# Patient Record
Sex: FEMALE | Race: WHITE | NOT HISPANIC OR LATINO | Employment: PART TIME | URBAN - METROPOLITAN AREA
[De-identification: names, ages, dates, MRNs, and addresses within clinical notes are randomized per-mention and may not be internally consistent; named-entity substitution may affect disease eponyms.]

---

## 2017-02-09 ENCOUNTER — HOSPITAL ENCOUNTER (OUTPATIENT)
Dept: LAB | Facility: MEDICAL CENTER | Age: 62
End: 2017-02-09
Attending: INTERNAL MEDICINE
Payer: COMMERCIAL

## 2017-02-09 LAB
ALBUMIN SERPL BCP-MCNC: 4.1 G/DL (ref 3.2–4.9)
ALBUMIN/GLOB SERPL: 1.4 G/DL
ALP SERPL-CCNC: 58 U/L (ref 30–99)
ALT SERPL-CCNC: 12 U/L (ref 2–50)
ANION GAP SERPL CALC-SCNC: 6 MMOL/L (ref 0–11.9)
APPEARANCE UR: CLEAR
AST SERPL-CCNC: 14 U/L (ref 12–45)
BASOPHILS # BLD AUTO: 0.03 K/UL (ref 0–0.12)
BASOPHILS NFR BLD AUTO: 0.7 % (ref 0–1.8)
BILIRUB SERPL-MCNC: 0.5 MG/DL (ref 0.1–1.5)
BILIRUB UR QL STRIP.AUTO: NEGATIVE
BUN SERPL-MCNC: 26 MG/DL (ref 8–22)
CALCIUM SERPL-MCNC: 8.9 MG/DL (ref 8.5–10.5)
CHLORIDE SERPL-SCNC: 103 MMOL/L (ref 96–112)
CHOLEST SERPL-MCNC: 188 MG/DL (ref 100–199)
CO2 SERPL-SCNC: 27 MMOL/L (ref 20–33)
COLOR UR AUTO: COLORLESS
CREAT SERPL-MCNC: 0.94 MG/DL (ref 0.5–1.4)
CRP SERPL HS-MCNC: 0.54 MG/DL (ref 0–0.75)
CULTURE IF INDICATED INDCX: NO UA CULTURE
EOSINOPHIL # BLD: 0.06 K/UL (ref 0–0.51)
EOSINOPHIL NFR BLD AUTO: 1.3 % (ref 0–6.9)
ERYTHROCYTE [DISTWIDTH] IN BLOOD BY AUTOMATED COUNT: 47.5 FL (ref 35.9–50)
GLOBULIN SER CALC-MCNC: 2.9 G/DL (ref 1.9–3.5)
GLUCOSE SERPL-MCNC: 58 MG/DL (ref 65–99)
GLUCOSE UR STRIP.AUTO-MCNC: NEGATIVE MG/DL
HCT VFR BLD AUTO: 42.7 % (ref 37–47)
HDLC SERPL-MCNC: 88 MG/DL
HGB BLD-MCNC: 13.6 G/DL (ref 12–16)
IMM GRANULOCYTES # BLD AUTO: 0.01 K/UL (ref 0–0.11)
IMM GRANULOCYTES NFR BLD AUTO: 0.2 % (ref 0–0.9)
KETONES UR STRIP.AUTO-MCNC: NEGATIVE MG/DL
LDLC SERPL CALC-MCNC: 91 MG/DL
LEUKOCYTE ESTERASE UR QL STRIP.AUTO: NEGATIVE
LYMPHOCYTES # BLD: 1.07 K/UL (ref 1–4.8)
LYMPHOCYTES NFR BLD AUTO: 23.5 % (ref 22–41)
MCH RBC QN AUTO: 31.4 PG (ref 27–33)
MCHC RBC AUTO-ENTMCNC: 31.9 G/DL (ref 33.6–35)
MCV RBC AUTO: 98.6 FL (ref 81.4–97.8)
MICRO URNS: NORMAL
MONOCYTES # BLD: 0.37 K/UL (ref 0–0.85)
MONOCYTES NFR BLD AUTO: 8.1 % (ref 0–13.4)
NEUTROPHILS # BLD: 3.02 K/UL (ref 2–7.15)
NEUTROPHILS NFR BLD AUTO: 66.2 % (ref 44–72)
NITRITE UR QL STRIP.AUTO: NEGATIVE
NRBC # BLD AUTO: 0 K/UL
NRBC BLD-RTO: 0 /100 WBC
PH UR: 6 [PH]
PLATELET # BLD AUTO: 213 K/UL (ref 164–446)
PMV BLD AUTO: 10.3 FL (ref 9–12.9)
POTASSIUM SERPL-SCNC: 4.1 MMOL/L (ref 3.6–5.5)
PROT SERPL-MCNC: 7 G/DL (ref 6–8.2)
PROT UR QL STRIP: NEGATIVE MG/DL
RBC # BLD AUTO: 4.33 M/UL (ref 4.2–5.4)
RBC UR QL AUTO: NEGATIVE
SODIUM SERPL-SCNC: 136 MMOL/L (ref 135–145)
SP GR UR STRIP.AUTO: 1.01
TRIGL SERPL-MCNC: 44 MG/DL (ref 0–149)
WBC # BLD AUTO: 4.6 K/UL (ref 4.8–10.8)

## 2017-02-09 PROCEDURE — 36415 COLL VENOUS BLD VENIPUNCTURE: CPT

## 2017-02-09 PROCEDURE — 85025 COMPLETE CBC W/AUTO DIFF WBC: CPT

## 2017-02-09 PROCEDURE — 80061 LIPID PANEL: CPT

## 2017-02-09 PROCEDURE — 80053 COMPREHEN METABOLIC PANEL: CPT

## 2017-02-09 PROCEDURE — 86140 C-REACTIVE PROTEIN: CPT

## 2017-02-09 PROCEDURE — 81003 URINALYSIS AUTO W/O SCOPE: CPT

## 2017-06-22 ENCOUNTER — APPOINTMENT (OUTPATIENT)
Dept: ADMISSIONS | Facility: MEDICAL CENTER | Age: 62
End: 2017-06-22
Payer: COMMERCIAL

## 2017-07-03 DIAGNOSIS — Z01.812 PRE-OPERATIVE LABORATORY EXAMINATION: ICD-10-CM

## 2017-07-03 DIAGNOSIS — Z01.810 PRE-OPERATIVE CARDIOVASCULAR EXAMINATION: ICD-10-CM

## 2017-07-03 LAB
ANION GAP SERPL CALC-SCNC: 6 MMOL/L (ref 0–11.9)
BUN SERPL-MCNC: 25 MG/DL (ref 8–22)
CALCIUM SERPL-MCNC: 9.3 MG/DL (ref 8.5–10.5)
CHLORIDE SERPL-SCNC: 105 MMOL/L (ref 96–112)
CO2 SERPL-SCNC: 28 MMOL/L (ref 20–33)
CREAT SERPL-MCNC: 0.91 MG/DL (ref 0.5–1.4)
EKG IMPRESSION: NORMAL
GFR SERPL CREATININE-BSD FRML MDRD: >60 ML/MIN/1.73 M 2
GLUCOSE SERPL-MCNC: 77 MG/DL (ref 65–99)
POTASSIUM SERPL-SCNC: 4.2 MMOL/L (ref 3.6–5.5)
SODIUM SERPL-SCNC: 139 MMOL/L (ref 135–145)

## 2017-07-03 PROCEDURE — 93010 ELECTROCARDIOGRAM REPORT: CPT | Performed by: INTERNAL MEDICINE

## 2017-07-03 PROCEDURE — 36415 COLL VENOUS BLD VENIPUNCTURE: CPT

## 2017-07-03 PROCEDURE — 80048 BASIC METABOLIC PNL TOTAL CA: CPT

## 2017-07-03 PROCEDURE — 93005 ELECTROCARDIOGRAM TRACING: CPT

## 2017-07-14 ENCOUNTER — HOSPITAL ENCOUNTER (OUTPATIENT)
Facility: MEDICAL CENTER | Age: 62
End: 2017-07-14
Attending: SURGERY | Admitting: SURGERY
Payer: COMMERCIAL

## 2017-07-14 VITALS
BODY MASS INDEX: 31 KG/M2 | HEART RATE: 75 BPM | DIASTOLIC BLOOD PRESSURE: 70 MMHG | TEMPERATURE: 97.7 F | SYSTOLIC BLOOD PRESSURE: 113 MMHG | WEIGHT: 168.43 LBS | HEIGHT: 62 IN | RESPIRATION RATE: 14 BRPM | OXYGEN SATURATION: 100 %

## 2017-07-14 PROBLEM — K64.8 INTERNAL HEMORRHOIDS WITHOUT MENTION OF COMPLICATION: Status: ACTIVE | Noted: 2017-07-14

## 2017-07-14 PROCEDURE — A9270 NON-COVERED ITEM OR SERVICE: HCPCS

## 2017-07-14 PROCEDURE — 501838 HCHG SUTURE GENERAL: Performed by: SURGERY

## 2017-07-14 PROCEDURE — 160035 HCHG PACU - 1ST 60 MINS PHASE I: Performed by: SURGERY

## 2017-07-14 PROCEDURE — 160036 HCHG PACU - EA ADDL 30 MINS PHASE I: Performed by: SURGERY

## 2017-07-14 PROCEDURE — 700101 HCHG RX REV CODE 250

## 2017-07-14 PROCEDURE — 160046 HCHG PACU - 1ST 60 MINS PHASE II: Performed by: SURGERY

## 2017-07-14 PROCEDURE — 160002 HCHG RECOVERY MINUTES (STAT): Performed by: SURGERY

## 2017-07-14 PROCEDURE — 160025 RECOVERY II MINUTES (STATS): Performed by: SURGERY

## 2017-07-14 PROCEDURE — 700111 HCHG RX REV CODE 636 W/ 250 OVERRIDE (IP)

## 2017-07-14 PROCEDURE — 160047 HCHG PACU  - EA ADDL 30 MINS PHASE II: Performed by: SURGERY

## 2017-07-14 PROCEDURE — 160048 HCHG OR STATISTICAL LEVEL 1-5: Performed by: SURGERY

## 2017-07-14 PROCEDURE — 160009 HCHG ANES TIME/MIN: Performed by: SURGERY

## 2017-07-14 PROCEDURE — 500445 HCHG HEMOSTAT, SURGICEL 4X8: Performed by: SURGERY

## 2017-07-14 PROCEDURE — 160039 HCHG SURGERY MINUTES - EA ADDL 1 MIN LEVEL 3: Performed by: SURGERY

## 2017-07-14 PROCEDURE — 502240 HCHG MISC OR SUPPLY RC 0272: Performed by: SURGERY

## 2017-07-14 PROCEDURE — 700102 HCHG RX REV CODE 250 W/ 637 OVERRIDE(OP)

## 2017-07-14 PROCEDURE — 160028 HCHG SURGERY MINUTES - 1ST 30 MINS LEVEL 3: Performed by: SURGERY

## 2017-07-14 RX ORDER — POLYETHYLENE GLYCOL 3350 17 G/17G
17 POWDER, FOR SOLUTION ORAL PRN
Qty: 1 BOTTLE | Status: SHIPPED | OUTPATIENT
Start: 2017-07-14 | End: 2018-07-26

## 2017-07-14 RX ORDER — CELECOXIB 200 MG/1
CAPSULE ORAL
Status: COMPLETED
Start: 2017-07-14 | End: 2017-07-14

## 2017-07-14 RX ORDER — SODIUM CHLORIDE, SODIUM LACTATE, POTASSIUM CHLORIDE, CALCIUM CHLORIDE 600; 310; 30; 20 MG/100ML; MG/100ML; MG/100ML; MG/100ML
INJECTION, SOLUTION INTRAVENOUS CONTINUOUS
Status: DISCONTINUED | OUTPATIENT
Start: 2017-07-14 | End: 2017-07-14 | Stop reason: HOSPADM

## 2017-07-14 RX ORDER — ONDANSETRON 4 MG/1
4 TABLET, FILM COATED ORAL EVERY 4 HOURS PRN
Qty: 20 TAB | Refills: 1 | Status: SHIPPED | OUTPATIENT
Start: 2017-07-14 | End: 2018-07-26

## 2017-07-14 RX ORDER — ACETAMINOPHEN 500 MG
TABLET ORAL
Status: COMPLETED
Start: 2017-07-14 | End: 2017-07-14

## 2017-07-14 RX ORDER — OXYCODONE HYDROCHLORIDE 5 MG/1
TABLET ORAL
Status: COMPLETED
Start: 2017-07-14 | End: 2017-07-14

## 2017-07-14 RX ORDER — OXYCODONE HYDROCHLORIDE 5 MG/1
5 TABLET ORAL ONCE
Status: DISCONTINUED | OUTPATIENT
Start: 2017-07-14 | End: 2017-07-14 | Stop reason: HOSPADM

## 2017-07-14 RX ORDER — LIDOCAINE HYDROCHLORIDE 10 MG/ML
INJECTION, SOLUTION INFILTRATION; PERINEURAL
Status: COMPLETED
Start: 2017-07-14 | End: 2017-07-14

## 2017-07-14 RX ORDER — LIDOCAINE AND PRILOCAINE 25; 25 MG/G; MG/G
1 CREAM TOPICAL
Status: DISCONTINUED | OUTPATIENT
Start: 2017-07-14 | End: 2017-07-14 | Stop reason: HOSPADM

## 2017-07-14 RX ORDER — LIDOCAINE HYDROCHLORIDE 10 MG/ML
0.5 INJECTION, SOLUTION INFILTRATION; PERINEURAL
Status: DISCONTINUED | OUTPATIENT
Start: 2017-07-14 | End: 2017-07-14 | Stop reason: HOSPADM

## 2017-07-14 RX ORDER — BUPIVACAINE HYDROCHLORIDE AND EPINEPHRINE 5; 5 MG/ML; UG/ML
INJECTION, SOLUTION EPIDURAL; INTRACAUDAL; PERINEURAL
Status: DISCONTINUED | OUTPATIENT
Start: 2017-07-14 | End: 2017-07-14 | Stop reason: HOSPADM

## 2017-07-14 RX ORDER — OXYCODONE HYDROCHLORIDE AND ACETAMINOPHEN 5; 325 MG/1; MG/1
1-2 TABLET ORAL EVERY 4 HOURS PRN
Qty: 60 TAB | Refills: 0 | Status: SHIPPED | OUTPATIENT
Start: 2017-07-14 | End: 2018-07-26

## 2017-07-14 RX ADMIN — OXYCODONE HYDROCHLORIDE 5 MG: 5 TABLET ORAL at 09:30

## 2017-07-14 RX ADMIN — LIDOCAINE HYDROCHLORIDE: 10 INJECTION, SOLUTION INFILTRATION; PERINEURAL at 06:30

## 2017-07-14 RX ADMIN — SODIUM CHLORIDE, SODIUM LACTATE, POTASSIUM CHLORIDE, CALCIUM CHLORIDE: 600; 310; 30; 20 INJECTION, SOLUTION INTRAVENOUS at 07:15

## 2017-07-14 RX ADMIN — ACETAMINOPHEN 1000 MG: 500 TABLET, FILM COATED ORAL at 07:37

## 2017-07-14 ASSESSMENT — PAIN SCALES - GENERAL
PAINLEVEL_OUTOF10: 4
PAINLEVEL_OUTOF10: 0
PAINLEVEL_OUTOF10: 0
PAINLEVEL_OUTOF10: 4
PAINLEVEL_OUTOF10: 0
PAINLEVEL_OUTOF10: 4
PAINLEVEL_OUTOF10: 4
PAINLEVEL_OUTOF10: 0
PAINLEVEL_OUTOF10: 0

## 2017-07-14 NOTE — IP AVS SNAPSHOT
" Home Care Instructions                                                                                                                Name:Zakiya Kelly  Medical Record Number:[id number]  CSN: [id number]    YOB: 1955   Age: 61 y.o.  Sex: female  HT:1.575 m (5' 2\") WT: 76.4 kg (168 lb 6.9 oz)          Admit Date: 7/14/2017     Discharge Date:   Today's Date: 7/14/2017  Attending Doctor:  Eleazar Mckeon M.D.                  Allergies:  Aspirin; Codeine; Sulfa drugs; Valium; Versed; and Vicodin              Follow-up Information     1. Follow up with Eleazar Mckeon M.D. In 1 week.    Specialties:  Surgery, Radiology    Contact information    [id number]  [address]  [place] [address] [phone]          Discharge Instructions         ACTIVITY: Rest and take it easy for the first 24 hours.  A responsible adult is recommended to remain with you during that time.  It is normal to feel sleepy.  We encourage you to not do anything that requires balance, judgment or coordination.    MILD FLU-LIKE SYMPTOMS ARE NORMAL. YOU MAY EXPERIENCE GENERALIZED MUSCLE ACHES, THROAT IRRITATION, HEADACHE AND/OR SOME NAUSEA.    FOR 24 HOURS DO NOT:  Drive, operate machinery or run household appliances.  Drink beer or alcoholic beverages.   Make important decisions or sign legal documents.    SPECIAL INSTRUCTIONS: FOLLOW INSTRUCTION GIVEN TO YOU BY YOUR PHYSICIAN. SCHEDULE A FOLLOW UP APPOINTMENT IF NOT ALREADY DONE. ADVANCE DIET SLOWLY AS TOLERATED     DIET: To avoid nausea, slowly advance diet as tolerated, avoiding spicy or greasy foods for the first day.  Add more substantial food to your diet according to your physician's instructions.  Babies can be fed formula or breast milk as soon as they are hungry.  INCREASE FLUIDS AND FIBER TO AVOID CONSTIPATION.    SURGICAL DRESSING/BATHING: YOU MAY SHOWER TOMORROW WHEN YOU ARE FEELING MORE STABLE.     FOLLOW-UP APPOINTMENT:  A follow-up appointment should be arranged " with your doctor; call to schedule.    You should CALL YOUR PHYSICIAN if you develop:  Fever greater than 101 degrees F.  Pain not relieved by medication, or persistent nausea or vomiting.  Excessive bleeding (blood soaking through dressing) or unexpected drainage from the wound.  Extreme redness or swelling around the incision site, drainage of pus or foul smelling drainage.  Inability to urinate or empty your bladder within 8 hours.  Problems with breathing or chest pain.    You should call 911 if you develop problems with breathing or chest pain.  If you are unable to contact your doctor or surgical center, you should go to the nearest emergency room or urgent care center.  Physician's telephone #: [phone]    If any questions arise, call your doctor.  If your doctor is not available, please feel free to call the Surgical Center at [phone].  The Center is open Monday through Friday from 7AM to 7PM.  You can also call the [employer] HOTLINE open 24 hours/day, 7 days/week and speak to a nurse at [phone], or toll free at [phone].    A registered nurse may call you a few days after your surgery to see how you are doing after your procedure.    MEDICATIONS: Resume taking daily medication.  Take prescribed pain medication with food.  If no medication is prescribed, you may take non-aspirin pain medication if needed.  PAIN MEDICATION CAN BE VERY CONSTIPATING.  Take a stool softener or laxative such as senokot, pericolace, or milk of magnesia if needed.    Prescription given for Zofran, percocet, miralax.  Last pain medication given at 9:30am.    If your physician has prescribed pain medication that includes Acetaminophen (Tylenol), do not take additional Acetaminophen (Tylenol) while taking the prescribed medication.    Depression / Suicide Risk    As you are discharged from this [facility] facility, it is important to learn how to keep safe from harming yourself.    Recognize the warning  signs:  · Abrupt changes in personality, positive or negative- including increase in energy   · Giving away possessions  · Change in eating patterns- significant weight changes-  positive or negative  · Change in sleeping patterns- unable to sleep or sleeping all the time   · Unwillingness or inability to communicate  · Depression  · Unusual sadness, discouragement and loneliness  · Talk of wanting to die  · Neglect of personal appearance   · Rebelliousness- reckless behavior  · Withdrawal from people/activities they love  · Confusion- inability to concentrate     If you or a loved one observes any of these behaviors or has concerns about self-harm, here's what you can do:  · Talk about it- your feelings and reasons for harming yourself  · Remove any means that you might use to hurt yourself (examples: pills, rope, extension cords, firearm)  · Get professional help from the community (Mental Health, Substance Abuse, psychological counseling)  · Do not be alone:Call your Safe Contact- someone whom you trust who will be there for you.  · Call your local CRISIS HOTLINE 395-7626 or 090-459-9786  · Call your local Children's Mobile Crisis Response Team [place] (001) 921-7956 or www.[employer]  · Call the toll free National Suicide Prevention Hotlines   · National Suicide Prevention Lifeline 222-584-DNUH (8906)  · National Hope Line Network 800-SUICIDE (506-4292)       Medication List      START taking these medications        Instructions    Morning Afternoon Evening Bedtime    ondansetron 4 MG Tabs tablet   Commonly known as:  ZOFRAN        Take 1 Tab by mouth every four hours as needed for Nausea/Vomiting (nausea).   Dose:  4 mg                        oxycodone-acetaminophen 5-325 MG Tabs   Commonly known as:  PERCOCET        Take 1-2 Tabs by mouth every four hours as needed (pain).   Dose:  1-2 Tab                        polyethylene glycol 3350 Powd   Commonly known as:  MIRALAX        Take 17 g by mouth  as needed (constipation).   Dose:  17 g                          CONTINUE taking these medications        Instructions    Morning Afternoon Evening Bedtime    VLADISLAV-C PO        Take 1,000 mg by mouth every day.   Dose:  1000 mg                        lisinopril 10 MG Tabs   Commonly known as:  PRINIVIL        Take 10 mg by mouth every day.   Dose:  10 mg                        PLAQUENIL 200 MG Tabs   Generic drug:  hydroxychloroquine        Take 400 mg by mouth every day.   Dose:  400 mg                        TYLENOL EXTRA STRENGTH PO   Last time this was given:  1,000 mg on 7/14/2017  7:37 AM        Take  by mouth as needed.                        VITAMIN B-12 PO        Take  by mouth every day.                        Vitamin D 2000 UNITS Caps        Take  by mouth every day.                        ZYRTEC 10 MG Tabs   Generic drug:  cetirizine        Take 10 mg by mouth every day.   Dose:  10 mg                             Where to Get Your Medications      You can get these medications from any pharmacy     Bring a paper prescription for each of these medications    - ondansetron 4 MG Tabs tablet  - oxycodone-acetaminophen 5-325 MG Tabs  - polyethylene glycol 3350 Powd            Medication Information     Above and/or attached are the medications your physician expects you to take upon discharge. Review all of your home medications and newly ordered medications with your doctor and/or pharmacist. Follow medication instructions as directed by your doctor and/or pharmacist. Please keep your medication list with you and share with your physician. Update the information when medications are discontinued, doses are changed, or new medications (including over-the-counter products) are added; and carry medication information at all times in the event of emergency situations.        Resources     Quit Smoking / Tobacco Use:    I understand the use of any tobacco products increases my chance of suffering from future  heart disease or stroke and could cause other illnesses which may shorten my life. Quitting the use of tobacco products is the single most important thing I can do to improve my health. For further information on smoking / tobacco cessation call a Toll Free Quit Line at 1-684.522.6684 (*[employer]) or 1-719.236.1950 (American Lung Association) or you can access the web based program at www.lungusa.org.    [place] Tobacco Users Help Line:  (818) 268-6953       Toll Free: 1-912.674.4669  Quit Tobacco Program [facility] (239)700-9361    Crisis Hotline:    [place] Crisis Hotline:  5-397-DQBZGBB or 1-885.879.3455    [place] Crisis Hotline:    1-228.632.4288 or 391-801-0347    Discharge Survey:   Thank you for choosing [facility]. We hope we did everything we could to make your hospital stay a pleasant one. You may be receiving a survey and we would appreciate your time and participation in answering the questions. Your input is very valuable to us in our efforts to improve our service to our patients and their families.            Signatures     My signature on this form indicates that:    1. I acknowledge receipt and understanding of these Home Care Instruction.  2. My questions regarding this information have been answered to my satisfaction.  3. I have formulated a plan with my discharge nurse to obtain my prescribed medications for home.    __________________________________      __________________________________                   Patient Signature                                 Guardian/Responsible Adult Signature      __________________________________                 __________       ________                       Nurse Signature                                               Date                 Time

## 2017-07-14 NOTE — IP AVS SNAPSHOT
7/14/2017    Zakiya Kelly  [address] [address]    Dear Zakiya:    [facility] wants to ensure your discharge home is safe and you or your loved ones have had all of your questions answered regarding your care after you leave the hospital.    Below is a list of resources and contact information should you have any questions regarding your hospital stay, follow-up instructions, or active medical symptoms.    Questions or Concerns Regarding… Contact   Medical Questions Related to Your Discharge  (7 days a week, 8am-5pm) Contact a Nurse Care Coordinator   [phone]   Medical Questions Not Related to Your Discharge  (24 hours a day / 7 days a week)  Contact the Nurse Health Line   [phone]    Medications or Discharge Instructions Refer to your discharge packet   or contact your [facility] Primary Care Provider   [phone]   Follow-up Appointment(s) Schedule your appointment via [employer]   or contact Scheduling [phone]   Billing Review your statement via [employer]  or contact Billing [phone]   Medical Records Review your records via LATTO   or contact Medical Records [phone]     You may receive a telephone call within two days of discharge. This call is to make certain you understand your discharge instructions and have the opportunity to have any questions answered. You can also easily access your medical information, test results and upcoming appointments via the [employer] free online health management tool. You can learn more and sign up at [employer]LATTO. For assistance setting up your LATTO account, please call [phone].    Once again, we want to ensure your discharge home is safe and that you have a clear understanding of any next steps in your care. If you have any questions or concerns, please do not hesitate to contact us, we are here for you. Thank you for choosing [facility] for your healthcare needs.    Sincerely,    Your [facility] Healthcare Team

## 2017-07-14 NOTE — OP REPORT
Preoperative diagnosis: Symptomatic hemorrhoids    Postoperative diagnosis: Same    Procedure: Hemorrhoid artery ligation and rectoanal repair    Surgeon: Dr. Eleazar Mckeon    Assistant: None    Anesthesia: LMA by Dr. Lyons    Specimens: None    Estimated blood loss: 10 mL    Complications: None     Condition: Stable    Indications for procedure: This is a 61-year-old female who presents with symptomatic hemorrhoids which are refractory to medical management. Patient now presents for elective surgery, and has elected to have a hemorrhoid ligation procedure. Risk benefits alternatives of surgery made her before proceeding    Operative findings: Enlarged hemorrhoids in 3 columns with 6 ligations and 3 pexy's performed with hemostasis.    Operative techniques: After informed consent was obtained the patient was taken to the operating room placed in the supine position, after adequate endotracheal anesthesia was achieved she was in lithotomy position the anus and perineum were prepped and draped in sterile fashion. We then performed a digital rectal Hill-Fajardo retractor examined with the findings as above. We then inserted the AMI device and identify the hemorrhoidal vessels in 6 locations. These were ligated with 0 Vicryl U stitches with hemostasis.    We then performed 3 hemorrhoidal pexy's on the major hemorrhoid columns with running 0 Vicryl sutures starting at the apex of the column and running down to the dentate line. Hemostasis was noted the operative field 30 mL of half percent Marcaine was given for local anesthetic block. The procedure was concluded, Surgicel was placed in the anal canal and the patient was returned to the PACU in stable condition. All counts were correct at the end of the procedure.

## 2017-07-14 NOTE — DISCHARGE INSTRUCTIONS
ACTIVITY: Rest and take it easy for the first 24 hours.  A responsible adult is recommended to remain with you during that time.  It is normal to feel sleepy.  We encourage you to not do anything that requires balance, judgment or coordination.    MILD FLU-LIKE SYMPTOMS ARE NORMAL. YOU MAY EXPERIENCE GENERALIZED MUSCLE ACHES, THROAT IRRITATION, HEADACHE AND/OR SOME NAUSEA.    FOR 24 HOURS DO NOT:  Drive, operate machinery or run household appliances.  Drink beer or alcoholic beverages.   Make important decisions or sign legal documents.    SPECIAL INSTRUCTIONS: FOLLOW INSTRUCTION GIVEN TO YOU BY YOUR PHYSICIAN. SCHEDULE A FOLLOW UP APPOINTMENT IF NOT ALREADY DONE. ADVANCE DIET SLOWLY AS TOLERATED     DIET: To avoid nausea, slowly advance diet as tolerated, avoiding spicy or greasy foods for the first day.  Add more substantial food to your diet according to your physician's instructions.  Babies can be fed formula or breast milk as soon as they are hungry.  INCREASE FLUIDS AND FIBER TO AVOID CONSTIPATION.    SURGICAL DRESSING/BATHING: YOU MAY SHOWER TOMORROW WHEN YOU ARE FEELING MORE STABLE.     FOLLOW-UP APPOINTMENT:  A follow-up appointment should be arranged with your doctor; call to schedule.    You should CALL YOUR PHYSICIAN if you develop:  Fever greater than 101 degrees F.  Pain not relieved by medication, or persistent nausea or vomiting.  Excessive bleeding (blood soaking through dressing) or unexpected drainage from the wound.  Extreme redness or swelling around the incision site, drainage of pus or foul smelling drainage.  Inability to urinate or empty your bladder within 8 hours.  Problems with breathing or chest pain.    You should call 911 if you develop problems with breathing or chest pain.  If you are unable to contact your doctor or surgical center, you should go to the nearest emergency room or urgent care center.  Physician's telephone #: [phone]    If any questions arise, call your  doctor.  If your doctor is not available, please feel free to call the Surgical Center at [phone].  The Center is open Monday through Friday from 7AM to 7PM.  You can also call the HEALTH HOTLINE open 24 hours/day, 7 days/week and speak to a nurse at [phone], or toll free at [phone].    A registered nurse may call you a few days after your surgery to see how you are doing after your procedure.    MEDICATIONS: Resume taking daily medication.  Take prescribed pain medication with food.  If no medication is prescribed, you may take non-aspirin pain medication if needed.  PAIN MEDICATION CAN BE VERY CONSTIPATING.  Take a stool softener or laxative such as senokot, pericolace, or milk of magnesia if needed.    Prescription given for Zofran, percocet, miralax.  Last pain medication given at 9:30am.    If your physician has prescribed pain medication that includes Acetaminophen (Tylenol), do not take additional Acetaminophen (Tylenol) while taking the prescribed medication.    Depression / Suicide Risk    As you are discharged from this [facility] facility, it is important to learn how to keep safe from harming yourself.    Recognize the warning signs:  · Abrupt changes in personality, positive or negative- including increase in energy   · Giving away possessions  · Change in eating patterns- significant weight changes-  positive or negative  · Change in sleeping patterns- unable to sleep or sleeping all the time   · Unwillingness or inability to communicate  · Depression  · Unusual sadness, discouragement and loneliness  · Talk of wanting to die  · Neglect of personal appearance   · Rebelliousness- reckless behavior  · Withdrawal from people/activities they love  · Confusion- inability to concentrate     If you or a loved one observes any of these behaviors or has concerns about self-harm, here's what you can do:  · Talk about it- your feelings and reasons for harming yourself  · Remove any means  that you might use to hurt yourself (examples: pills, rope, extension cords, firearm)  · Get professional help from the community (Mental Health, Substance Abuse, psychological counseling)  · Do not be alone:Call your Safe Contact- someone whom you trust who will be there for you.  · Call your local CRISIS HOTLINE 723-8250 or 279-442-5842  · Call your local Children's Mobile Crisis Response Team [place] (772) 177-7794 or www.[employer]  · Call the toll free National Suicide Prevention Hotlines   · National Suicide Prevention Lifeline 186-533-AHGW (2777)  · National Hope Line Network 800-SUICIDE (710-5844)

## 2017-07-17 NOTE — PROGRESS NOTES
Med rec complete per Pt at bedside  Allergies reviewed  No ABX in last 30 days  
No masses; no nipple discharge

## 2017-08-08 ENCOUNTER — HOSPITAL ENCOUNTER (OUTPATIENT)
Dept: LAB | Facility: MEDICAL CENTER | Age: 62
End: 2017-08-08
Attending: INTERNAL MEDICINE
Payer: COMMERCIAL

## 2017-08-08 LAB
ALBUMIN SERPL BCP-MCNC: 4.4 G/DL (ref 3.2–4.9)
ALBUMIN/GLOB SERPL: 1.7 G/DL
ALP SERPL-CCNC: 63 U/L (ref 30–99)
ALT SERPL-CCNC: 12 U/L (ref 2–50)
ANION GAP SERPL CALC-SCNC: 8 MMOL/L (ref 0–11.9)
APPEARANCE UR: CLEAR
AST SERPL-CCNC: 15 U/L (ref 12–45)
BACTERIA #/AREA URNS HPF: NEGATIVE /HPF
BASOPHILS # BLD AUTO: 0.4 % (ref 0–1.8)
BASOPHILS # BLD: 0.02 K/UL (ref 0–0.12)
BILIRUB SERPL-MCNC: 0.6 MG/DL (ref 0.1–1.5)
BILIRUB UR QL STRIP.AUTO: NEGATIVE
BUN SERPL-MCNC: 19 MG/DL (ref 8–22)
C3 SERPL-MCNC: 132 MG/DL (ref 87–200)
C4 SERPL-MCNC: 44 MG/DL (ref 19–52)
CALCIUM SERPL-MCNC: 9.8 MG/DL (ref 8.5–10.5)
CHLORIDE SERPL-SCNC: 103 MMOL/L (ref 96–112)
CO2 SERPL-SCNC: 27 MMOL/L (ref 20–33)
COLOR UR: YELLOW
CREAT SERPL-MCNC: 0.9 MG/DL (ref 0.5–1.4)
CRP SERPL HS-MCNC: 0.7 MG/DL (ref 0–0.75)
CULTURE IF INDICATED INDCX: YES UA CULTURE
EOSINOPHIL # BLD AUTO: 0.09 K/UL (ref 0–0.51)
EOSINOPHIL NFR BLD: 1.9 % (ref 0–6.9)
EPI CELLS #/AREA URNS HPF: ABNORMAL /HPF
ERYTHROCYTE [DISTWIDTH] IN BLOOD BY AUTOMATED COUNT: 43.5 FL (ref 35.9–50)
GFR SERPL CREATININE-BSD FRML MDRD: >60 ML/MIN/1.73 M 2
GLOBULIN SER CALC-MCNC: 2.6 G/DL (ref 1.9–3.5)
GLUCOSE SERPL-MCNC: 56 MG/DL (ref 65–99)
GLUCOSE UR STRIP.AUTO-MCNC: NEGATIVE MG/DL
HCT VFR BLD AUTO: 43.4 % (ref 37–47)
HGB BLD-MCNC: 14.5 G/DL (ref 12–16)
HYALINE CASTS #/AREA URNS LPF: ABNORMAL /LPF
IMM GRANULOCYTES # BLD AUTO: 0.01 K/UL (ref 0–0.11)
IMM GRANULOCYTES NFR BLD AUTO: 0.2 % (ref 0–0.9)
KETONES UR STRIP.AUTO-MCNC: NEGATIVE MG/DL
LEUKOCYTE ESTERASE UR QL STRIP.AUTO: ABNORMAL
LYMPHOCYTES # BLD AUTO: 0.98 K/UL (ref 1–4.8)
LYMPHOCYTES NFR BLD: 20.9 % (ref 22–41)
MCH RBC QN AUTO: 31.9 PG (ref 27–33)
MCHC RBC AUTO-ENTMCNC: 33.4 G/DL (ref 33.6–35)
MCV RBC AUTO: 95.4 FL (ref 81.4–97.8)
MICRO URNS: ABNORMAL
MONOCYTES # BLD AUTO: 0.27 K/UL (ref 0–0.85)
MONOCYTES NFR BLD AUTO: 5.8 % (ref 0–13.4)
NEUTROPHILS # BLD AUTO: 3.31 K/UL (ref 2–7.15)
NEUTROPHILS NFR BLD: 70.8 % (ref 44–72)
NITRITE UR QL STRIP.AUTO: NEGATIVE
NRBC # BLD AUTO: 0 K/UL
NRBC BLD AUTO-RTO: 0 /100 WBC
PH UR STRIP.AUTO: 6.5 [PH]
PLATELET # BLD AUTO: 252 K/UL (ref 164–446)
PMV BLD AUTO: 10.3 FL (ref 9–12.9)
POTASSIUM SERPL-SCNC: 3.9 MMOL/L (ref 3.6–5.5)
PROT SERPL-MCNC: 7 G/DL (ref 6–8.2)
PROT UR QL STRIP: NEGATIVE MG/DL
RBC # BLD AUTO: 4.55 M/UL (ref 4.2–5.4)
RBC UR QL AUTO: NEGATIVE
SODIUM SERPL-SCNC: 138 MMOL/L (ref 135–145)
SP GR UR STRIP.AUTO: 1.01
UROBILINOGEN UR STRIP.AUTO-MCNC: 0.2 MG/DL
WBC # BLD AUTO: 4.7 K/UL (ref 4.8–10.8)
WBC #/AREA URNS HPF: ABNORMAL /HPF

## 2017-08-08 PROCEDURE — 86140 C-REACTIVE PROTEIN: CPT

## 2017-08-08 PROCEDURE — 81001 URINALYSIS AUTO W/SCOPE: CPT

## 2017-08-08 PROCEDURE — 85025 COMPLETE CBC W/AUTO DIFF WBC: CPT

## 2017-08-08 PROCEDURE — 36415 COLL VENOUS BLD VENIPUNCTURE: CPT

## 2017-08-08 PROCEDURE — 86160 COMPLEMENT ANTIGEN: CPT | Mod: 91

## 2017-08-08 PROCEDURE — 80053 COMPREHEN METABOLIC PANEL: CPT

## 2017-08-08 PROCEDURE — 87086 URINE CULTURE/COLONY COUNT: CPT

## 2017-08-10 LAB
BACTERIA UR CULT: NORMAL
SIGNIFICANT IND 70042: NORMAL
SOURCE SOURCE: NORMAL

## 2017-08-16 ENCOUNTER — RX ONLY (OUTPATIENT)
Age: 62
Setting detail: RX ONLY
End: 2017-08-16

## 2017-11-03 ENCOUNTER — HOSPITAL ENCOUNTER (OUTPATIENT)
Dept: RADIOLOGY | Facility: MEDICAL CENTER | Age: 62
End: 2017-11-03
Attending: FAMILY MEDICINE
Payer: COMMERCIAL

## 2017-11-03 DIAGNOSIS — Z12.31 SCREENING MAMMOGRAM, ENCOUNTER FOR: ICD-10-CM

## 2017-11-03 PROCEDURE — G0202 SCR MAMMO BI INCL CAD: HCPCS

## 2017-11-09 ENCOUNTER — HOSPITAL ENCOUNTER (OUTPATIENT)
Dept: RADIOLOGY | Facility: MEDICAL CENTER | Age: 62
End: 2017-11-09
Attending: FAMILY MEDICINE
Payer: COMMERCIAL

## 2017-11-09 DIAGNOSIS — R10.2 ADNEXAL TENDERNESS, RIGHT: ICD-10-CM

## 2017-11-09 PROCEDURE — 76830 TRANSVAGINAL US NON-OB: CPT

## 2017-11-20 ENCOUNTER — OFFICE VISIT (OUTPATIENT)
Dept: URGENT CARE | Facility: PHYSICIAN GROUP | Age: 62
End: 2017-11-20
Payer: COMMERCIAL

## 2017-11-20 VITALS
RESPIRATION RATE: 16 BRPM | WEIGHT: 166 LBS | HEART RATE: 80 BPM | HEIGHT: 63 IN | OXYGEN SATURATION: 100 % | BODY MASS INDEX: 29.41 KG/M2 | SYSTOLIC BLOOD PRESSURE: 122 MMHG | DIASTOLIC BLOOD PRESSURE: 78 MMHG | TEMPERATURE: 98.7 F

## 2017-11-20 DIAGNOSIS — R42 VERTIGO: ICD-10-CM

## 2017-11-20 PROCEDURE — 99203 OFFICE O/P NEW LOW 30 MIN: CPT | Performed by: EMERGENCY MEDICINE

## 2017-11-20 RX ORDER — MECLIZINE HYDROCHLORIDE 25 MG/1
25 TABLET ORAL 3 TIMES DAILY PRN
Qty: 30 TAB | Refills: 0 | Status: SHIPPED | OUTPATIENT
Start: 2017-11-20 | End: 2018-07-26

## 2017-11-20 NOTE — LETTER
November 20, 2017        Zakiya [place]  [address] [address]        Dear Zakiya:    Please ask for the next 1-2 days off for medical reasons.    If you have any questions or concerns, please don't hesitate to call.        Sincerely,        Braxton Harrison M.D.    Electronically Signed

## 2017-11-21 ENCOUNTER — TELEPHONE (OUTPATIENT)
Dept: URGENT CARE | Facility: PHYSICIAN GROUP | Age: 62
End: 2017-11-21

## 2017-11-21 ASSESSMENT — ENCOUNTER SYMPTOMS
SHORTNESS OF BREATH: 0
EYE DISCHARGE: 0
HEADACHES: 0
DIZZINESS: 1
COUGH: 0
EYE REDNESS: 0
FEVER: 0
NERVOUS/ANXIOUS: 0
NAUSEA: 0
CHILLS: 0
BACK PAIN: 0
VOMITING: 0
NECK PAIN: 0

## 2017-11-21 NOTE — TELEPHONE ENCOUNTER
I'm going to putting the nose and throat consult for the patient based on her vertiginous symptoms.

## 2017-11-21 NOTE — PROGRESS NOTES
"Subjective:      Zakiya Kelly is a 62 y.o. female who presents with Otalgia (L ear pain x2 days, has vertigo )            HPI  Patient is a 62-year-old female complaining with associated vertigo for the past 2 days. Patient's vertigo is positional in nature, she denies any fever, chills, nausea or vomiting. She has read up on the subject and is aware of the possibilities of benign positional vertigo as well as existence of otolith.    Allergies   Allergen Reactions   • Aspirin      Stomach ulcers   • Codeine Nausea     Does ok with zofran with it   • Sulfa Drugs Hives   • Valium Nausea   • Versed Vomiting   • Vicodin [Hydrocodone-Acetaminophen]      \"makes me crazy\"     Social History     Social History   • Marital status:      Spouse name: N/A   • Number of children: N/A   • Years of education: N/A     Occupational History   • Not on file.     Social History Main Topics   • Smoking status: Never Smoker   • Smokeless tobacco: Never Used   • Alcohol use Yes      Comment: 6 per week   • Drug use: No   • Sexual activity: Yes     Partners: Male     Other Topics Concern   • Not on file     Social History Narrative   • No narrative on file     Past Medical History:   Diagnosis Date   • Anesthesia     postop n/v   • Bowel habit changes     IBS   • CATARACT     bilateral IOL   • HTN (hypertension)    • Mixed connective tissue disease (CMS-HCC)    • Seasonal allergies    • Snoring      Current Outpatient Prescriptions on File Prior to Visit   Medication Sig Dispense Refill   • oxycodone-acetaminophen (PERCOCET) 5-325 MG Tab Take 1-2 Tabs by mouth every four hours as needed (pain). 60 Tab 0   • polyethylene glycol 3350 (MIRALAX) Powder Take 17 g by mouth as needed (constipation). 1 Bottle prn   • ondansetron (ZOFRAN) 4 MG Tab tablet Take 1 Tab by mouth every four hours as needed for Nausea/Vomiting (nausea). 20 Tab 1   • Cyanocobalamin (VITAMIN B-12 PO) Take  by mouth every day.     • cetirizine (ZYRTEC) 10 MG " "TABS Take 10 mg by mouth every day.     • hydroxychloroquine (PLAQUENIL) 200 MG TABS Take 400 mg by mouth every day.     • Cholecalciferol (VITAMIN D) 2000 UNIT CAPS Take  by mouth every day.     • Vitamin Mixture (VLADISLAV-C PO) Take 1,000 mg by mouth every day.     • Acetaminophen (TYLENOL EXTRA STRENGTH PO) Take  by mouth as needed.     • lisinopril (PRINIVIL) 10 MG TABS Take 10 mg by mouth every day.       No current facility-administered medications on file prior to visit.    History reviewed. No pertinent family history.  Review of Systems   Constitutional: Negative for chills and fever.   HENT: Positive for ear pain (left-sided earache.). Negative for ear discharge.    Eyes: Negative for discharge and redness.   Respiratory: Negative for cough and shortness of breath.    Gastrointestinal: Negative for nausea and vomiting.   Musculoskeletal: Negative for back pain and neck pain.   Skin: Negative for rash.   Neurological: Positive for dizziness. Negative for headaches.   Psychiatric/Behavioral: The patient is not nervous/anxious.           Objective:     /78   Pulse 80   Temp 37.1 °C (98.7 °F)   Resp 16   Ht 1.588 m (5' 2.5\")   Wt 75.3 kg (166 lb)   SpO2 100%   Breastfeeding? No   BMI 29.88 kg/m²      Physical Exam   Constitutional: She appears well-developed and well-nourished. She appears distressed.   HENT:   Head: Normocephalic.   Right Ear: External ear normal.   Left Ear: External ear normal.   Mouth/Throat: Oropharynx is clear and moist.   And TMs appear normal no erythema there could be some fluid behind her ear but there is no definite meniscus.   Eyes: Conjunctivae are normal. Right eye exhibits no discharge. Left eye exhibits no discharge.   Cardiovascular: Normal rate.    Pulmonary/Chest: Effort normal and breath sounds normal. No respiratory distress.   Musculoskeletal: Normal range of motion.   Neurological: She is alert. She displays normal reflexes. Coordination normal.   Skin: Skin " is warm and dry. She is not diaphoretic.   Psychiatric: She has a normal mood and affect. Her behavior is normal.   Vitals reviewed.              Assessment/Plan:     1. Vertigo    I am recommending the patient initiate/ continue hydration efforts including the use of a vaporizer/humidifier/ netti pot. I also recommend the pt, initiate Mucinex D. Watch her blood pressure. Change positions slowly from lying to sitting from sitting to standing, and from standing to walking. In addition the patient will initiate the prescribed prescription medication/s: Antivert 25 mg 3 times a day. Avoid other antihistamines.. If the patient's condition exacerbates with worsening dysphagia, shortness of breath, uncontrolled fever, headache or chest pressure he/she will return immediately to the urgent care or go to  the emergency department for further evaluation.    Braxton Harrison    - meclizine (ANTIVERT) 25 MG Tab; Take 1 Tab by mouth 3 times a day as needed.  Dispense: 30 Tab; Refill: 0

## 2018-02-12 ENCOUNTER — HOSPITAL ENCOUNTER (OUTPATIENT)
Dept: LAB | Facility: MEDICAL CENTER | Age: 63
End: 2018-02-12
Attending: INTERNAL MEDICINE
Payer: COMMERCIAL

## 2018-02-12 ENCOUNTER — HOSPITAL ENCOUNTER (OUTPATIENT)
Dept: LAB | Facility: MEDICAL CENTER | Age: 63
End: 2018-02-12
Attending: FAMILY MEDICINE
Payer: COMMERCIAL

## 2018-02-12 LAB
ALBUMIN SERPL BCP-MCNC: 4.3 G/DL (ref 3.2–4.9)
ALBUMIN/GLOB SERPL: 1.5 G/DL
ALP SERPL-CCNC: 58 U/L (ref 30–99)
ALT SERPL-CCNC: 13 U/L (ref 2–50)
ANION GAP SERPL CALC-SCNC: 8 MMOL/L (ref 0–11.9)
APPEARANCE UR: CLEAR
AST SERPL-CCNC: 15 U/L (ref 12–45)
BACTERIA #/AREA URNS HPF: NEGATIVE /HPF
BASOPHILS # BLD AUTO: 0.8 % (ref 0–1.8)
BASOPHILS # BLD: 0.04 K/UL (ref 0–0.12)
BILIRUB SERPL-MCNC: 0.6 MG/DL (ref 0.1–1.5)
BILIRUB UR QL STRIP.AUTO: NEGATIVE
BUN SERPL-MCNC: 22 MG/DL (ref 8–22)
C3 SERPL-MCNC: 126 MG/DL (ref 87–200)
C4 SERPL-MCNC: 40 MG/DL (ref 19–52)
CALCIUM SERPL-MCNC: 9.7 MG/DL (ref 8.5–10.5)
CHLORIDE SERPL-SCNC: 102 MMOL/L (ref 96–112)
CHOLEST SERPL-MCNC: 199 MG/DL (ref 100–199)
CO2 SERPL-SCNC: 27 MMOL/L (ref 20–33)
COLOR UR: YELLOW
CREAT SERPL-MCNC: 0.87 MG/DL (ref 0.5–1.4)
CRP SERPL HS-MCNC: 0.34 MG/DL (ref 0–0.75)
CULTURE IF INDICATED INDCX: YES UA CULTURE
EOSINOPHIL # BLD AUTO: 0.06 K/UL (ref 0–0.51)
EOSINOPHIL NFR BLD: 1.3 % (ref 0–6.9)
EPI CELLS #/AREA URNS HPF: NEGATIVE /HPF
ERYTHROCYTE [DISTWIDTH] IN BLOOD BY AUTOMATED COUNT: 45.7 FL (ref 35.9–50)
GLOBULIN SER CALC-MCNC: 2.8 G/DL (ref 1.9–3.5)
GLUCOSE SERPL-MCNC: 68 MG/DL (ref 65–99)
GLUCOSE UR STRIP.AUTO-MCNC: NEGATIVE MG/DL
HCT VFR BLD AUTO: 43.1 % (ref 37–47)
HDLC SERPL-MCNC: 91 MG/DL
HGB BLD-MCNC: 14.2 G/DL (ref 12–16)
HYALINE CASTS #/AREA URNS LPF: NORMAL /LPF
IMM GRANULOCYTES # BLD AUTO: 0.01 K/UL (ref 0–0.11)
IMM GRANULOCYTES NFR BLD AUTO: 0.2 % (ref 0–0.9)
KETONES UR STRIP.AUTO-MCNC: NEGATIVE MG/DL
LDLC SERPL CALC-MCNC: 99 MG/DL
LEUKOCYTE ESTERASE UR QL STRIP.AUTO: ABNORMAL
LYMPHOCYTES # BLD AUTO: 1.24 K/UL (ref 1–4.8)
LYMPHOCYTES NFR BLD: 25.9 % (ref 22–41)
MCH RBC QN AUTO: 32.3 PG (ref 27–33)
MCHC RBC AUTO-ENTMCNC: 32.9 G/DL (ref 33.6–35)
MCV RBC AUTO: 98 FL (ref 81.4–97.8)
MICRO URNS: ABNORMAL
MONOCYTES # BLD AUTO: 0.31 K/UL (ref 0–0.85)
MONOCYTES NFR BLD AUTO: 6.5 % (ref 0–13.4)
NEUTROPHILS # BLD AUTO: 3.12 K/UL (ref 2–7.15)
NEUTROPHILS NFR BLD: 65.3 % (ref 44–72)
NITRITE UR QL STRIP.AUTO: NEGATIVE
NRBC # BLD AUTO: 0 K/UL
NRBC BLD-RTO: 0 /100 WBC
PH UR STRIP.AUTO: 7 [PH]
PLATELET # BLD AUTO: 233 K/UL (ref 164–446)
PMV BLD AUTO: 10.3 FL (ref 9–12.9)
POTASSIUM SERPL-SCNC: 4.3 MMOL/L (ref 3.6–5.5)
PROT SERPL-MCNC: 7.1 G/DL (ref 6–8.2)
PROT UR QL STRIP: NEGATIVE MG/DL
RBC # BLD AUTO: 4.4 M/UL (ref 4.2–5.4)
RBC # URNS HPF: NORMAL /HPF
RBC UR QL AUTO: NEGATIVE
SODIUM SERPL-SCNC: 137 MMOL/L (ref 135–145)
SP GR UR STRIP.AUTO: 1
TRIGL SERPL-MCNC: 43 MG/DL (ref 0–149)
UROBILINOGEN UR STRIP.AUTO-MCNC: 0.2 MG/DL
WBC # BLD AUTO: 4.8 K/UL (ref 4.8–10.8)
WBC #/AREA URNS HPF: NORMAL /HPF

## 2018-02-12 PROCEDURE — 36415 COLL VENOUS BLD VENIPUNCTURE: CPT

## 2018-02-12 PROCEDURE — 80061 LIPID PANEL: CPT

## 2018-02-12 PROCEDURE — 81001 URINALYSIS AUTO W/SCOPE: CPT

## 2018-02-12 PROCEDURE — 87077 CULTURE AEROBIC IDENTIFY: CPT

## 2018-02-12 PROCEDURE — 80053 COMPREHEN METABOLIC PANEL: CPT

## 2018-02-12 PROCEDURE — 86140 C-REACTIVE PROTEIN: CPT

## 2018-02-12 PROCEDURE — 85025 COMPLETE CBC W/AUTO DIFF WBC: CPT

## 2018-02-12 PROCEDURE — 86038 ANTINUCLEAR ANTIBODIES: CPT

## 2018-02-12 PROCEDURE — 86160 COMPLEMENT ANTIGEN: CPT

## 2018-02-12 PROCEDURE — 87086 URINE CULTURE/COLONY COUNT: CPT

## 2018-02-14 LAB
BACTERIA UR CULT: ABNORMAL
BACTERIA UR CULT: ABNORMAL
SIGNIFICANT IND 70042: ABNORMAL
SITE SITE: ABNORMAL
SOURCE SOURCE: ABNORMAL

## 2018-02-16 LAB
NUCLEAR IGG SER QL IA: DETECTED
NUCLEAR IGG TITR SER IF: ABNORMAL {TITER}

## 2018-05-29 ENCOUNTER — OFFICE VISIT (OUTPATIENT)
Dept: RHEUMATOLOGY | Facility: PHYSICIAN GROUP | Age: 63
End: 2018-05-29
Payer: COMMERCIAL

## 2018-05-29 VITALS
WEIGHT: 170.3 LBS | DIASTOLIC BLOOD PRESSURE: 70 MMHG | BODY MASS INDEX: 30.65 KG/M2 | TEMPERATURE: 98.1 F | SYSTOLIC BLOOD PRESSURE: 118 MMHG | RESPIRATION RATE: 16 BRPM | HEART RATE: 77 BPM | OXYGEN SATURATION: 97 %

## 2018-05-29 DIAGNOSIS — M35.9 UNDIFFERENTIATED CONNECTIVE TISSUE DISEASE (HCC): ICD-10-CM

## 2018-05-29 DIAGNOSIS — I73.00 RAYNAUD'S DISEASE WITHOUT GANGRENE: ICD-10-CM

## 2018-05-29 PROCEDURE — 99244 OFF/OP CNSLTJ NEW/EST MOD 40: CPT | Performed by: INTERNAL MEDICINE

## 2018-05-29 RX ORDER — HYDROXYCHLOROQUINE SULFATE 200 MG/1
400 TABLET, FILM COATED ORAL DAILY
Qty: 60 TAB | Refills: 1 | Status: SHIPPED | OUTPATIENT
Start: 2018-05-29 | End: 2018-06-26 | Stop reason: SDUPTHER

## 2018-05-29 ASSESSMENT — ENCOUNTER SYMPTOMS
CHILLS: 0
COUGH: 0
DEPRESSION: 0
FEVER: 0
HEADACHES: 1
VOMITING: 1
ORTHOPNEA: 0
NAUSEA: 1
HEMOPTYSIS: 0
PALPITATIONS: 0

## 2018-05-29 ASSESSMENT — PAIN SCALES - GENERAL: PAINLEVEL: NO PAIN

## 2018-05-29 NOTE — LETTER
[facility]   [address], [address]  [place] [address]  Phone: [phone]  Fax: [phone]              Encounter Date: 5/29/2018    Dear Dr. Eugene,    It was a pleasure seeing your patient, Zakiya Kelly, on 5/29/2018. Diagnoses of Undifferentiated connective tissue disease (HCC) and Raynaud's disease without gangrene were pertinent to this visit.     Please find attached progress note which includes the history I obtained from Ms. Kelly, my physical examination findings, my impression and recommendations.      Once again, it was a pleasure participating in your patient's care.  Please feel free to contact me if you have any questions or if I can be of any further assistance to your patients.      Sincerely,    Angélica Packer M.D.  Electronically Signed          PROGRESS NOTE:  Chief Complaint- urticaria    Chief Complaint   Patient presents with   • New Patient     Rash     Zakiya Kelly is a 62 y.o. female here as a new Rheumatology Consult referred by Marlys Herzog M.D. for consultation regarding positive SHIRAZ, urticaria    HPI:     Ms. Zakiya Kelly  was first evaluated by Dr. Ramon Eugene for possible autoimmune syndrome March 11, 2011 with a history of recurrent pruritic rash that started about Thanksgiving of 2010. At the time she relates she correlated that the onset of symptoms preceded was preceded by the use of antibiotic therapy for an upper respiratory infection. She presented with a known positive SHIRAZ that had been ongoing for 7-8 years and a sensitivity to aspirin in the form of GI symptoms. The rash brought on with the antibiotic therapy seems to be aggravated with cold weather affecting the elbows as well as the axilla, inguinal area and also diffusely involving the neck and shoulders. At her initial visit she denied any small joint arthritis photosensitivity or chest pain and 3 consistent with pleurisy or pericardial pain.      Her initial  evaluation included evaluation of CRP C2 complement C3 and C4 cryoglobulins, cryofibrinogen and cold agglutinins as well as Anka levels and a titered SHIRAZ as well as anti-TPO antibodies. The results of the lab noted in a notation on March 25, 2011 states that last from March 17, 2011 showed an negative ANCA level, SHIRAZ was 1:1280 diffuse pattern,    THyroid Peroxidase Antibody was 45 the C3 was normal at 143 and the C4 was normal at 44 and a CO2 was normal at within normal limits. CRP was slightly elevated at 6.4. Her cold agglutinins, cryoglobulins, cryofibrinogen were negative she was otherwise diagnosed with recurrent pruritic rash associated with a height Hayter SHIRAZ consider for undifferentiated connective tissue disease and was started on Plaquenil 200 mg by mouth daily March 25, 2011    Next follow-up note that I do have is from February 6, 2015 at the time she has noted to have Raynaud's primarily of the index finger somewhat left greater than right as well as her recurrent urticarial rash that affects the neck and the elbows. She was maintained on Plaquenil and 200 mg 2 tablets by mouth daily throughout 2015 and 26 units him that she was evaluated at six-month intervals and continued to have episodes of Raynaud's however peripheral pulses were full and symmetric she is maintained on Plaquenil during her care and her last visit her second to last visit February 17, 2017 she had undifferentiated connective tissue disease based on a high titer SHIRAZ recurrent urticarial rash as well as Raynaud's phenomenon has been clinically quiescent on hydroxychloroquine. At her last visit in August 18, 2017 she underwent a hemorrhoidectomy in July 17, 2017 but otherwise she has been quiescent she has sometimes a mild rash on her neck but generally dermatologic manifestations were quiesced and no arthritis there was noted mild mottling of the skin and the plan was to continue hydroxychloroquine and Complements checked at titer  SHIRAZ a UA and a CBC CMP check.    Today the patient denies any morning stiffness or swollen joints are stiff hands or any unusual hair loss. She does admit to some photosensitivity due to her lisinopril. She also admits to Raynauds. As well as night sweats due to postmenopausal symptoms. Otherwise she has no fevers or chills or any eye redness or eye inflammation. She does have occasional headaches as well as sinus trouble and drainage in the back the throat, and also fatigue. She also admits to some nausea sometimes some vomiting sometimes and also aspirin will cause her to get ulcers. She has IBS and so will experience intermittent episodes of diarrhea.  She has a history of vertigo and has been told it is related to allergies.    She denies any chest pain or palpitations or shortness of breath. She denies any dysphagia or sore throat. She denies any oral ulcers or cavities or sore tongue. She denies any eye pain or eye inflammation.    Past medical History:     Family History:  Sister had SLE, thyroid dysfunction - Hashimoto; father had bladder cancer     Social History:  Just started working at an aesthetics - , NEVER smoker, social alcohol      Current medicines (including changes today)  Current Outpatient Prescriptions   Medication Sig Dispense Refill   • hydroxychloroquine (PLAQUENIL) 200 MG Tab Take 2 Tabs by mouth every day. Except for Sunday take 1.5 tablets 60 Tab 1   • Cyanocobalamin (VITAMIN B-12 PO) Take  by mouth every day.     • cetirizine (ZYRTEC) 10 MG TABS Take 10 mg by mouth every day.     • Cholecalciferol (VITAMIN D) 2000 UNIT CAPS Take  by mouth every day.     • Vitamin Mixture (VLADISLAV-C PO) Take 1,000 mg by mouth every day.     • Acetaminophen (TYLENOL EXTRA STRENGTH PO) Take  by mouth as needed.     • lisinopril (PRINIVIL) 10 MG TABS Take 10 mg by mouth every day.     • meclizine (ANTIVERT) 25 MG Tab Take 1 Tab by mouth 3 times a day as needed. 30 Tab 0   • oxycodone-acetaminophen  (PERCOCET) 5-325 MG Tab Take 1-2 Tabs by mouth every four hours as needed (pain). 60 Tab 0   • polyethylene glycol 3350 (MIRALAX) Powder Take 17 g by mouth as needed (constipation). 1 Bottle prn   • ondansetron (ZOFRAN) 4 MG Tab tablet Take 1 Tab by mouth every four hours as needed for Nausea/Vomiting (nausea). 20 Tab 1     No current facility-administered medications for this visit.      She  has a past medical history of Anesthesia; Bowel habit changes; CATARACT; HTN (hypertension); Mixed connective tissue disease (CMS-HCC) (HCC); Seasonal allergies; and Snoring.  She  has a past surgical history that includes other orthopedic surgery; carpal tunnel release; other orthopedic surgery; tonsillectomy; cataract phaco with iol (7/12/2010); bunionectomy kym (4/5/2012); internal fixation removal (6/11/2012); cataract phaco with iol (3/19/2014); bunionectomy kym (Right, 9/8/2016); and hemorrhoidectomy (7/14/2017).  No family history on file.  No family status information on file.     Social History   Substance Use Topics   • Smoking status: Never Smoker   • Smokeless tobacco: Never Used   • Alcohol use Yes      Comment: 6 per week     Social History     Social History Narrative   • No narrative on file         ROS    Review of Systems   Constitutional: Positive for malaise/fatigue. Negative for chills and fever.   HENT: Negative for tinnitus.    Respiratory: Negative for cough and hemoptysis.    Cardiovascular: Negative for chest pain, palpitations and orthopnea.   Gastrointestinal: Positive for nausea and vomiting.   Genitourinary: Negative for hematuria.   Musculoskeletal: Negative for joint pain.   Skin: Positive for rash.   Neurological: Positive for headaches.   Psychiatric/Behavioral: Negative for depression and suicidal ideas.          Objective:     Blood pressure 118/70, pulse 77, temperature 36.7 °C (98.1 °F), resp. rate 16, weight 77.2 kg (170 lb 4.8 oz), SpO2 97 %. Body mass index is 30.65  kg/m².  Physical Exam:    Vitals:    05/29/18 1611   BP: 118/70   Pulse: 77   Resp: 16   Temp: 36.7 °C (98.1 °F)   SpO2: 97%   Weight: 77.2 kg (170 lb 4.8 oz)    Body mass index is 30.65 kg/m².    General/Constitutional: NAD not diaphoretic, comfortable  Eyes: clear conjunctiva, no scleral icterus, EOMI  Ears, Nose, Mouth,Throat: no oral ulcers, good dentition, moist mucous membranes, no discharge from ears bilaterally; torus noted on hard palate.  Cardiovascular: regular rate and rhythm.  No murmurs, gallops, rubs  Respiratory: normal effort, unlabored respiration.  On auscultation no wheezes, rales, rhonchi.  Clear to auscultation.  GI: soft, NTTP no hepatosplenomegaly, nondistended  Musculoskeletal  Axial:  Thoracic: no kyphosis  Upper Extremities:  No synovitis of the PIP, DIP, MCP  Wrists and Elbows have good ROM  Muscle Strength: 5/5 in deltoids, biceps, triceps, finger , wrists extension bilateral  Lower Extremities:  No knee effusion bilateral, No crepitus bilateral  No MTP tenderness bilateral  Muscle Strength: 5/5 in dorsiflexion, plantarflexion, knee extension, knee flexion, and hip flexion bilateral  Gait is normal  Skin: Limited skin exam.  no rashes, no digital ulcerations, no alopecia, no tophi, no skin thickening, no nodules  Neuro: CN II-XII grossly intact, Alert, Oriented x 3, moves all four extremities  Psych: normal affect, normal mood, judgement appropriate, follows commands, responses are appropritae  Heme/Lymph: no cervical adenopathy      No results found for: QNTTBGOLD  No results found for: HEPBCORTOT, HEPBCORIGM, HEPBSAG  No results found for: HEPCAB  Lab Results   Component Value Date/Time    SODIUM 137 02/12/2018 06:09 AM    POTASSIUM 4.3 02/12/2018 06:09 AM    CHLORIDE 102 02/12/2018 06:09 AM    CO2 27 02/12/2018 06:09 AM    GLUCOSE 68 02/12/2018 06:09 AM    BUN 22 02/12/2018 06:09 AM    CREATININE 0.87 02/12/2018 06:09 AM      Lab Results   Component Value Date/Time    WBC 4.8  02/12/2018 06:09 AM    RBC 4.40 02/12/2018 06:09 AM    HEMOGLOBIN 14.2 02/12/2018 06:09 AM    HEMATOCRIT 43.1 02/12/2018 06:09 AM    MCV 98.0 (H) 02/12/2018 06:09 AM    MCH 32.3 02/12/2018 06:09 AM    MCHC 32.9 (L) 02/12/2018 06:09 AM    MPV 10.3 02/12/2018 06:09 AM    NEUTSPOLYS 65.30 02/12/2018 06:09 AM    LYMPHOCYTES 25.90 02/12/2018 06:09 AM    MONOCYTES 6.50 02/12/2018 06:09 AM    EOSINOPHILS 1.30 02/12/2018 06:09 AM    BASOPHILS 0.80 02/12/2018 06:09 AM      Lab Results   Component Value Date/Time    CALCIUM 9.7 02/12/2018 06:09 AM    ASTSGOT 15 02/12/2018 06:09 AM    ALTSGPT 13 02/12/2018 06:09 AM    ALKPHOSPHAT 58 02/12/2018 06:09 AM    TBILIRUBIN 0.6 02/12/2018 06:09 AM    ALBUMIN 4.3 02/12/2018 06:09 AM    TOTPROTEIN 7.1 02/12/2018 06:09 AM     Lab Results   Component Value Date/Time    ANTINUCAB Detected (A) 02/12/2018 06:09 AM     Lab Results   Component Value Date/Time    SEDRATEWES 7 08/15/2012 12:21 PM    CREACTPROT 0.34 02/12/2018 06:09 AM     No results found for: RAMIRO YOUNG  Lab Results   Component Value Date/Time    W9PQMEPQISR 126.0 02/12/2018 06:09 AM    A2HUGYUERGS 40.0 02/12/2018 06:09 AM     Lab Results   Component Value Date/Time    ANTIDNADS Detected (A) 08/25/2011 02:02 PM    RNPAB 2 08/25/2011 02:02 PM    SMITHAB 0 08/25/2011 02:02 PM     Lab Results   Component Value Date/Time    ANTIDNADS Detected (A) 08/25/2011 02:02 PM    DSDNA <1:10 08/25/2011 02:02 PM    H3IHVLMUSLV 126.0 02/12/2018 06:09 AM    RNPAB 2 08/25/2011 02:02 PM    ANTISSASJ 2 08/25/2011 02:02 PM    ANTISSBSJ 2 08/25/2011 02:02 PM     Lab Results   Component Value Date/Time    COLORURINE Yellow 02/12/2018 06:09 AM    SPECGRAVITY 1.005 02/12/2018 06:09 AM    PHURINE 7.0 02/12/2018 06:09 AM    GLUCOSEUR Negative 02/12/2018 06:09 AM    KETONES Negative 02/12/2018 06:09 AM    PROTEINURIN Negative 02/12/2018 06:09 AM     No results found for: TOTPROTUR, TOTALVOLUME, SQGLRPFF95  No results found for: SSA60,  SSA52  No results found for: HBA1C  No results found for: CPKTOTAL  No results found for: G6PD  No results found for: VOST38EAYU  No results found for: ACESERUM  No results found for: 25HYDROXY  No results found for: TSH, FREEDIR  Lab Results   Component Value Date/Time    TSHULTRASEN 1.910 04/02/2013 01:22 PM     No results found for: MICROSOMALA, ANTITHYROGL  No results found for: IGGLYMEABS  No results found for: ANTIMITOCHO, FACTIN  Lab Results   Component Value Date/Time     08/15/2012 12:21 PM    TTRANSIGA 8 08/15/2012 12:21 PM     No results found for: FLTYPE, CRYSTALSBDF  No results found for: ISTATICAL  No results found for: ISTATCREAT  No results found for: CTELOPEP  No results found for: GBMABG  No results found for: PTHINTACT          Results for orders placed during the hospital encounter of 10/21/16   DS-BONE DENSITY STUDY (DEXA)    Impression According to the World Health Organization classification, bone mineral density of this patient is osteopenic and significantly worsening.    10-year Probability of Fracture:  Major Osteoporotic     8.2%  Hip     0.7%  Population      [place] ()    Based on right femur neck BMD          INTERPRETING LOCATION:  [address] [place] [address], [facility]                  Assessment and Plan:  Ms. Zakiya Kelly presents for evaluation with UCTD with manifestations of positive SHIRAZ, Raynauds, uriticarial rash.  She has been well maintained on plaquenil.  We will adjust the dose to her weight for her plaquenil such that she will take one day of the week and decrease to 300 mg po q day.    I willl check scl 70 and centromere  And RF and SSA, SSB, RNP, SMith for further evaluation.    1. Undifferentiated connective tissue disease (HCC)  SHIRAZ ANTIBODY WITH REFLEX    CHROMATIN AB,IGG    ANTI-DNA (DS)    COMPLEMENT C3    COMPLEMENT C4    CBC WITH DIFFERENTIAL    COMP METABOLIC PANEL    G6PD QUANT + RBC    WESTERGREN SED RATE    CRP QUANTITIVE (NON-CARDIAC)     COMPLEMENT TOTAL (CH50)    ANTI SCL-70 ABS    CENTROMERE AB, IGG    RHEUMATOID ARTHRITIS FACTOR   2. Raynaud's disease without gangrene  SHIRAZ ANTIBODY WITH REFLEX    CHROMATIN AB,IGG    ANTI-DNA (DS)    COMPLEMENT C3    COMPLEMENT C4    CBC WITH DIFFERENTIAL    COMP METABOLIC PANEL    G6PD QUANT + RBC    WESTERGREN SED RATE    CRP QUANTITIVE (NON-CARDIAC)    COMPLEMENT TOTAL (CH50)    ANTI SCL-70 ABS    CENTROMERE AB, IGG       Followup: Return in about 8 weeks (around 7/26/2018), or 7:20. or sooner prn  Patient was seen 60 minutes face-to-face (excluding time for procedures)  of which more than 50% the time was spent counseling the patient regarding  rheumatological conditions and care. Therapy was discussed in detail.  Thank you for this referral.

## 2018-05-29 NOTE — PROGRESS NOTES
Chief Complaint- urticaria    Chief Complaint   Patient presents with   • New Patient     Rash     Zakiya Kelly is a 62 y.o. female here as a new Rheumatology Consult referred by Marlys Herzog M.D. for consultation regarding positive SHIRAZ, urticaria    HPI:     Ms. Zakiya Kelly  was first evaluated by Dr. Ramon Eugene for possible autoimmune syndrome March 11, 2011 with a history of recurrent pruritic rash that started about Thanksgiving of 2010. At the time she relates she correlated that the onset of symptoms preceded was preceded by the use of antibiotic therapy for an upper respiratory infection. She presented with a known positive SHIRAZ that had been ongoing for 7-8 years and a sensitivity to aspirin in the form of GI symptoms. The rash brought on with the antibiotic therapy seems to be aggravated with cold weather affecting the elbows as well as the axilla, inguinal area and also diffusely involving the neck and shoulders. At her initial visit she denied any small joint arthritis photosensitivity or chest pain and 3 consistent with pleurisy or pericardial pain.      Her initial evaluation included evaluation of CRP C2 complement C3 and C4 cryoglobulins, cryofibrinogen and cold agglutinins as well as Anka levels and a titered SHIRAZ as well as anti-TPO antibodies. The results of the lab noted in a notation on March 25, 2011 states that last from March 17, 2011 showed an negative ANCA level, SHIRAZ was 1:1280 diffuse pattern,    THyroid Peroxidase Antibody was 45 the C3 was normal at 143 and the C4 was normal at 44 and a CO2 was normal at within normal limits. CRP was slightly elevated at 6.4. Her cold agglutinins, cryoglobulins, cryofibrinogen were negative she was otherwise diagnosed with recurrent pruritic rash associated with a height Hayter SHIRAZ consider for undifferentiated connective tissue disease and was started on Plaquenil 200 mg by mouth daily March 25, 2011    Next follow-up note that I do have  is from February 6, 2015 at the time she has noted to have Raynaud's primarily of the index finger somewhat left greater than right as well as her recurrent urticarial rash that affects the neck and the elbows. She was maintained on Plaquenil and 200 mg 2 tablets by mouth daily throughout 2015 and 26 units him that she was evaluated at six-month intervals and continued to have episodes of Raynaud's however peripheral pulses were full and symmetric she is maintained on Plaquenil during her care and her last visit her second to last visit February 17, 2017 she had undifferentiated connective tissue disease based on a high titer SHIRAZ recurrent urticarial rash as well as Raynaud's phenomenon has been clinically quiescent on hydroxychloroquine. At her last visit in August 18, 2017 she underwent a hemorrhoidectomy in July 17, 2017 but otherwise she has been quiescent she has sometimes a mild rash on her neck but generally dermatologic manifestations were quiesced and no arthritis there was noted mild mottling of the skin and the plan was to continue hydroxychloroquine and Complements checked at titer SHIRAZ a UA and a CBC CMP check.    Today the patient denies any morning stiffness or swollen joints are stiff hands or any unusual hair loss. She does admit to some photosensitivity due to her lisinopril. She also admits to Raynauds. As well as night sweats due to postmenopausal symptoms. Otherwise she has no fevers or chills or any eye redness or eye inflammation. She does have occasional headaches as well as sinus trouble and drainage in the back the throat, and also fatigue. She also admits to some nausea sometimes some vomiting sometimes and also aspirin will cause her to get ulcers. She has IBS and so will experience intermittent episodes of diarrhea.  She has a history of vertigo and has been told it is related to allergies.    She denies any chest pain or palpitations or shortness of breath. She denies any dysphagia or  sore throat. She denies any oral ulcers or cavities or sore tongue. She denies any eye pain or eye inflammation.    Past medical History:     Family History:  Sister had SLE, thyroid dysfunction - Hashimoto; father had bladder cancer     Social History:  Just started working at an aesthetics - , NEVER smoker, social alcohol      Current medicines (including changes today)  Current Outpatient Prescriptions   Medication Sig Dispense Refill   • hydroxychloroquine (PLAQUENIL) 200 MG Tab Take 2 Tabs by mouth every day. Except for Sunday take 1.5 tablets 60 Tab 1   • Cyanocobalamin (VITAMIN B-12 PO) Take  by mouth every day.     • cetirizine (ZYRTEC) 10 MG TABS Take 10 mg by mouth every day.     • Cholecalciferol (VITAMIN D) 2000 UNIT CAPS Take  by mouth every day.     • Vitamin Mixture (VLADISLAV-C PO) Take 1,000 mg by mouth every day.     • Acetaminophen (TYLENOL EXTRA STRENGTH PO) Take  by mouth as needed.     • lisinopril (PRINIVIL) 10 MG TABS Take 10 mg by mouth every day.     • meclizine (ANTIVERT) 25 MG Tab Take 1 Tab by mouth 3 times a day as needed. 30 Tab 0   • oxycodone-acetaminophen (PERCOCET) 5-325 MG Tab Take 1-2 Tabs by mouth every four hours as needed (pain). 60 Tab 0   • polyethylene glycol 3350 (MIRALAX) Powder Take 17 g by mouth as needed (constipation). 1 Bottle prn   • ondansetron (ZOFRAN) 4 MG Tab tablet Take 1 Tab by mouth every four hours as needed for Nausea/Vomiting (nausea). 20 Tab 1     No current facility-administered medications for this visit.      She  has a past medical history of Anesthesia; Bowel habit changes; CATARACT; HTN (hypertension); Mixed connective tissue disease (CMS-HCC) (HCC); Seasonal allergies; and Snoring.  She  has a past surgical history that includes other orthopedic surgery; carpal tunnel release; other orthopedic surgery; tonsillectomy; cataract phaco with iol (7/12/2010); bunionectomy kym (4/5/2012); internal fixation removal (6/11/2012); cataract phaco  with iol (3/19/2014); bunionectomy kym (Right, 9/8/2016); and hemorrhoidectomy (7/14/2017).  No family history on file.  No family status information on file.     Social History   Substance Use Topics   • Smoking status: Never Smoker   • Smokeless tobacco: Never Used   • Alcohol use Yes      Comment: 6 per week     Social History     Social History Narrative   • No narrative on file         ROS    Review of Systems   Constitutional: Positive for malaise/fatigue. Negative for chills and fever.   HENT: Negative for tinnitus.    Respiratory: Negative for cough and hemoptysis.    Cardiovascular: Negative for chest pain, palpitations and orthopnea.   Gastrointestinal: Positive for nausea and vomiting.   Genitourinary: Negative for hematuria.   Musculoskeletal: Negative for joint pain.   Skin: Positive for rash.   Neurological: Positive for headaches.   Psychiatric/Behavioral: Negative for depression and suicidal ideas.          Objective:     Blood pressure 118/70, pulse 77, temperature 36.7 °C (98.1 °F), resp. rate 16, weight 77.2 kg (170 lb 4.8 oz), SpO2 97 %. Body mass index is 30.65 kg/m².  Physical Exam:    Vitals:    05/29/18 1611   BP: 118/70   Pulse: 77   Resp: 16   Temp: 36.7 °C (98.1 °F)   SpO2: 97%   Weight: 77.2 kg (170 lb 4.8 oz)    Body mass index is 30.65 kg/m².    General/Constitutional: NAD not diaphoretic, comfortable  Eyes: clear conjunctiva, no scleral icterus, EOMI  Ears, Nose, Mouth,Throat: no oral ulcers, good dentition, moist mucous membranes, no discharge from ears bilaterally; torus noted on hard palate.  Cardiovascular: regular rate and rhythm.  No murmurs, gallops, rubs  Respiratory: normal effort, unlabored respiration.  On auscultation no wheezes, rales, rhonchi.  Clear to auscultation.  GI: soft, NTTP no hepatosplenomegaly, nondistended  Musculoskeletal  Axial:  Thoracic: no kyphosis  Upper Extremities:  No synovitis of the PIP, DIP, MCP  Wrists and Elbows have good ROM  Muscle Strength:  5/5 in deltoids, biceps, triceps, finger , wrists extension bilateral  Lower Extremities:  No knee effusion bilateral, No crepitus bilateral  No MTP tenderness bilateral  Muscle Strength: 5/5 in dorsiflexion, plantarflexion, knee extension, knee flexion, and hip flexion bilateral  Gait is normal  Skin: Limited skin exam.  no rashes, no digital ulcerations, no alopecia, no tophi, no skin thickening, no nodules  Neuro: CN II-XII grossly intact, Alert, Oriented x 3, moves all four extremities  Psych: normal affect, normal mood, judgement appropriate, follows commands, responses are appropritae  Heme/Lymph: no cervical adenopathy      No results found for: QNTTBGOLD  No results found for: HEPBCORTOT, HEPBCORIGM, HEPBSAG  No results found for: HEPCAB  Lab Results   Component Value Date/Time    SODIUM 137 02/12/2018 06:09 AM    POTASSIUM 4.3 02/12/2018 06:09 AM    CHLORIDE 102 02/12/2018 06:09 AM    CO2 27 02/12/2018 06:09 AM    GLUCOSE 68 02/12/2018 06:09 AM    BUN 22 02/12/2018 06:09 AM    CREATININE 0.87 02/12/2018 06:09 AM      Lab Results   Component Value Date/Time    WBC 4.8 02/12/2018 06:09 AM    RBC 4.40 02/12/2018 06:09 AM    HEMOGLOBIN 14.2 02/12/2018 06:09 AM    HEMATOCRIT 43.1 02/12/2018 06:09 AM    MCV 98.0 (H) 02/12/2018 06:09 AM    MCH 32.3 02/12/2018 06:09 AM    MCHC 32.9 (L) 02/12/2018 06:09 AM    MPV 10.3 02/12/2018 06:09 AM    NEUTSPOLYS 65.30 02/12/2018 06:09 AM    LYMPHOCYTES 25.90 02/12/2018 06:09 AM    MONOCYTES 6.50 02/12/2018 06:09 AM    EOSINOPHILS 1.30 02/12/2018 06:09 AM    BASOPHILS 0.80 02/12/2018 06:09 AM      Lab Results   Component Value Date/Time    CALCIUM 9.7 02/12/2018 06:09 AM    ASTSGOT 15 02/12/2018 06:09 AM    ALTSGPT 13 02/12/2018 06:09 AM    ALKPHOSPHAT 58 02/12/2018 06:09 AM    TBILIRUBIN 0.6 02/12/2018 06:09 AM    ALBUMIN 4.3 02/12/2018 06:09 AM    TOTPROTEIN 7.1 02/12/2018 06:09 AM     Lab Results   Component Value Date/Time    ANTINUCAB Detected (A) 02/12/2018 06:09 AM      Lab Results   Component Value Date/Time    SEDRATEWES 7 08/15/2012 12:21 PM    CREACTPROT 0.34 02/12/2018 06:09 AM     No results found for: DR HECTORVVTINTERP  Lab Results   Component Value Date/Time    A9YZAPOOKMX 126.0 02/12/2018 06:09 AM    S2PHUZPPBTB 40.0 02/12/2018 06:09 AM     Lab Results   Component Value Date/Time    ANTIDNADS Detected (A) 08/25/2011 02:02 PM    RNPAB 2 08/25/2011 02:02 PM    SMITHAB 0 08/25/2011 02:02 PM     Lab Results   Component Value Date/Time    ANTIDNADS Detected (A) 08/25/2011 02:02 PM    DSDNA <1:10 08/25/2011 02:02 PM    T7HQTCNMWOR 126.0 02/12/2018 06:09 AM    RNPAB 2 08/25/2011 02:02 PM    ANTISSASJ 2 08/25/2011 02:02 PM    ANTISSBSJ 2 08/25/2011 02:02 PM     Lab Results   Component Value Date/Time    COLORURINE Yellow 02/12/2018 06:09 AM    SPECGRAVITY 1.005 02/12/2018 06:09 AM    PHURINE 7.0 02/12/2018 06:09 AM    GLUCOSEUR Negative 02/12/2018 06:09 AM    KETONES Negative 02/12/2018 06:09 AM    PROTEINURIN Negative 02/12/2018 06:09 AM     No results found for: TOTPROTUR, TOTALVOLUME, CGWBZQEQ16  No results found for: SSA60, SSA52  No results found for: HBA1C  No results found for: CPKTOTAL  No results found for: G6PD  No results found for: FFDD99TPET  No results found for: ACESERUM  No results found for: 25HYDROXY  No results found for: TSH, FREEDIR  Lab Results   Component Value Date/Time    TSHULTRASEN 1.910 04/02/2013 01:22 PM     No results found for: MICROSOMALA, ANTITHYROGL  No results found for: IGGLYMEABS  No results found for: ANTIMITOCHO, FACTIN  Lab Results   Component Value Date/Time     08/15/2012 12:21 PM    TTRANSIGA 8 08/15/2012 12:21 PM     No results found for: FLTYPE, CRYSTALSBDF  No results found for: ISTATICAL  No results found for: ISTATCREAT  No results found for: CTELOPEP  No results found for: GBMABG  No results found for: PTHINTACT          Results for orders placed during the hospital encounter of 10/21/16   DS-BONE DENSITY STUDY  (DEXA)    Impression According to the World Health Organization classification, bone mineral density of this patient is osteopenic and significantly worsening.    10-year Probability of Fracture:  Major Osteoporotic     8.2%  Hip     0.7%  Population      [place] ()    Based on right femur neck BMD          INTERPRETING LOCATION:  [address] [place] [address], [place], [address]                  Assessment and Plan:  Ms. Zakiya Kelly presents for evaluation with UCTD with manifestations of positive SHIRAZ, Raynauds, uriticarial rash.  She has been well maintained on plaquenil.  We will adjust the dose to her weight for her plaquenil such that she will take one day of the week and decrease to 300 mg po q day.    I willl check scl 70 and centromere  And RF and SSA, SSB, RNP, SMith for further evaluation.    1. Undifferentiated connective tissue disease (HCC)  SHIRAZ ANTIBODY WITH REFLEX    CHROMATIN AB,IGG    ANTI-DNA (DS)    COMPLEMENT C3    COMPLEMENT C4    CBC WITH DIFFERENTIAL    COMP METABOLIC PANEL    G6PD QUANT + RBC    WESTERGREN SED RATE    CRP QUANTITIVE (NON-CARDIAC)    COMPLEMENT TOTAL (CH50)    ANTI SCL-70 ABS    CENTROMERE AB, IGG    RHEUMATOID ARTHRITIS FACTOR   2. Raynaud's disease without gangrene  SHIRAZ ANTIBODY WITH REFLEX    CHROMATIN AB,IGG    ANTI-DNA (DS)    COMPLEMENT C3    COMPLEMENT C4    CBC WITH DIFFERENTIAL    COMP METABOLIC PANEL    G6PD QUANT + RBC    WESTERGREN SED RATE    CRP QUANTITIVE (NON-CARDIAC)    COMPLEMENT TOTAL (CH50)    ANTI SCL-70 ABS    CENTROMERE AB, IGG       Followup: Return in about 8 weeks (around 7/26/2018), or 7:20. or sooner prn  Patient was seen 60 minutes face-to-face (excluding time for procedures)  of which more than 50% the time was spent counseling the patient regarding  rheumatological conditions and care. Therapy was discussed in detail.  Thank you for this referral.

## 2018-06-26 DIAGNOSIS — M35.9 DIFFUSE DISEASE OF CONNECTIVE TISSUE (HCC): Primary | ICD-10-CM

## 2018-06-26 RX ORDER — HYDROXYCHLOROQUINE SULFATE 200 MG/1
400 TABLET, FILM COATED ORAL DAILY
Qty: 60 TAB | Refills: 1 | Status: SHIPPED | OUTPATIENT
Start: 2018-06-26 | End: 2018-07-18

## 2018-06-26 NOTE — TELEPHONE ENCOUNTER
Was the patient seen in the last year in this department? Yes  5/29/18 labs 02/12/18    Does patient have an active prescription for medications requested? No     Received Request Via: Pharmacy

## 2018-07-18 ENCOUNTER — HOSPITAL ENCOUNTER (OUTPATIENT)
Dept: LAB | Facility: MEDICAL CENTER | Age: 63
End: 2018-07-18
Attending: INTERNAL MEDICINE
Payer: COMMERCIAL

## 2018-07-18 ENCOUNTER — HOSPITAL ENCOUNTER (OUTPATIENT)
Dept: LAB | Facility: MEDICAL CENTER | Age: 63
End: 2018-07-18
Attending: FAMILY MEDICINE
Payer: COMMERCIAL

## 2018-07-18 DIAGNOSIS — I73.00 RAYNAUD'S DISEASE WITHOUT GANGRENE: ICD-10-CM

## 2018-07-18 DIAGNOSIS — M35.9 UNDIFFERENTIATED CONNECTIVE TISSUE DISEASE (HCC): ICD-10-CM

## 2018-07-18 LAB
BASOPHILS # BLD AUTO: 0.7 % (ref 0–1.8)
BASOPHILS # BLD: 0.04 K/UL (ref 0–0.12)
C3 SERPL-MCNC: 134 MG/DL (ref 87–200)
C4 SERPL-MCNC: 39 MG/DL (ref 19–52)
CHOLEST SERPL-MCNC: 210 MG/DL (ref 100–199)
EOSINOPHIL # BLD AUTO: 0.03 K/UL (ref 0–0.51)
EOSINOPHIL NFR BLD: 0.6 % (ref 0–6.9)
ERYTHROCYTE [DISTWIDTH] IN BLOOD BY AUTOMATED COUNT: 45.6 FL (ref 35.9–50)
ERYTHROCYTE [SEDIMENTATION RATE] IN BLOOD BY WESTERGREN METHOD: 10 MM/HOUR (ref 0–30)
HCT VFR BLD AUTO: 42.4 % (ref 37–47)
HDLC SERPL-MCNC: 89 MG/DL
HGB BLD-MCNC: 14 G/DL (ref 12–16)
IMM GRANULOCYTES # BLD AUTO: 0.01 K/UL (ref 0–0.11)
IMM GRANULOCYTES NFR BLD AUTO: 0.2 % (ref 0–0.9)
LDLC SERPL CALC-MCNC: 107 MG/DL
LYMPHOCYTES # BLD AUTO: 1.05 K/UL (ref 1–4.8)
LYMPHOCYTES NFR BLD: 19.5 % (ref 22–41)
MCH RBC QN AUTO: 32.5 PG (ref 27–33)
MCHC RBC AUTO-ENTMCNC: 33 G/DL (ref 33.6–35)
MCV RBC AUTO: 98.4 FL (ref 81.4–97.8)
MONOCYTES # BLD AUTO: 0.39 K/UL (ref 0–0.85)
MONOCYTES NFR BLD AUTO: 7.2 % (ref 0–13.4)
NEUTROPHILS # BLD AUTO: 3.86 K/UL (ref 2–7.15)
NEUTROPHILS NFR BLD: 71.8 % (ref 44–72)
NRBC # BLD AUTO: 0 K/UL
NRBC BLD-RTO: 0 /100 WBC
PLATELET # BLD AUTO: 199 K/UL (ref 164–446)
PMV BLD AUTO: 10.2 FL (ref 9–12.9)
RBC # BLD AUTO: 4.31 M/UL (ref 4.2–5.4)
RHEUMATOID FACT SER IA-ACNC: <10 IU/ML (ref 0–14)
TRIGL SERPL-MCNC: 69 MG/DL (ref 0–149)
TSH SERPL DL<=0.005 MIU/L-ACNC: 1.68 UIU/ML (ref 0.38–5.33)
WBC # BLD AUTO: 5.4 K/UL (ref 4.8–10.8)

## 2018-07-18 PROCEDURE — 86235 NUCLEAR ANTIGEN ANTIBODY: CPT

## 2018-07-18 PROCEDURE — 83516 IMMUNOASSAY NONANTIBODY: CPT

## 2018-07-18 PROCEDURE — 85652 RBC SED RATE AUTOMATED: CPT

## 2018-07-18 PROCEDURE — 86038 ANTINUCLEAR ANTIBODIES: CPT

## 2018-07-18 PROCEDURE — 86160 COMPLEMENT ANTIGEN: CPT | Mod: 91

## 2018-07-18 PROCEDURE — 84443 ASSAY THYROID STIM HORMONE: CPT

## 2018-07-18 PROCEDURE — 86162 COMPLEMENT TOTAL (CH50): CPT

## 2018-07-18 PROCEDURE — 36415 COLL VENOUS BLD VENIPUNCTURE: CPT

## 2018-07-18 PROCEDURE — 80061 LIPID PANEL: CPT

## 2018-07-18 PROCEDURE — 86431 RHEUMATOID FACTOR QUANT: CPT

## 2018-07-18 PROCEDURE — 86140 C-REACTIVE PROTEIN: CPT

## 2018-07-18 PROCEDURE — 86225 DNA ANTIBODY NATIVE: CPT

## 2018-07-18 PROCEDURE — 82955 ASSAY OF G6PD ENZYME: CPT

## 2018-07-18 PROCEDURE — 80053 COMPREHEN METABOLIC PANEL: CPT

## 2018-07-18 PROCEDURE — 85025 COMPLETE CBC W/AUTO DIFF WBC: CPT

## 2018-07-19 LAB
ALBUMIN SERPL BCP-MCNC: 4.5 G/DL (ref 3.2–4.9)
ALBUMIN/GLOB SERPL: 1.7 G/DL
ALP SERPL-CCNC: 60 U/L (ref 30–99)
ALT SERPL-CCNC: 18 U/L (ref 2–50)
ANION GAP SERPL CALC-SCNC: 11 MMOL/L (ref 0–11.9)
AST SERPL-CCNC: 18 U/L (ref 12–45)
BILIRUB SERPL-MCNC: 0.7 MG/DL (ref 0.1–1.5)
BUN SERPL-MCNC: 21 MG/DL (ref 8–22)
CALCIUM SERPL-MCNC: 9.6 MG/DL (ref 8.5–10.5)
CHLORIDE SERPL-SCNC: 103 MMOL/L (ref 96–112)
CO2 SERPL-SCNC: 26 MMOL/L (ref 20–33)
CREAT SERPL-MCNC: 0.98 MG/DL (ref 0.5–1.4)
CRP SERPL HS-MCNC: 0.24 MG/DL (ref 0–0.75)
DSDNA AB TITR SER CLIF: NORMAL {TITER}
G6PD RBC-CCNC: 8.4 U/G HB (ref 9.9–16.6)
GLOBULIN SER CALC-MCNC: 2.7 G/DL (ref 1.9–3.5)
GLUCOSE SERPL-MCNC: 73 MG/DL (ref 65–99)
POTASSIUM SERPL-SCNC: 4 MMOL/L (ref 3.6–5.5)
PROT SERPL-MCNC: 7.2 G/DL (ref 6–8.2)
SODIUM SERPL-SCNC: 140 MMOL/L (ref 135–145)

## 2018-07-20 LAB
CENTROMERE IGG TITR SER IF: 0 AU/ML (ref 0–40)
CH50 SERPL-ACNC: 123 CAE UNITS (ref 60–144)
CHROMATIN IGG SERPL-ACNC: 7 UNITS (ref 0–19)
ENA SCL70 IGG SER QL: 4 AU/ML (ref 0–40)

## 2018-07-21 LAB
DSDNA AB TITR SER CLIF: ABNORMAL {TITER}
ENA SM IGG SER-ACNC: 0 AU/ML (ref 0–40)
ENA SS-B IGG SER IA-ACNC: 2 AU/ML (ref 0–40)
NUCLEAR IGG SER QL IA: DETECTED
NUCLEAR IGG TITR SER IF: ABNORMAL {TITER}
SSA52 R0ENA AB IGG Q0420: 6 AU/ML (ref 0–40)
SSA60 R0ENA AB IGG Q0419: 0 AU/ML (ref 0–40)
U1 SNRNP IGG SER QL: 0 AU/ML (ref 0–40)

## 2018-07-25 NOTE — PROGRESS NOTES
ESTABLISHED PATIENT    Ms. Zakiya Kelly is a 62 y.o. female here for follow-up for UCTD    Undifferentiated connective tissue with pruritic rash, positive SHIRAZ 1:1280 diffuse, Raynauds  raynauds stable  No new rash  Tolerating plaquenil    Current Medications:  Plaquenil 400 mg po BID except SUndays she take 200 mg.            RHEUM HISTORY  Ms. Zakiya Kelly  was first evaluated by Dr. Ramon Eugene for possible autoimmune syndrome March 11, 2011 with a history of recurrent pruritic rash that started about Thanksgiving of 2010. At the time she relates she correlated that the onset of symptoms preceded was preceded by the use of antibiotic therapy for an upper respiratory infection. She presented with a known positive SHIRAZ that had been ongoing for 7-8 years and a sensitivity to aspirin in the form of GI symptoms. The rash brought on with the antibiotic therapy seems to be aggravated with cold weather affecting the elbows as well as the axilla, inguinal area and also diffusely involving the neck and shoulders. At her initial visit she denied any small joint arthritis photosensitivity or chest pain and 3 consistent with pleurisy or pericardial pain.        Her initial evaluation included evaluation of CRP C2 complement C3 and C4 cryoglobulins, cryofibrinogen and cold agglutinins as well as ANCA levels and a titered SHIRAZ as well as anti-TPO antibodies. The results of the lab noted in a notation on March 25, 2011 states that last from March 17, 2011 showed an negative ANCA level, SHIRAZ was 1:1280 diffuse pattern,     THyroid Peroxidase Antibody was 45 the C3 was normal at 143 and the C4 was normal at 44 and a CO2 was normal at within normal limits. CRP was slightly elevated at 6.4. Her cold agglutinins, cryoglobulins, cryofibrinogen were negative she was otherwise diagnosed with recurrent pruritic rash associated with a positive SHIRAZ consider for undifferentiated connective tissue disease and was started on  Plaquenil 200 mg by mouth daily March 25, 2011     Next follow-up note is from February 6, 2015 at the time she has noted to have Raynaud's primarily of the index finger somewhat left greater than right as well as her recurrent urticarial rash that affects the neck and the elbows. She was maintained on Plaquenil and 200 mg 2 tablets by mouth daily throughout 2015.  she was evaluated at six-month intervals and continued to have episodes of Raynaud's however peripheral pulses were full and symmetric she is maintained on Plaquenil during her care.      Her visit February 17, 2017 she had undifferentiated connective tissue disease based on a high titer SHIRAZ recurrent urticarial rash as well as Raynaud's phenomenon has been clinically quiescent on hydroxychloroquine. At her last visit in August 18, 2017  she has been quiescent she has sometimes a mild rash on her neck but generally dermatologic manifestations were quiesced and no arthritis there was noted mild mottling of the skin and the plan was to continue hydroxychloroquine and Complements checked at titer SHIRAZ a UA and a CBC CMP check.     Today the patient denies any morning stiffness or swollen joints are stiff hands or any unusual hair loss. She does admit to some photosensitivity due to her lisinopril. She also admits to Raynauds. As well as night sweats due to postmenopausal symptoms. Otherwise she has no fevers or chills or any eye redness or eye inflammation. She does have occasional headaches as well as sinus trouble and drainage in the back the throat, and also fatigue. She also admits to some nausea sometimes some vomiting sometimes and also aspirin will cause her to get ulcers. She has IBS and so will experience intermittent episodes of diarrhea.  She has a history of vertigo and has been told it is related to allergies.     She denies any chest pain or palpitations or shortness of breath. She denies any dysphagia or sore throat. She denies any oral ulcers  or cavities or sore tongue. She denies any eye pain or eye inflammation.         Pertinent Serologies  Results for JOSUE REYES (MRN [id number]) as of 7/25/2018 07:27   Ref. Range 7/18/2018 07:36   G-6-Pd Latest Ref Range: 9.9 - 16.6 U/g Hb 8.4 (L)     Results for JOSUE REYES (MRN [id number]) as of 7/25/2018 07:27   Ref. Range 7/18/2018 07:36 7/18/2018 07:37   Rheumatoid Factor -Neph- Latest Ref Range: 0 - 14 IU/mL <10    Stat C-Reactive Protein Latest Ref Range: 0.00 - 0.75 mg/dL 0.24    Anti-Dna -Ds Latest Ref Range: None Detected  None Detected None Detected   Anti-Scl-70 Latest Ref Range: 0 - 40 AU/mL 4    Anti-Centromere B Ab Latest Ref Range: 0 - 40 AU/mL 0    Chromatin Ab, IgG Latest Ref Range: 0 - 19 Units 7    Antinuclear Antibody Latest Ref Range: None Detected   Detected (A)   Rnp Antibodies Latest Ref Range: 0 - 40 AU/mL  0   Smith Antibodies Latest Ref Range: 0 - 40 AU/mL  0   SSA 52 (R0)(CLAUDY) Ab, IgG Latest Ref Range: 0 - 40 AU/mL  6   SSA 60 (R0)(CLAUDY) Ab, IgG Latest Ref Range: 0 - 40 AU/mL  0   Sjogren'S Anti-Ss-B Latest Ref Range: 0 - 40 AU/mL  2   SHIRAZ Titer Latest Ref Range: <1:80   1:2560 (H)       Past medical History:  UCTD, vertigo     Family History:  Sister had SLE, thyroid dysfunction - Hashimoto; father had bladder cancer      Social History:  Just started working at an aesthetics - , NEVER smoker, social alcohol       Current Outpatient Prescriptions on File Prior to Visit   Medication Sig Dispense Refill   • Cyanocobalamin (VITAMIN B-12 PO) Take  by mouth every day.     • Cholecalciferol (VITAMIN D) 2000 UNIT CAPS Take  by mouth every day.     • Vitamin Mixture (VLADISLAV-C PO) Take 1,000 mg by mouth every day.     • Acetaminophen (TYLENOL EXTRA STRENGTH PO) Take  by mouth as needed.     • lisinopril (PRINIVIL) 10 MG TABS Take 10 mg by mouth every day.       No current facility-administered medications on file prior to visit.        REVIEW OF SYSTEMS  Review of Systems    Constitutional: Negative for chills and fever.   HENT: Negative for tinnitus.    Respiratory: Negative for cough.    Cardiovascular: Negative for chest pain.   Musculoskeletal: Negative for back pain and joint pain.   Skin: Negative for rash.   Endo/Heme/Allergies: Does not bruise/bleed easily.       PHYSICAL EXAM   Ambulatory Vitals  Encounter Vitals  Temperature: 36.4 °C (97.6 °F)  Temp Source: Temporal  Blood Pressure: 108/70  BP Location: Left, Upper Arm  Patient BP Position: Sitting  Pulse: 60  Respiration: 16  Pulse Oximetry: 98 %  Weight: 77.7 kg (171 lb 3.2 oz)  Weight Source: Stand Up Scale      Physical Exam   Constitutional: She is well-developed, well-nourished, and in no distress. No distress.   HENT:   Head: Normocephalic and atraumatic.   Right Ear: External ear normal.   Left Ear: External ear normal.   Eyes: Conjunctivae are normal. Right eye exhibits no discharge. Left eye exhibits no discharge. No scleral icterus.   Skin: She is not diaphoretic.           DIAGNOSTICS:    Labs      No results found for: QNTTBGOLD  No results found for: HEPBCORTOT, HEPBCORIGM, HEPBSAG  No results found for: HEPCAB  Lab Results   Component Value Date/Time    SODIUM 140 07/18/2018 07:36 AM    POTASSIUM 4.0 07/18/2018 07:36 AM    CHLORIDE 103 07/18/2018 07:36 AM    CO2 26 07/18/2018 07:36 AM    GLUCOSE 73 07/18/2018 07:36 AM    BUN 21 07/18/2018 07:36 AM    CREATININE 0.98 07/18/2018 07:36 AM      Lab Results   Component Value Date/Time    WBC 5.4 07/18/2018 07:36 AM    RBC 4.31 07/18/2018 07:36 AM    HEMOGLOBIN 14.0 07/18/2018 07:36 AM    HEMATOCRIT 42.4 07/18/2018 07:36 AM    MCV 98.4 (H) 07/18/2018 07:36 AM    MCH 32.5 07/18/2018 07:36 AM    MCHC 33.0 (L) 07/18/2018 07:36 AM    MPV 10.2 07/18/2018 07:36 AM    NEUTSPOLYS 71.80 07/18/2018 07:36 AM    LYMPHOCYTES 19.50 (L) 07/18/2018 07:36 AM    MONOCYTES 7.20 07/18/2018 07:36 AM    EOSINOPHILS 0.60 07/18/2018 07:36 AM    BASOPHILS 0.70 07/18/2018 07:36 AM      Lab  Results   Component Value Date/Time    CALCIUM 9.6 07/18/2018 07:36 AM    ASTSGOT 18 07/18/2018 07:36 AM    ALTSGPT 18 07/18/2018 07:36 AM    ALKPHOSPHAT 60 07/18/2018 07:36 AM    TBILIRUBIN 0.7 07/18/2018 07:36 AM    ALBUMIN 4.5 07/18/2018 07:36 AM    TOTPROTEIN 7.2 07/18/2018 07:36 AM     Lab Results   Component Value Date/Time    RHEUMFACTN <10 07/18/2018 07:36 AM    ANTINUCAB Detected (A) 07/18/2018 07:37 AM     Lab Results   Component Value Date/Time    SEDRATEWES 10 07/18/2018 07:36 AM    CREACTPROT 0.24 07/18/2018 07:36 AM     No results found for: RUSSELVIPER, DRVVTINTERP  Lab Results   Component Value Date/Time    U4CFCICLDJA 134.0 07/18/2018 07:36 AM    J1ANZEOPBCD 39.0 07/18/2018 07:36 AM     Lab Results   Component Value Date/Time    ANTIDNADS None Detected 07/18/2018 07:37 AM    RNPAB 0 07/18/2018 07:37 AM    SMITHAB 0 07/18/2018 07:37 AM    VZOUSFO66 4 07/18/2018 07:36 AM    CENTROMBAB 0 07/18/2018 07:36 AM     Lab Results   Component Value Date/Time    ANTIDNADS None Detected 07/18/2018 07:37 AM    DSDNA <1:10 08/25/2011 02:02 PM    [id number] 134.0 07/18/2018 07:36 AM    RNPAB 0 07/18/2018 07:37 AM    ANTISSASJ 2 08/25/2011 02:02 PM    ANTISSBSJ 2 07/18/2018 07:37 AM     Lab Results   Component Value Date/Time    COLORURINE Yellow 02/12/2018 06:09 AM    SPECGRAVITY 1.005 02/12/2018 06:09 AM    PHURINE 7.0 02/12/2018 06:09 AM    GLUCOSEUR Negative 02/12/2018 06:09 AM    KETONES Negative 02/12/2018 06:09 AM    PROTEINURIN Negative 02/12/2018 06:09 AM     No results found for: TOTPROTUR, TOTALVOLUME, [id number]  Lab Results   Component Value Date/Time    SSA60 0 07/18/2018 07:37 AM    SSA52 6 07/18/2018 07:37 AM     No results found for: HBA1C  No results found for: CPKTOTAL  Lab Results   Component Value Date/Time    G6PD 8.4 (L) 07/18/2018 07:36 AM     No results found for: [id number]  No results found for: ACESERUM  No results found for: 25HYDROXY  No results found for: TSH, FREEDIR  Lab Results    Component Value Date/Time    TSHULTYAAEN 1.680 07/18/2018 07:31 AM     No results found for: MICROSOMALA, ANTITHYROGL  No results found for: IGGLYMEABS  No results found for: ANTIMITOCHO, FACTIN  Lab Results   Component Value Date/Time     08/15/2012 12:21 PM    TTRANSIGA 8 08/15/2012 12:21 PM     No results found for: FLTYPE, CRYSTALSBDF  No results found for: ISTATICAL  No results found for: ISTATCREAT  No results found for: CTELOPEP  No results found for: GBMABG  No results found for: PTHINTACT        Imaging          ASSESSMENT AND PLAN:  Ms. Zakiya Kelly presents for follow-up of undifferentiated connective tissue disease with rash and raynauds  Thus far stable to no new symptoms.  I reviewed her labs with her today  I noted the low G6PD enzyme.  She has tolerated plaquenil and not developed hemolytic anemia  We will recheck G6PD enzyme and lower the dose to 200 mg and 400 mg po alternating days  I will see her again in 3 months            1. G6PD deficiency (HCC)  G6PD QUANT + RBC   2. Long-term use of Plaquenil  CBC WITH DIFFERENTIAL    COMP METABOLIC PANEL    WESTERGREN SED RATE    CRP QUANTITIVE (NON-CARDIAC)   3. Positive SHIRAZ (antinuclear antibody)       Return in about 11 weeks (around 10/11/2018).      I have spent greater than 50% of this 30 minute visit in counseling/coordination of care with the patient regarding review of her labs and plans to change therapy.      she agreed and verbalized she agreement and understanding with the current plan. I answered all questions and concerns she has at this time and advised our patient to call at any time in there interim with questions or concerns in regards she care.     Thank you for allowing me to participate in the care of this patient, I will continue to follow closely.      Please note that this dictation was created using voice recognition software. I have made every reasonable attempt to correct obvious errors, but I expect that there  are errors of grammar and possibly content that I did not discover before finalizing the note.

## 2018-07-26 ENCOUNTER — OFFICE VISIT (OUTPATIENT)
Dept: RHEUMATOLOGY | Facility: PHYSICIAN GROUP | Age: 63
End: 2018-07-26
Payer: COMMERCIAL

## 2018-07-26 VITALS
RESPIRATION RATE: 16 BRPM | HEART RATE: 60 BPM | BODY MASS INDEX: 30.81 KG/M2 | DIASTOLIC BLOOD PRESSURE: 70 MMHG | OXYGEN SATURATION: 98 % | WEIGHT: 171.2 LBS | SYSTOLIC BLOOD PRESSURE: 108 MMHG | TEMPERATURE: 97.6 F

## 2018-07-26 DIAGNOSIS — R76.8 POSITIVE ANA (ANTINUCLEAR ANTIBODY): ICD-10-CM

## 2018-07-26 DIAGNOSIS — Z79.899 LONG-TERM USE OF PLAQUENIL: ICD-10-CM

## 2018-07-26 DIAGNOSIS — D75.A G6PD DEFICIENCY: ICD-10-CM

## 2018-07-26 PROCEDURE — 99214 OFFICE O/P EST MOD 30 MIN: CPT | Performed by: INTERNAL MEDICINE

## 2018-07-26 RX ORDER — HYDROXYCHLOROQUINE SULFATE 200 MG/1
TABLET, FILM COATED ORAL
Qty: 45 TAB | Refills: 0 | Status: SHIPPED | OUTPATIENT
Start: 2018-07-26 | End: 2018-08-16

## 2018-07-26 RX ORDER — THIAMINE HCL 100 MG
TABLET ORAL
COMMUNITY
End: 2020-09-21

## 2018-07-26 ASSESSMENT — ENCOUNTER SYMPTOMS
COUGH: 0
BRUISES/BLEEDS EASILY: 0
BACK PAIN: 0
CHILLS: 0
FEVER: 0

## 2018-07-26 NOTE — LETTER
[facility]   [address], [address]  [place] [address]  Phone: [phone]  Fax: [phone]              Encounter Date: 7/26/2018    Dear Dr. Herzog,    It was a pleasure seeing your patient, Zakiya Kelly, on 7/26/2018. Diagnoses of G6PD deficiency (HCC), Long-term use of Plaquenil, and Positive SHIRAZ (antinuclear antibody) were pertinent to this visit.     Please find attached progress note which includes the history I obtained from Ms. Kelly, my physical examination findings, my impression and recommendations.      Once again, it was a pleasure participating in your patient's care.  Please feel free to contact me if you have any questions or if I can be of any further assistance to your patients.      Sincerely,    Angélica Packer M.D.  Electronically Signed          PROGRESS NOTE:  ESTABLISHED PATIENT    Ms. Zakiya Kelly is a 62 y.o. female here for follow-up for UCTD    Undifferentiated connective tissue with pruritic rash, positive SHIRAZ 1:1280 diffuse, Raynauds  raynauds stable  No new rash  Tolerating plaquenil    Current Medications:  Plaquenil 400 mg po BID except SUndays she take 200 mg.            RHEUM HISTORY  Ms. Zakiya Kelly  was first evaluated by Dr. Ramon Eugene for possible autoimmune syndrome March 11, 2011 with a history of recurrent pruritic rash that started about Thanksgiving of 2010. At the time she relates she correlated that the onset of symptoms preceded was preceded by the use of antibiotic therapy for an upper respiratory infection. She presented with a known positive SHIRAZ that had been ongoing for 7-8 years and a sensitivity to aspirin in the form of GI symptoms. The rash brought on with the antibiotic therapy seems to be aggravated with cold weather affecting the elbows as well as the axilla, inguinal area and also diffusely involving the neck and shoulders. At her initial visit she denied any small joint arthritis photosensitivity or chest  pain and 3 consistent with pleurisy or pericardial pain.        Her initial evaluation included evaluation of CRP C2 complement C3 and C4 cryoglobulins, cryofibrinogen and cold agglutinins as well as ANCA levels and a titered SHIRAZ as well as anti-TPO antibodies. The results of the lab noted in a notation on March 25, 2011 states that last from March 17, 2011 showed an negative ANCA level, SHIRAZ was 1:1280 diffuse pattern,     THyroid Peroxidase Antibody was 45 the C3 was normal at 143 and the C4 was normal at 44 and a CO2 was normal at within normal limits. CRP was slightly elevated at 6.4. Her cold agglutinins, cryoglobulins, cryofibrinogen were negative she was otherwise diagnosed with recurrent pruritic rash associated with a positive SHIRAZ consider for undifferentiated connective tissue disease and was started on Plaquenil 200 mg by mouth daily March 25, 2011     Next follow-up note is from February 6, 2015 at the time she has noted to have Raynaud's primarily of the index finger somewhat left greater than right as well as her recurrent urticarial rash that affects the neck and the elbows. She was maintained on Plaquenil and 200 mg 2 tablets by mouth daily throughout 2015.  she was evaluated at six-month intervals and continued to have episodes of Raynaud's however peripheral pulses were full and symmetric she is maintained on Plaquenil during her care.      Her visit February 17, 2017 she had undifferentiated connective tissue disease based on a high titer SHIRAZ recurrent urticarial rash as well as Raynaud's phenomenon has been clinically quiescent on hydroxychloroquine. At her last visit in August 18, 2017  she has been quiescent she has sometimes a mild rash on her neck but generally dermatologic manifestations were quiesced and no arthritis there was noted mild mottling of the skin and the plan was to continue hydroxychloroquine and Complements checked at titer SHIRAZ a UA and a CBC CMP check.     Today the patient  denies any morning stiffness or swollen joints are stiff hands or any unusual hair loss. She does admit to some photosensitivity due to her lisinopril. She also admits to Raynauds. As well as night sweats due to postmenopausal symptoms. Otherwise she has no fevers or chills or any eye redness or eye inflammation. She does have occasional headaches as well as sinus trouble and drainage in the back the throat, and also fatigue. She also admits to some nausea sometimes some vomiting sometimes and also aspirin will cause her to get ulcers. She has IBS and so will experience intermittent episodes of diarrhea.  She has a history of vertigo and has been told it is related to allergies.     She denies any chest pain or palpitations or shortness of breath. She denies any dysphagia or sore throat. She denies any oral ulcers or cavities or sore tongue. She denies any eye pain or eye inflammation.         Pertinent Serologies  Results for JOSUE REYES (MRN [id number]) as of 7/25/2018 07:27   Ref. Range 7/18/2018 07:36   G-6-Pd Latest Ref Range: 9.9 - 16.6 U/g Hb 8.4 (L)     Results for JOSUE REYES (MRN [id number]) as of 7/25/2018 07:27   Ref. Range 7/18/2018 07:36 7/18/2018 07:37   Rheumatoid Factor -Neph- Latest Ref Range: 0 - 14 IU/mL <10    Stat C-Reactive Protein Latest Ref Range: 0.00 - 0.75 mg/dL 0.24    Anti-Dna -Ds Latest Ref Range: None Detected  None Detected None Detected   Anti-Scl-70 Latest Ref Range: 0 - 40 AU/mL 4    Anti-Centromere B Ab Latest Ref Range: 0 - 40 AU/mL 0    Chromatin Ab, IgG Latest Ref Range: 0 - 19 Units 7    Antinuclear Antibody Latest Ref Range: None Detected   Detected (A)   Rnp Antibodies Latest Ref Range: 0 - 40 AU/mL  0   Smith Antibodies Latest Ref Range: 0 - 40 AU/mL  0   SSA 52 (R0)(CLAUDY) Ab, IgG Latest Ref Range: 0 - 40 AU/mL  6   SSA 60 (R0)(CLAUDY) Ab, IgG Latest Ref Range: 0 - 40 AU/mL  0   Sjogren'S Anti-Ss-B Latest Ref Range: 0 - 40 AU/mL  2   SHIRAZ Titer Latest Ref Range:  <1:80   1:2560 (H)       Past medical History:  UCTD, vertigo     Family History:  Sister had SLE, thyroid dysfunction - Hashimoto; father had bladder cancer      Social History:  Just started working at an aesthetics - , NEVER smoker, social alcohol       Current Outpatient Prescriptions on File Prior to Visit   Medication Sig Dispense Refill   • Cyanocobalamin (VITAMIN B-12 PO) Take  by mouth every day.     • Cholecalciferol (VITAMIN D) 2000 UNIT CAPS Take  by mouth every day.     • Vitamin Mixture (VLADISLAV-C PO) Take 1,000 mg by mouth every day.     • Acetaminophen (TYLENOL EXTRA STRENGTH PO) Take  by mouth as needed.     • lisinopril (PRINIVIL) 10 MG TABS Take 10 mg by mouth every day.       No current facility-administered medications on file prior to visit.        REVIEW OF SYSTEMS  Review of Systems   Constitutional: Negative for chills and fever.   HENT: Negative for tinnitus.    Respiratory: Negative for cough.    Cardiovascular: Negative for chest pain.   Musculoskeletal: Negative for back pain and joint pain.   Skin: Negative for rash.   Endo/Heme/Allergies: Does not bruise/bleed easily.       PHYSICAL EXAM   Ambulatory Vitals  Encounter Vitals  Temperature: 36.4 °C (97.6 °F)  Temp Source: Temporal  Blood Pressure: 108/70  BP Location: Left, Upper Arm  Patient BP Position: Sitting  Pulse: 60  Respiration: 16  Pulse Oximetry: 98 %  Weight: 77.7 kg (171 lb 3.2 oz)  Weight Source: Stand Up Scale      Physical Exam   Constitutional: She is well-developed, well-nourished, and in no distress. No distress.   HENT:   Head: Normocephalic and atraumatic.   Right Ear: External ear normal.   Left Ear: External ear normal.   Eyes: Conjunctivae are normal. Right eye exhibits no discharge. Left eye exhibits no discharge. No scleral icterus.   Skin: She is not diaphoretic.           DIAGNOSTICS:    Labs      No results found for: QNTTBGOLD  No results found for: HEPBCORTOT, HEPBCORIGM, HEPBSAG  No results found  for: HEPCAB  Lab Results   Component Value Date/Time    SODIUM 140 07/18/2018 07:36 AM    POTASSIUM 4.0 07/18/2018 07:36 AM    CHLORIDE 103 07/18/2018 07:36 AM    CO2 26 07/18/2018 07:36 AM    GLUCOSE 73 07/18/2018 07:36 AM    BUN 21 07/18/2018 07:36 AM    CREATININE 0.98 07/18/2018 07:36 AM      Lab Results   Component Value Date/Time    WBC 5.4 07/18/2018 07:36 AM    RBC 4.31 07/18/2018 07:36 AM    HEMOGLOBIN 14.0 07/18/2018 07:36 AM    HEMATOCRIT 42.4 07/18/2018 07:36 AM    MCV 98.4 (H) 07/18/2018 07:36 AM    MCH 32.5 07/18/2018 07:36 AM    MCHC 33.0 (L) 07/18/2018 07:36 AM    MPV 10.2 07/18/2018 07:36 AM    NEUTSPOLYS 71.80 07/18/2018 07:36 AM    LYMPHOCYTES 19.50 (L) 07/18/2018 07:36 AM    MONOCYTES 7.20 07/18/2018 07:36 AM    EOSINOPHILS 0.60 07/18/2018 07:36 AM    BASOPHILS 0.70 07/18/2018 07:36 AM      Lab Results   Component Value Date/Time    CALCIUM 9.6 07/18/2018 07:36 AM    ASTSGOT 18 07/18/2018 07:36 AM    ALTSGPT 18 07/18/2018 07:36 AM    ALKPHOSPHAT 60 07/18/2018 07:36 AM    TBILIRUBIN 0.7 07/18/2018 07:36 AM    ALBUMIN 4.5 07/18/2018 07:36 AM    TOTPROTEIN 7.2 07/18/2018 07:36 AM     Lab Results   Component Value Date/Time    RHEUMFACTN <10 07/18/2018 07:36 AM    ANTINUCAB Detected (A) 07/18/2018 07:37 AM     Lab Results   Component Value Date/Time    SEDRATEWES 10 07/18/2018 07:36 AM    CREACTPROT 0.24 07/18/2018 07:36 AM     No results found for: RUSSELVIPER, DRVVTINTERP  Lab Results   Component Value Date/Time    F3NSBEGCNZP 134.0 07/18/2018 07:36 AM    Y6UOOPAFAHI 39.0 07/18/2018 07:36 AM     Lab Results   Component Value Date/Time    ANTIDNADS None Detected 07/18/2018 07:37 AM    RNPAB 0 07/18/2018 07:37 AM    SMITHAB 0 07/18/2018 07:37 AM    [id number] 4 07/18/2018 07:36 AM    CENTROMBAB 0 07/18/2018 07:36 AM     Lab Results   Component Value Date/Time    ANTIDNADS None Detected 07/18/2018 07:37 AM    DSDNA <1:10 08/25/2011 02:02 PM    W0ZZLVSPQQN 134.0 07/18/2018 07:36 AM    RNPAB 0 07/18/2018  07:37 AM    ANTISSASJ 2 08/25/2011 02:02 PM    ANTISSBSJ 2 07/18/2018 07:37 AM     Lab Results   Component Value Date/Time    COLORURINE Yellow 02/12/2018 06:09 AM    SPECGRAVITY 1.005 02/12/2018 06:09 AM    PHURINE 7.0 02/12/2018 06:09 AM    GLUCOSEUR Negative 02/12/2018 06:09 AM    KETONES Negative 02/12/2018 06:09 AM    PROTEINURIN Negative 02/12/2018 06:09 AM     No results found for: TOTPROTUR, TOTALVOLUME, [id number]  Lab Results   Component Value Date/Time    SSA60 0 07/18/2018 07:37 AM    SSA52 6 07/18/2018 07:37 AM     No results found for: HBA1C  No results found for: CPKTOTAL  Lab Results   Component Value Date/Time    G6PD 8.4 (L) 07/18/2018 07:36 AM     No results found for: [id number]  No results found for: ACESERUM  No results found for: 25HYDROXY  No results found for: TSH, FREEDIR  Lab Results   Component Value Date/Time    TSHULTRASEN 1.680 07/18/2018 07:31 AM     No results found for: MICROSOMALA, ANTITHYROGL  No results found for: IGGLYMEABS  No results found for: ANTIMITOCHO, FACTIN  Lab Results   Component Value Date/Time     08/15/2012 12:21 PM    TTRANSIGA 8 08/15/2012 12:21 PM     No results found for: FLTYPE, CRYSTALSBDF  No results found for: ISTATICAL  No results found for: ISTATCREAT  No results found for: CTELOPEP  No results found for: GBMABG  No results found for: PTHINTACT        Imaging          ASSESSMENT AND PLAN:  Ms. Zakiya Kelly presents for follow-up of undifferentiated connective tissue disease with rash and raynauds  Thus far stable to no new symptoms.  I reviewed her labs with her today  I noted the low G6PD enzyme.  She has tolerated plaquenil and not developed hemolytic anemia  We will recheck G6PD enzyme and lower the dose to 200 mg and 400 mg po alternating days  I will see her again in 3 months            1. G6PD deficiency (HCC)  G6PD QUANT + RBC   2. Long-term use of Plaquenil  CBC WITH DIFFERENTIAL    COMP METABOLIC PANEL    WESTERGREN SED RATE    CRP  QUANTITIVE (NON-CARDIAC)   3. Positive SHIRAZ (antinuclear antibody)       Return in about 11 weeks (around 10/11/2018).      I have spent greater than 50% of this 30 minute visit in counseling/coordination of care with the patient regarding review of her labs and plans to change therapy.      she agreed and verbalized she agreement and understanding with the current plan. I answered all questions and concerns she has at this time and advised our patient to call at any time in there interim with questions or concerns in regards she care.     Thank you for allowing me to participate in the care of this patient, I will continue to follow closely.      Please note that this dictation was created using voice recognition software. I have made every reasonable attempt to correct obvious errors, but I expect that there are errors of grammar and possibly content that I did not discover before finalizing the note.

## 2018-08-16 ENCOUNTER — APPOINTMENT (RX ONLY)
Dept: URBAN - METROPOLITAN AREA CLINIC 4 | Facility: CLINIC | Age: 63
Setting detail: DERMATOLOGY
End: 2018-08-16

## 2018-08-16 DIAGNOSIS — L82.0 INFLAMED SEBORRHEIC KERATOSIS: ICD-10-CM

## 2018-08-16 DIAGNOSIS — L73.8 OTHER SPECIFIED FOLLICULAR DISORDERS: ICD-10-CM

## 2018-08-16 DIAGNOSIS — L72.8 OTHER FOLLICULAR CYSTS OF THE SKIN AND SUBCUTANEOUS TISSUE: ICD-10-CM

## 2018-08-16 PROBLEM — L57.0 ACTINIC KERATOSIS: Status: ACTIVE | Noted: 2018-08-16

## 2018-08-16 PROBLEM — I10 ESSENTIAL (PRIMARY) HYPERTENSION: Status: ACTIVE | Noted: 2018-08-16

## 2018-08-16 PROCEDURE — ? COUNSELING

## 2018-08-16 PROCEDURE — 99213 OFFICE O/P EST LOW 20 MIN: CPT

## 2018-08-16 PROCEDURE — ? ADDITIONAL NOTES

## 2018-08-16 ASSESSMENT — LOCATION DETAILED DESCRIPTION DERM
LOCATION DETAILED: LEFT INFERIOR MEDIAL FOREHEAD
LOCATION DETAILED: LEFT CLAVICULAR NECK
LOCATION DETAILED: RIGHT MEDIAL MALAR CHEEK
LOCATION DETAILED: LEFT MEDIAL FOREHEAD
LOCATION DETAILED: RIGHT INFERIOR MEDIAL MALAR CHEEK
LOCATION DETAILED: RIGHT LATERAL SUPERIOR CHEST
LOCATION DETAILED: INFERIOR MID FOREHEAD
LOCATION DETAILED: LEFT CLAVICULAR SKIN
LOCATION DETAILED: RIGHT CLAVICULAR SKIN

## 2018-08-16 ASSESSMENT — LOCATION ZONE DERM
LOCATION ZONE: FACE
LOCATION ZONE: NECK
LOCATION ZONE: TRUNK

## 2018-08-16 ASSESSMENT — LOCATION SIMPLE DESCRIPTION DERM
LOCATION SIMPLE: CHEST
LOCATION SIMPLE: RIGHT CLAVICULAR SKIN
LOCATION SIMPLE: LEFT CLAVICULAR SKIN
LOCATION SIMPLE: INFERIOR FOREHEAD
LOCATION SIMPLE: LEFT FOREHEAD
LOCATION SIMPLE: RIGHT CHEEK
LOCATION SIMPLE: LEFT ANTERIOR NECK

## 2018-09-25 ENCOUNTER — HOSPITAL ENCOUNTER (OUTPATIENT)
Dept: LAB | Facility: MEDICAL CENTER | Age: 63
End: 2018-09-25
Attending: FAMILY MEDICINE
Payer: COMMERCIAL

## 2018-09-25 ENCOUNTER — HOSPITAL ENCOUNTER (OUTPATIENT)
Dept: LAB | Facility: MEDICAL CENTER | Age: 63
End: 2018-09-25
Attending: INTERNAL MEDICINE
Payer: COMMERCIAL

## 2018-09-25 DIAGNOSIS — Z79.899 LONG-TERM USE OF PLAQUENIL: ICD-10-CM

## 2018-09-25 LAB
ALBUMIN SERPL BCP-MCNC: 4.2 G/DL (ref 3.2–4.9)
ALBUMIN/GLOB SERPL: 1.4 G/DL
ALP SERPL-CCNC: 54 U/L (ref 30–99)
ALT SERPL-CCNC: 15 U/L (ref 2–50)
ANION GAP SERPL CALC-SCNC: 10 MMOL/L (ref 0–11.9)
ANION GAP SERPL CALC-SCNC: 7 MMOL/L (ref 0–11.9)
AST SERPL-CCNC: 17 U/L (ref 12–45)
BASOPHILS # BLD AUTO: 0.4 % (ref 0–1.8)
BASOPHILS # BLD AUTO: 0.6 % (ref 0–1.8)
BASOPHILS # BLD: 0.02 K/UL (ref 0–0.12)
BASOPHILS # BLD: 0.03 K/UL (ref 0–0.12)
BILIRUB SERPL-MCNC: 0.4 MG/DL (ref 0.1–1.5)
BUN SERPL-MCNC: 23 MG/DL (ref 8–22)
BUN SERPL-MCNC: 24 MG/DL (ref 8–22)
CALCIUM SERPL-MCNC: 10 MG/DL (ref 8.5–10.5)
CALCIUM SERPL-MCNC: 10.1 MG/DL (ref 8.5–10.5)
CHLORIDE SERPL-SCNC: 102 MMOL/L (ref 96–112)
CHLORIDE SERPL-SCNC: 103 MMOL/L (ref 96–112)
CO2 SERPL-SCNC: 28 MMOL/L (ref 20–33)
CO2 SERPL-SCNC: 29 MMOL/L (ref 20–33)
CREAT SERPL-MCNC: 0.94 MG/DL (ref 0.5–1.4)
CREAT SERPL-MCNC: 0.94 MG/DL (ref 0.5–1.4)
CRP SERPL HS-MCNC: 0.39 MG/DL (ref 0–0.75)
EOSINOPHIL # BLD AUTO: 0.05 K/UL (ref 0–0.51)
EOSINOPHIL # BLD AUTO: 0.05 K/UL (ref 0–0.51)
EOSINOPHIL NFR BLD: 1 % (ref 0–6.9)
EOSINOPHIL NFR BLD: 1.1 % (ref 0–6.9)
ERYTHROCYTE [DISTWIDTH] IN BLOOD BY AUTOMATED COUNT: 44.1 FL (ref 35.9–50)
ERYTHROCYTE [DISTWIDTH] IN BLOOD BY AUTOMATED COUNT: 44.4 FL (ref 35.9–50)
ERYTHROCYTE [SEDIMENTATION RATE] IN BLOOD BY WESTERGREN METHOD: 7 MM/HOUR (ref 0–30)
GLOBULIN SER CALC-MCNC: 3 G/DL (ref 1.9–3.5)
GLUCOSE SERPL-MCNC: 75 MG/DL (ref 65–99)
GLUCOSE SERPL-MCNC: 77 MG/DL (ref 65–99)
HCT VFR BLD AUTO: 43 % (ref 37–47)
HCT VFR BLD AUTO: 43.3 % (ref 37–47)
HGB BLD-MCNC: 14.2 G/DL (ref 12–16)
HGB BLD-MCNC: 14.3 G/DL (ref 12–16)
IMM GRANULOCYTES # BLD AUTO: 0 K/UL (ref 0–0.11)
IMM GRANULOCYTES # BLD AUTO: 0.01 K/UL (ref 0–0.11)
IMM GRANULOCYTES NFR BLD AUTO: 0 % (ref 0–0.9)
IMM GRANULOCYTES NFR BLD AUTO: 0.2 % (ref 0–0.9)
LYMPHOCYTES # BLD AUTO: 0.96 K/UL (ref 1–4.8)
LYMPHOCYTES # BLD AUTO: 1.01 K/UL (ref 1–4.8)
LYMPHOCYTES NFR BLD: 19.2 % (ref 22–41)
LYMPHOCYTES NFR BLD: 20.2 % (ref 22–41)
MCH RBC QN AUTO: 32.6 PG (ref 27–33)
MCH RBC QN AUTO: 33 PG (ref 27–33)
MCHC RBC AUTO-ENTMCNC: 32.8 G/DL (ref 33.6–35)
MCHC RBC AUTO-ENTMCNC: 33.3 G/DL (ref 33.6–35)
MCV RBC AUTO: 99.3 FL (ref 81.4–97.8)
MCV RBC AUTO: 99.3 FL (ref 81.4–97.8)
MONOCYTES # BLD AUTO: 0.23 K/UL (ref 0–0.85)
MONOCYTES # BLD AUTO: 0.31 K/UL (ref 0–0.85)
MONOCYTES NFR BLD AUTO: 4.8 % (ref 0–13.4)
MONOCYTES NFR BLD AUTO: 5.9 % (ref 0–13.4)
NEUTROPHILS # BLD AUTO: 3.5 K/UL (ref 2–7.15)
NEUTROPHILS # BLD AUTO: 3.85 K/UL (ref 2–7.15)
NEUTROPHILS NFR BLD: 73.1 % (ref 44–72)
NEUTROPHILS NFR BLD: 73.5 % (ref 44–72)
NRBC # BLD AUTO: 0 K/UL
NRBC # BLD AUTO: 0 K/UL
NRBC BLD-RTO: 0 /100 WBC
NRBC BLD-RTO: 0 /100 WBC
PLATELET # BLD AUTO: 218 K/UL (ref 164–446)
PLATELET # BLD AUTO: 227 K/UL (ref 164–446)
PMV BLD AUTO: 10.2 FL (ref 9–12.9)
PMV BLD AUTO: 10.3 FL (ref 9–12.9)
POTASSIUM SERPL-SCNC: 4.4 MMOL/L (ref 3.6–5.5)
POTASSIUM SERPL-SCNC: 4.5 MMOL/L (ref 3.6–5.5)
PROT SERPL-MCNC: 7.2 G/DL (ref 6–8.2)
RBC # BLD AUTO: 4.33 M/UL (ref 4.2–5.4)
RBC # BLD AUTO: 4.36 M/UL (ref 4.2–5.4)
SODIUM SERPL-SCNC: 139 MMOL/L (ref 135–145)
SODIUM SERPL-SCNC: 140 MMOL/L (ref 135–145)
WBC # BLD AUTO: 4.8 K/UL (ref 4.8–10.8)
WBC # BLD AUTO: 5.3 K/UL (ref 4.8–10.8)

## 2018-09-25 PROCEDURE — 80053 COMPREHEN METABOLIC PANEL: CPT

## 2018-09-25 PROCEDURE — 85025 COMPLETE CBC W/AUTO DIFF WBC: CPT

## 2018-09-25 PROCEDURE — 36415 COLL VENOUS BLD VENIPUNCTURE: CPT

## 2018-09-25 PROCEDURE — 86140 C-REACTIVE PROTEIN: CPT

## 2018-09-25 PROCEDURE — 85652 RBC SED RATE AUTOMATED: CPT

## 2018-09-25 PROCEDURE — 85025 COMPLETE CBC W/AUTO DIFF WBC: CPT | Mod: 91

## 2018-09-25 PROCEDURE — 80048 BASIC METABOLIC PNL TOTAL CA: CPT

## 2018-10-09 ENCOUNTER — OFFICE VISIT (OUTPATIENT)
Dept: RHEUMATOLOGY | Facility: MEDICAL CENTER | Age: 63
End: 2018-10-09
Payer: COMMERCIAL

## 2018-10-09 VITALS
OXYGEN SATURATION: 99 % | DIASTOLIC BLOOD PRESSURE: 70 MMHG | BODY MASS INDEX: 32.07 KG/M2 | TEMPERATURE: 97.7 F | HEART RATE: 65 BPM | SYSTOLIC BLOOD PRESSURE: 120 MMHG | WEIGHT: 178.2 LBS

## 2018-10-09 DIAGNOSIS — I73.00 RAYNAUD'S DISEASE WITHOUT GANGRENE: ICD-10-CM

## 2018-10-09 DIAGNOSIS — M35.9 UNDIFFERENTIATED CONNECTIVE TISSUE DISEASE (HCC): ICD-10-CM

## 2018-10-09 PROCEDURE — 99213 OFFICE O/P EST LOW 20 MIN: CPT | Performed by: INTERNAL MEDICINE

## 2018-10-09 RX ORDER — FLUTICASONE PROPIONATE 50 MCG
1 SPRAY, SUSPENSION (ML) NASAL DAILY
COMMUNITY

## 2018-10-09 ASSESSMENT — ENCOUNTER SYMPTOMS
BACK PAIN: 0
BRUISES/BLEEDS EASILY: 0
COUGH: 0
CHILLS: 0
FEVER: 0

## 2018-10-09 NOTE — PROGRESS NOTES
ESTABLISHED PATIENT    Ms. Zakiya Kelly is a 62 y.o. female here for follow-up for UCTD    Undifferentiated connective tissue with pruritic rash, positive SHIRAZ 1:1280 diffuse, Raynauds  Went down to plaquenil 200 mg po q day  She is not breatking out.  Doing well no morning stiffness  raynauds stable  No new rash  Tolerating plaquenil    Current Medications:  Plaquenil 400 mg po BID except SUndays she take 200 mg.            RHEUM HISTORY  Ms. Zakiya Kelly  was first evaluated by Dr. Ramon Eugene for possible autoimmune syndrome March 11, 2011 with a history of recurrent pruritic rash that started about Thanksgiving of 2010. At the time she relates she correlated that the onset of symptoms preceded was preceded by the use of antibiotic therapy for an upper respiratory infection. She presented with a known positive SHIRAZ that had been ongoing for 7-8 years and a sensitivity to aspirin in the form of GI symptoms. The rash brought on with the antibiotic therapy seems to be aggravated with cold weather affecting the elbows as well as the axilla, inguinal area and also diffusely involving the neck and shoulders. At her initial visit she denied any small joint arthritis photosensitivity or chest pain and 3 consistent with pleurisy or pericardial pain.        Her initial evaluation included evaluation of CRP C2 complement C3 and C4 cryoglobulins, cryofibrinogen and cold agglutinins as well as ANCA levels and a titered SHIRAZ as well as anti-TPO antibodies. The results of the lab noted in a notation on March 25, 2011 states that last from March 17, 2011 showed an negative ANCA level, SHIRAZ was 1:1280 diffuse pattern,     THyroid Peroxidase Antibody was 45 the C3 was normal at 143 and the C4 was normal at 44 and a CO2 was normal at within normal limits. CRP was slightly elevated at 6.4. Her cold agglutinins, cryoglobulins, cryofibrinogen were negative she was otherwise diagnosed with recurrent pruritic rash associated  with a positive SHIRAZ consider for undifferentiated connective tissue disease and was started on Plaquenil 200 mg by mouth daily March 25, 2011     Next follow-up note is from February 6, 2015 at the time she has noted to have Raynaud's primarily of the index finger somewhat left greater than right as well as her recurrent urticarial rash that affects the neck and the elbows. She was maintained on Plaquenil and 200 mg 2 tablets by mouth daily throughout 2015.  she was evaluated at six-month intervals and continued to have episodes of Raynaud's however peripheral pulses were full and symmetric she is maintained on Plaquenil during her care.      Her visit February 17, 2017 she had undifferentiated connective tissue disease based on a high titer SHIRAZ recurrent urticarial rash as well as Raynaud's phenomenon has been clinically quiescent on hydroxychloroquine. At her last visit in August 18, 2017  she has been quiescent she has sometimes a mild rash on her neck but generally dermatologic manifestations were quiesced and no arthritis there was noted mild mottling of the skin and the plan was to continue hydroxychloroquine and Complements checked at titer SHIRAZ a UA and a CBC CMP check.     Today the patient denies any morning stiffness or swollen joints are stiff hands or any unusual hair loss. She does admit to some photosensitivity due to her lisinopril. She also admits to Raynauds. As well as night sweats due to postmenopausal symptoms. Otherwise she has no fevers or chills or any eye redness or eye inflammation. She does have occasional headaches as well as sinus trouble and drainage in the back the throat, and also fatigue. She also admits to some nausea sometimes some vomiting sometimes and also aspirin will cause her to get ulcers. She has IBS and so will experience intermittent episodes of diarrhea.  She has a history of vertigo and has been told it is related to allergies.     She denies any chest pain or  palpitations or shortness of breath. She denies any dysphagia or sore throat. She denies any oral ulcers or cavities or sore tongue. She denies any eye pain or eye inflammation.         Pertinent Serologies  Results for JOSUE REYES (MRN [id number]) as of 7/25/2018 07:27   Ref. Range 7/18/2018 07:36   G-6-Pd Latest Ref Range: 9.9 - 16.6 U/g Hb 8.4 (L)     Results for JOSUE REYES (MRN [id number]) as of 7/25/2018 07:27   Ref. Range 7/18/2018 07:36 7/18/2018 07:37   Rheumatoid Factor -Neph- Latest Ref Range: 0 - 14 IU/mL <10    Stat C-Reactive Protein Latest Ref Range: 0.00 - 0.75 mg/dL 0.24    Anti-Dna -Ds Latest Ref Range: None Detected  None Detected None Detected   Anti-Scl-70 Latest Ref Range: 0 - 40 AU/mL 4    Anti-Centromere B Ab Latest Ref Range: 0 - 40 AU/mL 0    Chromatin Ab, IgG Latest Ref Range: 0 - 19 Units 7    Antinuclear Antibody Latest Ref Range: None Detected   Detected (A)   Rnp Antibodies Latest Ref Range: 0 - 40 AU/mL  0   Smith Antibodies Latest Ref Range: 0 - 40 AU/mL  0   SSA 52 (R0)(CLAUDY) Ab, IgG Latest Ref Range: 0 - 40 AU/mL  6   SSA 60 (R0)(CLAUDY) Ab, IgG Latest Ref Range: 0 - 40 AU/mL  0   Sjogren'S Anti-Ss-B Latest Ref Range: 0 - 40 AU/mL  2   SHIRAZ Titer Latest Ref Range: <1:80   1:2560 (H)       Past medical History:  UCTD, vertigo     Family History:  Sister had SLE, thyroid dysfunction - Hashimoto; father had bladder cancer      Social History:  Just started working at an aesthetics - , NEVER smoker, social alcohol       Current Outpatient Prescriptions on File Prior to Visit   Medication Sig Dispense Refill   • hydroxychloroquine (PLAQUENIL) 200 MG Tab TAKE 2 TABLETS BY MOUTH EVERY DAY EXCEPT FOR SUNDAY TAKE 1 AND 1/2 TABLETS 60 Tab 0   • TURMERIC PO Take  by mouth.     • Calcium-Magnesium-Vitamin D (CALCIUM 1200+D3 PO) Take  by mouth.     • Cyanocobalamin (VITAMIN B-12 PO) Take  by mouth every day.     • Cholecalciferol (VITAMIN D) 2000 UNIT CAPS Take  by mouth every  day.     • Vitamin Mixture (VLADISLAV-C PO) Take 1,000 mg by mouth every day.     • Acetaminophen (TYLENOL EXTRA STRENGTH PO) Take  by mouth as needed.     • lisinopril (PRINIVIL) 10 MG TABS Take 10 mg by mouth every day.     • Magnesium 500 MG Tab Take  by mouth.       No current facility-administered medications on file prior to visit.        REVIEW OF SYSTEMS  Review of Systems   Constitutional: Negative for chills, fever and malaise/fatigue.   HENT: Negative for tinnitus.    Respiratory: Negative for cough.    Cardiovascular: Negative for chest pain.   Musculoskeletal: Negative for back pain and joint pain.   Skin: Negative for rash.   Endo/Heme/Allergies: Does not bruise/bleed easily.       PHYSICAL EXAM     Vitals:    10/09/18 0724   BP: 120/70   BP Location: Left arm   Patient Position: Sitting   BP Cuff Size: Adult   Pulse: 65   Temp: 36.5 °C (97.7 °F)   TempSrc: Temporal   SpO2: 99%   Weight: 80.8 kg (178 lb 3.2 oz)           Physical Exam   Constitutional: She is oriented to person, place, and time and well-developed, well-nourished, and in no distress. No distress.   HENT:   Head: Normocephalic and atraumatic.   Right Ear: External ear normal.   Left Ear: External ear normal.   Eyes: Conjunctivae are normal. Right eye exhibits no discharge. Left eye exhibits no discharge. No scleral icterus.   Cardiovascular: Normal rate, regular rhythm and normal heart sounds.    Pulmonary/Chest: Effort normal and breath sounds normal. No stridor. No respiratory distress. She has no wheezes. She has no rales.   Neurological: She is alert and oriented to person, place, and time. Gait normal. GCS score is 15.   No active synovitis of the MCP or PIP or wrists bilateral.  No knee swelling bilateral   Skin: Skin is warm and dry. She is not diaphoretic.   Limited exam   Psychiatric: Memory, affect and judgment normal.   Vitals reviewed.          DIAGNOSTICS:    Labs      No results found for: QNTTBGOLD  No results found for:  HEPBCORTOT, HEPBCORIGM, HEPBSAG  No results found for: HEPCAB  Lab Results   Component Value Date/Time    SODIUM 139 09/25/2018 08:13 AM    POTASSIUM 4.5 09/25/2018 08:13 AM    CHLORIDE 103 09/25/2018 08:13 AM    CO2 29 09/25/2018 08:13 AM    GLUCOSE 77 09/25/2018 08:13 AM    BUN 24 (H) 09/25/2018 08:13 AM    CREATININE 0.94 09/25/2018 08:13 AM      Lab Results   Component Value Date/Time    WBC 4.8 09/25/2018 08:13 AM    RBC 4.36 09/25/2018 08:13 AM    HEMOGLOBIN 14.2 09/25/2018 08:13 AM    HEMATOCRIT 43.3 09/25/2018 08:13 AM    MCV 99.3 (H) 09/25/2018 08:13 AM    MCH 32.6 09/25/2018 08:13 AM    MCHC 32.8 (L) 09/25/2018 08:13 AM    MPV 10.2 09/25/2018 08:13 AM    NEUTSPOLYS 73.50 (H) 09/25/2018 08:13 AM    LYMPHOCYTES 20.20 (L) 09/25/2018 08:13 AM    MONOCYTES 4.80 09/25/2018 08:13 AM    EOSINOPHILS 1.10 09/25/2018 08:13 AM    BASOPHILS 0.40 09/25/2018 08:13 AM      Lab Results   Component Value Date/Time    CALCIUM 10.0 09/25/2018 08:13 AM    ASTSGOT 17 09/25/2018 08:13 AM    ALTSGPT 15 09/25/2018 08:13 AM    ALKPHOSPHAT 54 09/25/2018 08:13 AM    TBILIRUBIN 0.4 09/25/2018 08:13 AM    ALBUMIN 4.2 09/25/2018 08:13 AM    TOTPROTEIN 7.2 09/25/2018 08:13 AM     Lab Results   Component Value Date/Time    RHEUMFACTN <10 07/18/2018 07:36 AM    ANTINUCAB Detected (A) 07/18/2018 07:37 AM     Lab Results   Component Value Date/Time    SEDRATEWES 7 09/25/2018 08:13 AM    CREACTPROT 0.39 09/25/2018 08:13 AM     No results found for: RUSSELVIPER, DRVVTINTERP  Lab Results   Component Value Date/Time    K4APJPRECIO 134.0 07/18/2018 07:36 AM    [id number] 39.0 07/18/2018 07:36 AM     Lab Results   Component Value Date/Time    ANTIDNADS None Detected 07/18/2018 07:37 AM    RNPAB 0 07/18/2018 07:37 AM    SMITHAB 0 07/18/2018 07:37 AM    AMRBDOP19 4 07/18/2018 07:36 AM    CENTROMBAB 0 07/18/2018 07:36 AM     Lab Results   Component Value Date/Time    ANTIDNADS None Detected 07/18/2018 07:37 AM    DSDNA <1:10 08/25/2011 02:02 PM     [id number] 134.0 07/18/2018 07:36 AM    RNPAB 0 07/18/2018 07:37 AM    ANTISSASJ 2 08/25/2011 02:02 PM    ANTISSBSJ 2 07/18/2018 07:37 AM     Lab Results   Component Value Date/Time    COLORURINE Yellow 02/12/2018 06:09 AM    SPECGRAVITY 1.005 02/12/2018 06:09 AM    PHURINE 7.0 02/12/2018 06:09 AM    GLUCOSEUR Negative 02/12/2018 06:09 AM    KETONES Negative 02/12/2018 06:09 AM    PROTEINURIN Negative 02/12/2018 06:09 AM     No results found for: TOTPROTUR, TOTALVOLUME, [id number]  Lab Results   Component Value Date/Time    SSA60 0 07/18/2018 07:37 AM    SSA52 6 07/18/2018 07:37 AM     No results found for: HBA1C  No results found for: CPKTOTAL  Lab Results   Component Value Date/Time    G6PD 8.4 (L) 07/18/2018 07:36 AM     No results found for: GYDL91CRUP  No results found for: ACESERUM  No results found for: 25HYDROXY  No results found for: TSH, FREEDIR  Lab Results   Component Value Date/Time    TSHULTRASEN 1.680 07/18/2018 07:31 AM     No results found for: MICROSOMALA, ANTITHYROGL  No results found for: IGGLYMEABS  No results found for: ANTIMITOCHO, FACTIN  Lab Results   Component Value Date/Time     08/15/2012 12:21 PM    TTRANSIGA 8 08/15/2012 12:21 PM     No results found for: FLTYPE, CRYSTALSBDF  No results found for: ISTATICAL  No results found for: ISTATCREAT  No results found for: CTELOPEP  No results found for: GBMABG  No results found for: PTHINTACT        Imaging          ASSESSMENT AND PLAN:  Ms. Zakiya Kelly presents for follow-up of undifferentiated connective tissue disease with rash and raynauds  Thus far stable to no new symptoms.  I reviewed her labs with her today  Again I noted the low G6PD enzyme.  She has tolerated plaquenil and not developed hemolytic anemia    We will evaluate her at 5 months      Raynauds  Relatively stable  If needed we could consider amlodipine in the winter  For now conservative management      1. Undifferentiated connective tissue disease (HCC)   COMPLEMENT C3    COMPLEMENT C4    CBC WITH DIFFERENTIAL    BUN    HEPATIC FUNCTION PANEL    WESTERGREN SED RATE    CRP QUANTITIVE (NON-CARDIAC)    CREATININE    DSDNA AB, IGG W/RFLX TO IFA TITER    COMPLEMENT TOTAL (CH50)   2. Raynaud's disease without gangrene       Return in about 5 months (around 3/9/2019).      I have spent greater than 50% of this 30 minute visit in counseling/coordination of care with the patient regarding review of her labs and plans to change therapy.      she agreed and verbalized she agreement and understanding with the current plan. I answered all questions and concerns she has at this time and advised our patient to call at any time in there interim with questions or concerns in regards she care.     Thank you for allowing me to participate in the care of this patient, I will continue to follow closely.      Please note that this dictation was created using voice recognition software. I have made every reasonable attempt to correct obvious errors, but I expect that there are errors of grammar and possibly content that I did not discover before finalizing the note.

## 2018-10-09 NOTE — LETTER
[facility]   [address] [place] [address], [address]  ALIYA Silva [address]  Phone: [phone]  Fax: [phone]              Encounter Date: 10/9/2018    Dear Dr. Herzog,    It was a pleasure seeing your patient, Zakiya Kelly, on 10/9/2018. Diagnoses of Undifferentiated connective tissue disease (HCC) and Raynaud's disease without gangrene were pertinent to this visit.     Please find attached progress note which includes the history I obtained from Ms. Kelly, my physical examination findings, my impression and recommendations.      Once again, it was a pleasure participating in your patient's care.  Please feel free to contact me if you have any questions or if I can be of any further assistance to your patients.      Sincerely,    Angélica Packer M.D.  Electronically Signed          PROGRESS NOTE:  ESTABLISHED PATIENT    Ms. Zakiya Kelly is a 62 y.o. female here for follow-up for UCTD    Undifferentiated connective tissue with pruritic rash, positive SHIRAZ 1:1280 diffuse, Raynauds  Went down to plaquenil 200 mg po q day  She is not breatking out.  Doing well no morning stiffness  raynauds stable  No new rash  Tolerating plaquenil    Current Medications:  Plaquenil 400 mg po BID except SUndays she take 200 mg.            RHEUM HISTORY  Ms. Zakiya Kelly  was first evaluated by Dr. Ramon Eugene for possible autoimmune syndrome March 11, 2011 with a history of recurrent pruritic rash that started about Thanksgiving of 2010. At the time she relates she correlated that the onset of symptoms preceded was preceded by the use of antibiotic therapy for an upper respiratory infection. She presented with a known positive SHIRAZ that had been ongoing for 7-8 years and a sensitivity to aspirin in the form of GI symptoms. The rash brought on with the antibiotic therapy seems to be aggravated with cold weather affecting the elbows as well as the axilla, inguinal area and also diffusely involving the neck  and shoulders. At her initial visit she denied any small joint arthritis photosensitivity or chest pain and 3 consistent with pleurisy or pericardial pain.        Her initial evaluation included evaluation of CRP C2 complement C3 and C4 cryoglobulins, cryofibrinogen and cold agglutinins as well as ANCA levels and a titered SHIRAZ as well as anti-TPO antibodies. The results of the lab noted in a notation on March 25, 2011 states that last from March 17, 2011 showed an negative ANCA level, SHIRAZ was 1:1280 diffuse pattern,     THyroid Peroxidase Antibody was 45 the C3 was normal at 143 and the C4 was normal at 44 and a CO2 was normal at within normal limits. CRP was slightly elevated at 6.4. Her cold agglutinins, cryoglobulins, cryofibrinogen were negative she was otherwise diagnosed with recurrent pruritic rash associated with a positive SHIRAZ consider for undifferentiated connective tissue disease and was started on Plaquenil 200 mg by mouth daily March 25, 2011     Next follow-up note is from February 6, 2015 at the time she has noted to have Raynaud's primarily of the index finger somewhat left greater than right as well as her recurrent urticarial rash that affects the neck and the elbows. She was maintained on Plaquenil and 200 mg 2 tablets by mouth daily throughout 2015.  she was evaluated at six-month intervals and continued to have episodes of Raynaud's however peripheral pulses were full and symmetric she is maintained on Plaquenil during her care.      Her visit February 17, 2017 she had undifferentiated connective tissue disease based on a high titer SHIRAZ recurrent urticarial rash as well as Raynaud's phenomenon has been clinically quiescent on hydroxychloroquine. At her last visit in August 18, 2017  she has been quiescent she has sometimes a mild rash on her neck but generally dermatologic manifestations were quiesced and no arthritis there was noted mild mottling of the skin and the plan was to continue  hydroxychloroquine and Complements checked at titer SHIRAZ a UA and a CBC CMP check.     Today the patient denies any morning stiffness or swollen joints are stiff hands or any unusual hair loss. She does admit to some photosensitivity due to her lisinopril. She also admits to Raynauds. As well as night sweats due to postmenopausal symptoms. Otherwise she has no fevers or chills or any eye redness or eye inflammation. She does have occasional headaches as well as sinus trouble and drainage in the back the throat, and also fatigue. She also admits to some nausea sometimes some vomiting sometimes and also aspirin will cause her to get ulcers. She has IBS and so will experience intermittent episodes of diarrhea.  She has a history of vertigo and has been told it is related to allergies.     She denies any chest pain or palpitations or shortness of breath. She denies any dysphagia or sore throat. She denies any oral ulcers or cavities or sore tongue. She denies any eye pain or eye inflammation.         Pertinent Serologies  Results for JOSUE REYES (MRN [id number]) as of 7/25/2018 07:27   Ref. Range 7/18/2018 07:36   G-6-Pd Latest Ref Range: 9.9 - 16.6 U/g Hb 8.4 (L)     Results for JOSUE REYES (MRN [id number]) as of 7/25/2018 07:27   Ref. Range 7/18/2018 07:36 7/18/2018 07:37   Rheumatoid Factor -Neph- Latest Ref Range: 0 - 14 IU/mL <10    Stat C-Reactive Protein Latest Ref Range: 0.00 - 0.75 mg/dL 0.24    Anti-Dna -Ds Latest Ref Range: None Detected  None Detected None Detected   Anti-Scl-70 Latest Ref Range: 0 - 40 AU/mL 4    Anti-Centromere B Ab Latest Ref Range: 0 - 40 AU/mL 0    Chromatin Ab, IgG Latest Ref Range: 0 - 19 Units 7    Antinuclear Antibody Latest Ref Range: None Detected   Detected (A)   Rnp Antibodies Latest Ref Range: 0 - 40 AU/mL  0   Smith Antibodies Latest Ref Range: 0 - 40 AU/mL  0   SSA 52 (R0)(CLAUDY) Ab, IgG Latest Ref Range: 0 - 40 AU/mL  6   SSA 60 (R0)(CLAUDY) Ab, IgG Latest Ref Range: 0  - 40 AU/mL  0   Sjogren'S Anti-Ss-B Latest Ref Range: 0 - 40 AU/mL  2   SHIRAZ Titer Latest Ref Range: <1:80   1:2560 (H)       Past medical History:  UCTD, vertigo     Family History:  Sister had SLE, thyroid dysfunction - Hashimoto; father had bladder cancer      Social History:  Just started working at an aesthetics - , NEVER smoker, social alcohol       Current Outpatient Prescriptions on File Prior to Visit   Medication Sig Dispense Refill   • hydroxychloroquine (PLAQUENIL) 200 MG Tab TAKE 2 TABLETS BY MOUTH EVERY DAY EXCEPT FOR SUNDAY TAKE 1 AND 1/2 TABLETS 60 Tab 0   • TURMERIC PO Take  by mouth.     • Calcium-Magnesium-Vitamin D (CALCIUM 1200+D3 PO) Take  by mouth.     • Cyanocobalamin (VITAMIN B-12 PO) Take  by mouth every day.     • Cholecalciferol (VITAMIN D) 2000 UNIT CAPS Take  by mouth every day.     • Vitamin Mixture (VLADISLAV-C PO) Take 1,000 mg by mouth every day.     • Acetaminophen (TYLENOL EXTRA STRENGTH PO) Take  by mouth as needed.     • lisinopril (PRINIVIL) 10 MG TABS Take 10 mg by mouth every day.     • Magnesium 500 MG Tab Take  by mouth.       No current facility-administered medications on file prior to visit.        REVIEW OF SYSTEMS  Review of Systems   Constitutional: Negative for chills, fever and malaise/fatigue.   HENT: Negative for tinnitus.    Respiratory: Negative for cough.    Cardiovascular: Negative for chest pain.   Musculoskeletal: Negative for back pain and joint pain.   Skin: Negative for rash.   Endo/Heme/Allergies: Does not bruise/bleed easily.       PHYSICAL EXAM     Vitals:    10/09/18 0724   BP: 120/70   BP Location: Left arm   Patient Position: Sitting   BP Cuff Size: Adult   Pulse: 65   Temp: 36.5 °C (97.7 °F)   TempSrc: Temporal   SpO2: 99%   Weight: 80.8 kg (178 lb 3.2 oz)           Physical Exam   Constitutional: She is oriented to person, place, and time and well-developed, well-nourished, and in no distress. No distress.   HENT:   Head: Normocephalic and  atraumatic.   Right Ear: External ear normal.   Left Ear: External ear normal.   Eyes: Conjunctivae are normal. Right eye exhibits no discharge. Left eye exhibits no discharge. No scleral icterus.   Cardiovascular: Normal rate, regular rhythm and normal heart sounds.    Pulmonary/Chest: Effort normal and breath sounds normal. No stridor. No respiratory distress. She has no wheezes. She has no rales.   Neurological: She is alert and oriented to person, place, and time. Gait normal. GCS score is 15.   No active synovitis of the MCP or PIP or wrists bilateral.  No knee swelling bilateral   Skin: Skin is warm and dry. She is not diaphoretic.   Limited exam   Psychiatric: Memory, affect and judgment normal.   Vitals reviewed.          DIAGNOSTICS:    Labs      No results found for: QNTTBGOLD  No results found for: HEPBCORTOT, HEPBCORIGM, HEPBSAG  No results found for: HEPCAB  Lab Results   Component Value Date/Time    SODIUM 139 09/25/2018 08:13 AM    POTASSIUM 4.5 09/25/2018 08:13 AM    CHLORIDE 103 09/25/2018 08:13 AM    CO2 29 09/25/2018 08:13 AM    GLUCOSE 77 09/25/2018 08:13 AM    BUN 24 (H) 09/25/2018 08:13 AM    CREATININE 0.94 09/25/2018 08:13 AM      Lab Results   Component Value Date/Time    WBC 4.8 09/25/2018 08:13 AM    RBC 4.36 09/25/2018 08:13 AM    HEMOGLOBIN 14.2 09/25/2018 08:13 AM    HEMATOCRIT 43.3 09/25/2018 08:13 AM    MCV 99.3 (H) 09/25/2018 08:13 AM    MCH 32.6 09/25/2018 08:13 AM    MCHC 32.8 (L) 09/25/2018 08:13 AM    MPV 10.2 09/25/2018 08:13 AM    NEUTSPOLYS 73.50 (H) 09/25/2018 08:13 AM    LYMPHOCYTES 20.20 (L) 09/25/2018 08:13 AM    MONOCYTES 4.80 09/25/2018 08:13 AM    EOSINOPHILS 1.10 09/25/2018 08:13 AM    BASOPHILS 0.40 09/25/2018 08:13 AM      Lab Results   Component Value Date/Time    CALCIUM 10.0 09/25/2018 08:13 AM    ASTSGOT 17 09/25/2018 08:13 AM    ALTSGPT 15 09/25/2018 08:13 AM    ALKPHOSPHAT 54 09/25/2018 08:13 AM    TBILIRUBIN 0.4 09/25/2018 08:13 AM    ALBUMIN 4.2 09/25/2018  08:13 AM    TOTPROTEIN 7.2 09/25/2018 08:13 AM     Lab Results   Component Value Date/Time    RHEUMFACTN <10 07/18/2018 07:36 AM    ANTINUCAB Detected (A) 07/18/2018 07:37 AM     Lab Results   Component Value Date/Time    SEDRATEWES 7 09/25/2018 08:13 AM    CREACTPROT 0.39 09/25/2018 08:13 AM     No results found for: RUSSELVIPER, DRVVTINTERP  Lab Results   Component Value Date/Time    K0ZYXJHRCLE 134.0 07/18/2018 07:36 AM    P4OVHSFNSNU 39.0 07/18/2018 07:36 AM     Lab Results   Component Value Date/Time    ANTIDNADS None Detected 07/18/2018 07:37 AM    RNPAB 0 07/18/2018 07:37 AM    SMITHAB 0 07/18/2018 07:37 AM    VTKSHOP61 4 07/18/2018 07:36 AM    CENTROMBAB 0 07/18/2018 07:36 AM     Lab Results   Component Value Date/Time    ANTIDNADS None Detected 07/18/2018 07:37 AM    DSDNA <1:10 08/25/2011 02:02 PM    A0TOJVIWDHY 134.0 07/18/2018 07:36 AM    RNPAB 0 07/18/2018 07:37 AM    ANTISSASJ 2 08/25/2011 02:02 PM    ANTISSBSJ 2 07/18/2018 07:37 AM     Lab Results   Component Value Date/Time    COLORURINE Yellow 02/12/2018 06:09 AM    SPECGRAVITY 1.005 02/12/2018 06:09 AM    PHURINE 7.0 02/12/2018 06:09 AM    GLUCOSEUR Negative 02/12/2018 06:09 AM    KETONES Negative 02/12/2018 06:09 AM    PROTEINURIN Negative 02/12/2018 06:09 AM     No results found for: TOTPROTUR, TOTALVOLUME, [id number]  Lab Results   Component Value Date/Time    SSA60 0 07/18/2018 07:37 AM    SSA52 6 07/18/2018 07:37 AM     No results found for: HBA1C  No results found for: CPKTOTAL  Lab Results   Component Value Date/Time    G6PD 8.4 (L) 07/18/2018 07:36 AM     No results found for: DOYK56OONX  No results found for: ACESERUM  No results found for: 25HYDROXY  No results found for: TSH, FREEDIR  Lab Results   Component Value Date/Time    TSHULTRASEN 1.680 07/18/2018 07:31 AM     No results found for: MICROSOMALA, ANTITHYROGL  No results found for: IGGLYMEABS  No results found for: ANTIMITOCHO, FACTIN  Lab Results   Component Value Date/Time    IGA  208 08/15/2012 12:21 PM    TTRANSIGA 8 08/15/2012 12:21 PM     No results found for: FLTYPE, CRYSTALSBDF  No results found for: ISTATICAL  No results found for: ISTATCREAT  No results found for: CTELOPEP  No results found for: GBMABG  No results found for: PTHINTACT        Imaging          ASSESSMENT AND PLAN:  Ms. Zakiya Kelly presents for follow-up of undifferentiated connective tissue disease with rash and raynauds  Thus far stable to no new symptoms.  I reviewed her labs with her today  Again I noted the low G6PD enzyme.  She has tolerated plaquenil and not developed hemolytic anemia    We will evaluate her at 5 months      Raynauds  Relatively stable  If needed we could consider amlodipine in the winter  For now conservative management      1. Undifferentiated connective tissue disease (HCC)  COMPLEMENT C3    COMPLEMENT C4    CBC WITH DIFFERENTIAL    BUN    HEPATIC FUNCTION PANEL    WESTERGREN SED RATE    CRP QUANTITIVE (NON-CARDIAC)    CREATININE    DSDNA AB, IGG W/RFLX TO IFA TITER    COMPLEMENT TOTAL (CH50)   2. Raynaud's disease without gangrene       Return in about 5 months (around 3/9/2019).      I have spent greater than 50% of this 30 minute visit in counseling/coordination of care with the patient regarding review of her labs and plans to change therapy.      she agreed and verbalized she agreement and understanding with the current plan. I answered all questions and concerns she has at this time and advised our patient to call at any time in there interim with questions or concerns in regards she care.     Thank you for allowing me to participate in the care of this patient, I will continue to follow closely.      Please note that this dictation was created using voice recognition software. I have made every reasonable attempt to correct obvious errors, but I expect that there are errors of grammar and possibly content that I did not discover before finalizing the note.

## 2018-11-15 DIAGNOSIS — Z79.899 LONG-TERM USE OF PLAQUENIL: ICD-10-CM

## 2018-11-15 RX ORDER — HYDROXYCHLOROQUINE SULFATE 200 MG/1
TABLET, FILM COATED ORAL
Qty: 180 TAB | Refills: 0 | Status: SHIPPED | OUTPATIENT
Start: 2018-11-15 | End: 2018-12-10 | Stop reason: SDUPTHER

## 2018-11-15 NOTE — TELEPHONE ENCOUNTER
Was the patient seen in the last year in this department? Yes  Sept labs  Does patient have an active prescription for medications requested? No     Received Request Via: Pharmacy

## 2018-11-16 ENCOUNTER — HOSPITAL ENCOUNTER (OUTPATIENT)
Dept: RADIOLOGY | Facility: MEDICAL CENTER | Age: 63
End: 2018-11-16
Attending: FAMILY MEDICINE
Payer: COMMERCIAL

## 2018-11-16 DIAGNOSIS — Z12.39 SCREENING BREAST EXAMINATION: ICD-10-CM

## 2018-11-16 PROCEDURE — 77067 SCR MAMMO BI INCL CAD: CPT

## 2018-11-30 ENCOUNTER — HOSPITAL ENCOUNTER (OUTPATIENT)
Dept: RADIOLOGY | Facility: MEDICAL CENTER | Age: 63
End: 2018-11-30
Attending: FAMILY MEDICINE
Payer: COMMERCIAL

## 2018-11-30 DIAGNOSIS — N95.8 POSTMENOPAUSAL RELATED MOOD DISORDER: ICD-10-CM

## 2018-11-30 DIAGNOSIS — M85.80 OSTEOPENIA, UNSPECIFIED LOCATION: ICD-10-CM

## 2018-11-30 DIAGNOSIS — F06.30 POSTMENOPAUSAL RELATED MOOD DISORDER: ICD-10-CM

## 2018-11-30 PROCEDURE — 77080 DXA BONE DENSITY AXIAL: CPT

## 2018-12-10 DIAGNOSIS — Z79.899 LONG-TERM USE OF PLAQUENIL: ICD-10-CM

## 2018-12-10 RX ORDER — HYDROXYCHLOROQUINE SULFATE 200 MG/1
TABLET, FILM COATED ORAL
Qty: 180 TAB | Refills: 0 | Status: SHIPPED | OUTPATIENT
Start: 2018-12-10 | End: 2019-03-28 | Stop reason: SDUPTHER

## 2019-01-15 ENCOUNTER — HOSPITAL ENCOUNTER (OUTPATIENT)
Dept: RADIOLOGY | Facility: MEDICAL CENTER | Age: 64
End: 2019-01-15
Attending: FAMILY MEDICINE
Payer: COMMERCIAL

## 2019-01-15 DIAGNOSIS — M25.561 RIGHT KNEE PAIN, UNSPECIFIED CHRONICITY: ICD-10-CM

## 2019-01-15 PROCEDURE — 73562 X-RAY EXAM OF KNEE 3: CPT | Mod: RT

## 2019-03-19 ENCOUNTER — HOSPITAL ENCOUNTER (OUTPATIENT)
Dept: LAB | Facility: MEDICAL CENTER | Age: 64
End: 2019-03-19
Attending: INTERNAL MEDICINE
Payer: COMMERCIAL

## 2019-03-19 DIAGNOSIS — M35.9 UNDIFFERENTIATED CONNECTIVE TISSUE DISEASE (HCC): ICD-10-CM

## 2019-03-19 DIAGNOSIS — D75.A G6PD DEFICIENCY: ICD-10-CM

## 2019-03-19 LAB
ALBUMIN SERPL BCP-MCNC: 4.4 G/DL (ref 3.2–4.9)
ALP SERPL-CCNC: 59 U/L (ref 30–99)
ALT SERPL-CCNC: 14 U/L (ref 2–50)
AST SERPL-CCNC: 16 U/L (ref 12–45)
BASOPHILS # BLD AUTO: 0.8 % (ref 0–1.8)
BASOPHILS # BLD: 0.05 K/UL (ref 0–0.12)
BILIRUB CONJ SERPL-MCNC: <0.1 MG/DL (ref 0.1–0.5)
BILIRUB INDIRECT SERPL-MCNC: NORMAL MG/DL (ref 0–1)
BILIRUB SERPL-MCNC: 0.3 MG/DL (ref 0.1–1.5)
BUN SERPL-MCNC: 31 MG/DL (ref 8–22)
C3 SERPL-MCNC: 120 MG/DL (ref 87–200)
C4 SERPL-MCNC: 36 MG/DL (ref 19–52)
CREAT SERPL-MCNC: 1 MG/DL (ref 0.5–1.4)
CRP SERPL HS-MCNC: 0.47 MG/DL (ref 0–0.75)
EOSINOPHIL # BLD AUTO: 0.04 K/UL (ref 0–0.51)
EOSINOPHIL NFR BLD: 0.6 % (ref 0–6.9)
ERYTHROCYTE [DISTWIDTH] IN BLOOD BY AUTOMATED COUNT: 46.3 FL (ref 35.9–50)
ERYTHROCYTE [SEDIMENTATION RATE] IN BLOOD BY WESTERGREN METHOD: 10 MM/HOUR (ref 0–30)
HCT VFR BLD AUTO: 42.1 % (ref 37–47)
HGB BLD-MCNC: 13.8 G/DL (ref 12–16)
IMM GRANULOCYTES # BLD AUTO: 0.01 K/UL (ref 0–0.11)
IMM GRANULOCYTES NFR BLD AUTO: 0.2 % (ref 0–0.9)
LYMPHOCYTES # BLD AUTO: 1.19 K/UL (ref 1–4.8)
LYMPHOCYTES NFR BLD: 19.2 % (ref 22–41)
MCH RBC QN AUTO: 32.2 PG (ref 27–33)
MCHC RBC AUTO-ENTMCNC: 32.8 G/DL (ref 33.6–35)
MCV RBC AUTO: 98.4 FL (ref 81.4–97.8)
MONOCYTES # BLD AUTO: 0.41 K/UL (ref 0–0.85)
MONOCYTES NFR BLD AUTO: 6.6 % (ref 0–13.4)
NEUTROPHILS # BLD AUTO: 4.49 K/UL (ref 2–7.15)
NEUTROPHILS NFR BLD: 72.6 % (ref 44–72)
NRBC # BLD AUTO: 0 K/UL
NRBC BLD-RTO: 0 /100 WBC
PLATELET # BLD AUTO: 213 K/UL (ref 164–446)
PMV BLD AUTO: 10.2 FL (ref 9–12.9)
PROT SERPL-MCNC: 7.2 G/DL (ref 6–8.2)
RBC # BLD AUTO: 4.28 M/UL (ref 4.2–5.4)
WBC # BLD AUTO: 6.2 K/UL (ref 4.8–10.8)

## 2019-03-19 PROCEDURE — 84520 ASSAY OF UREA NITROGEN: CPT

## 2019-03-19 PROCEDURE — 85652 RBC SED RATE AUTOMATED: CPT

## 2019-03-19 PROCEDURE — 82565 ASSAY OF CREATININE: CPT

## 2019-03-19 PROCEDURE — 80076 HEPATIC FUNCTION PANEL: CPT

## 2019-03-19 PROCEDURE — 86225 DNA ANTIBODY NATIVE: CPT

## 2019-03-19 PROCEDURE — 86140 C-REACTIVE PROTEIN: CPT

## 2019-03-19 PROCEDURE — 82955 ASSAY OF G6PD ENZYME: CPT

## 2019-03-19 PROCEDURE — 86162 COMPLEMENT TOTAL (CH50): CPT

## 2019-03-19 PROCEDURE — 85025 COMPLETE CBC W/AUTO DIFF WBC: CPT

## 2019-03-19 PROCEDURE — 36415 COLL VENOUS BLD VENIPUNCTURE: CPT

## 2019-03-19 PROCEDURE — 86160 COMPLEMENT ANTIGEN: CPT

## 2019-03-21 LAB
DSDNA AB TITR SER CLIF: NORMAL {TITER}
G6PD RBC-CCNC: 11.2 U/G HB (ref 9.9–16.6)

## 2019-03-22 LAB — CH50 SERPL-ACNC: 102 CAE UNITS (ref 60–144)

## 2019-03-28 ENCOUNTER — OFFICE VISIT (OUTPATIENT)
Dept: RHEUMATOLOGY | Facility: MEDICAL CENTER | Age: 64
End: 2019-03-28
Payer: COMMERCIAL

## 2019-03-28 VITALS
OXYGEN SATURATION: 97 % | DIASTOLIC BLOOD PRESSURE: 76 MMHG | HEIGHT: 64 IN | BODY MASS INDEX: 30.6 KG/M2 | SYSTOLIC BLOOD PRESSURE: 124 MMHG | HEART RATE: 74 BPM | WEIGHT: 179.23 LBS | TEMPERATURE: 98.6 F

## 2019-03-28 DIAGNOSIS — I73.00 RAYNAUD'S DISEASE WITHOUT GANGRENE: ICD-10-CM

## 2019-03-28 DIAGNOSIS — Z79.899 LONG-TERM USE OF PLAQUENIL: ICD-10-CM

## 2019-03-28 DIAGNOSIS — M35.9 UNDIFFERENTIATED CONNECTIVE TISSUE DISEASE (HCC): Primary | ICD-10-CM

## 2019-03-28 PROCEDURE — 99214 OFFICE O/P EST MOD 30 MIN: CPT | Performed by: INTERNAL MEDICINE

## 2019-03-28 RX ORDER — ALPRAZOLAM 0.25 MG/1
TABLET ORAL
Refills: 0 | COMMUNITY
Start: 2019-03-25

## 2019-03-28 RX ORDER — HYDROXYCHLOROQUINE SULFATE 200 MG/1
200 TABLET, FILM COATED ORAL DAILY
Qty: 90 TAB | Refills: 1 | Status: SHIPPED | OUTPATIENT
Start: 2019-03-28 | End: 2019-08-07 | Stop reason: SDUPTHER

## 2019-03-28 NOTE — PROGRESS NOTES
RHEUMATOLOGY CLINIC FOLLOW UP NOTE        Chief complaint: follow up UCTD        HPI:  62 y/o F presents today for follow up of UCTD,  she is a new patient to me, previously followed by Dr. Packer, last seen 10/2018.    She remains on Plaquenil 200mg daily. She has had no return of the rash.  No oral ulcers, chest pain SOB, persistent cough, fevers, or unexplained weight loss.  She continues to experience Raynaud's however reports she really has had minimal issues with it this winter.  Has some right knee pain she started having after starting yoga, otherwise no joint pain or swelling.           Rheum Background:  Per Dr. Packer's notes:  Ms. Zakiya Kelly  was first evaluated by Dr. Ramon Eugene for possible autoimmune syndrome March 11, 2011 with a history of recurrent pruritic rash that started about Thanksgiving of 2010. At the time she relates she correlated that the onset of symptoms preceded was preceded by the use of antibiotic therapy for an upper respiratory infection. She presented with a known positive SHIRAZ that had been ongoing for 7-8 years and a sensitivity to aspirin in the form of GI symptoms. The rash brought on with the antibiotic therapy seems to be aggravated with cold weather affecting the elbows as well as the axilla, inguinal area and also diffusely involving the neck and shoulders. At her initial visit she denied any small joint arthritis photosensitivity or chest pain and 3 consistent with pleurisy or pericardial pain.     The results of the lab noted in a notation on March 25, 2011 states that last from March 17, 2011 showed an negative ANCA level, SHIRAZ was 1:1280 diffuse pattern,     THyroid Peroxidase Antibody was 45 the C3 was normal at 143 and the C4 was normal at 44 and a CO2 was normal at within normal limits. CRP was slightly elevated at 6.4. Her cold agglutinins, cryoglobulins, cryofibrinogen were negative she was otherwise diagnosed with recurrent pruritic rash associated with a  positive SHIRAZ consider for undifferentiated connective tissue disease and was started on Plaquenil 200 mg by mouth daily March 25, 2011     Next follow-up note is from February 6, 2015 at the time she has noted to have Raynaud's primarily of the index finger somewhat left greater than right as well as her recurrent urticarial rash that affects the neck and the elbows. She was maintained on Plaquenil and 200 mg 2 tablets by mouth daily throughout 2015.  she was evaluated at six-month intervals and continued to have episodes of Raynaud's however peripheral pulses were full and symmetric she is maintained on Plaquenil during her care.       Her visit February 17, 2017 she had undifferentiated connective tissue disease based on a high titer SHIRAZ recurrent urticarial rash as well as Raynaud's phenomenon has been clinically quiescent on hydroxychloroquine. At her last visit in August 18, 2017  she has been quiescent she has sometimes a mild rash on her neck but generally dermatologic manifestations were quiesced and no arthritis there was noted mild mottling of the skin and the plan was to continue hydroxychloroquine and Complements checked at titer SHIRAZ a UA and a CBC CMP check.    Labs:  SHIRAZ + 1:2560, speckled  RF negaitve  dsDNA negative  SCL-70 negative  ceontromere negative  Chromatin negative  SM, RNP negative  SSA, SSB negative    G6PD low       Past medical History:  UCTD, vertigo     Family History:  Sister had SLE, thyroid dysfunction - Hashimoto; father had bladder cancer      Social History:  Just started working at an aesthetics - , NEVER smoker, social alcohol         ROS:    GEN: denies fevers, night sweats,weight loss or weight gain  CV:  no palpitations, no chest dyscomfort  Pulm: no dyspnea, no cough  Skin: no rashes currently  MS: no back pain, no joint swelling            OUTPATIENT MEDS:    Prior to Admission medications    Medication Sig Start Date End Date Taking? Authorizing Provider  "  hydroxychloroquine (PLAQUENIL) 200 MG Tab TAKE 2 TABLETS BY MOUTH EVERY DAY EXCEPT FOR SUNDAY TAKE 1 AND 1/2 TABLETS 12/10/18   Teena Ford M.D.   tretinoin (RETIN-A) 0.025 % cream APPLY A PEA SIZED AMOUNT TO FACE TOPICALLY ONCE A DAY AT BEDTIME.IF DRY,THEN USE EVERY OTHER NIGHT 7/20/18   Physician Outpatient   fluticasone (FLONASE) 50 MCG/ACT nasal spray Spray 1 Spray in nose every day.    Physician Outpatient   NON SPECIFIED     Physician Outpatient   TURMERIC PO Take  by mouth.    Physician Outpatient   Magnesium 500 MG Tab Take  by mouth.    Physician Outpatient   Calcium-Magnesium-Vitamin D (CALCIUM 1200+D3 PO) Take  by mouth.    Physician Outpatient   Cyanocobalamin (VITAMIN B-12 PO) Take  by mouth every day.    Physician Outpatient   Cholecalciferol (VITAMIN D) 2000 UNIT CAPS Take  by mouth every day.    Srg Physician   Vitamin Mixture (VLADISLAV-C PO) Take 1,000 mg by mouth every day.    Srg Physician   Acetaminophen (TYLENOL EXTRA STRENGTH PO) Take  by mouth as needed.    Srg Physician   lisinopril (PRINIVIL) 10 MG TABS Take 10 mg by mouth every day.    Srg Physician           Past Medical History:   Diagnosis Date   • Anesthesia     postop n/v   • Bowel habit changes     IBS   • CATARACT     bilateral IOL   • HTN (hypertension)    • Mixed connective tissue disease (HCC)    • Seasonal allergies    • Snoring             reports that she has never smoked. She has never used smokeless tobacco. She reports that she drinks alcohol. She reports that she does not use drugs.             Physical Exam:     /76 (BP Location: Left arm, Patient Position: Sitting, BP Cuff Size: Adult)   Pulse 74   Temp 37 °C (98.6 °F) (Temporal)   Ht 1.626 m (5' 4\")   Wt 81.3 kg (179 lb 3.7 oz)   SpO2 97%   BMI 30.77 kg/m²         GEN: well-appearing, NAD  Head: no focal alopecia, no hair thinning  ENT: no conjunctival icterus or injection, no oral ulcers  CV: nml S1, S2, no murmurs, RRR  Pulm: normal chest expansion, " speaking in full sentences, CTAB, no wheezing  MUSCUSKELETAL:  no edema, dactylitis, entesitis     WRISTS:   no tenderness no synovitis  MCPS:   no tenderness no synovitis  PIPS:    no tenderness no synovitis  DIPS: no tenderness no synovitis  SKIN: + bluish discoloration of right 1st finger, + periungual erythema and overall mildly puffy hands            Labs:    Results for orders placed or performed during the hospital encounter of 03/19/19   G6PD QUANT + RBC   Result Value Ref Range    G-6-Pd 11.2 9.9 - 16.6 U/g Hb   COMPLEMENT C3   Result Value Ref Range    C3 Complement 120.0 87.0 - 200.0 mg/dL   COMPLEMENT C4   Result Value Ref Range    Complement C4 36.0 19.0 - 52.0 mg/dL   CBC WITH DIFFERENTIAL   Result Value Ref Range    WBC 6.2 4.8 - 10.8 K/uL    RBC 4.28 4.20 - 5.40 M/uL    Hemoglobin 13.8 12.0 - 16.0 g/dL    Hematocrit 42.1 37.0 - 47.0 %    MCV 98.4 (H) 81.4 - 97.8 fL    MCH 32.2 27.0 - 33.0 pg    MCHC 32.8 (L) 33.6 - 35.0 g/dL    RDW 46.3 35.9 - 50.0 fL    Platelet Count 213 164 - 446 K/uL    MPV 10.2 9.0 - 12.9 fL    Neutrophils-Polys 72.60 (H) 44.00 - 72.00 %    Lymphocytes 19.20 (L) 22.00 - 41.00 %    Monocytes 6.60 0.00 - 13.40 %    Eosinophils 0.60 0.00 - 6.90 %    Basophils 0.80 0.00 - 1.80 %    Immature Granulocytes 0.20 0.00 - 0.90 %    Nucleated RBC 0.00 /100 WBC    Neutrophils (Absolute) 4.49 2.00 - 7.15 K/uL    Lymphs (Absolute) 1.19 1.00 - 4.80 K/uL    Monos (Absolute) 0.41 0.00 - 0.85 K/uL    Eos (Absolute) 0.04 0.00 - 0.51 K/uL    Baso (Absolute) 0.05 0.00 - 0.12 K/uL    Immature Granulocytes (abs) 0.01 0.00 - 0.11 K/uL    NRBC (Absolute) 0.00 K/uL   BUN   Result Value Ref Range    Bun 31 (H) 8 - 22 mg/dL   HEPATIC FUNCTION PANEL   Result Value Ref Range    Alkaline Phosphatase 59 30 - 99 U/L    AST(SGOT) 16 12 - 45 U/L    ALT(SGPT) 14 2 - 50 U/L    Total Bilirubin 0.3 0.1 - 1.5 mg/dL    Direct Bilirubin <0.1 0.1 - 0.5 mg/dL    Indirect Bilirubin see below 0.0 - 1.0 mg/dL    Albumin 4.4  3.2 - 4.9 g/dL    Total Protein 7.2 6.0 - 8.2 g/dL   WESTERGREN SED RATE   Result Value Ref Range    Sed Rate Westergren 10 0 - 30 mm/hour   CRP QUANTITIVE (NON-CARDIAC)   Result Value Ref Range    Stat C-Reactive Protein 0.47 0.00 - 0.75 mg/dL   CREATININE   Result Value Ref Range    Creatinine 1.00 0.50 - 1.40 mg/dL   DSDNA AB, IGG W/RFLX TO IFA TITER   Result Value Ref Range    Anti-Dna -Ds None Detected None Detected   COMPLEMENT TOTAL (CH50)   Result Value Ref Range    Complement Total Hemolytic 102 60 - 144  Units   ESTIMATED GFR   Result Value Ref Range    GFR If African American >60 >60 mL/min/1.73 m 2    GFR If Non  56 (A) >60 mL/min/1.73 m 2         Impression/Plan:    1.  Undifferentiated connective tissue disease- rash, raynaud's, periungual erythema and + SHIRAZ, speckled  2.  Long term use of plaquenil    Remains asymptomatic on Plaquenil 200mg daily, discussed we will continue this dose, feel is beneficial to continue due to low risk and no side effects and seems to be controlling her disease well.    Labs 3/2019 reviewed:  CBC unremarkable  CMP with GFR 56, prior 57-70  dsDNA negative  ESR and CRP normal  C3 and C4 normal  G6PD adequeate    Plan:  Continue Plaquenil 200mg daily- Eye exam 2018 (in media)  Continue avoidance of sun  CBC, CMP, ESR, CRP, C3, C4 and UA prior to next visit in 6 months.       3.  Raynauds  Managed conservatively, no need for pharmacologic therapy at this time      Patient will be in contact with me for any questions or concerns, will otherwise follow up with me as scheduled in 6 months or sooner PRN.        Priya Rooney MD

## 2019-05-16 ENCOUNTER — HOSPITAL ENCOUNTER (OUTPATIENT)
Dept: LAB | Facility: MEDICAL CENTER | Age: 64
End: 2019-05-16
Attending: ANESTHESIOLOGY
Payer: COMMERCIAL

## 2019-05-16 LAB
ANION GAP SERPL CALC-SCNC: 6 MMOL/L (ref 0–11.9)
BUN SERPL-MCNC: 28 MG/DL (ref 8–22)
CALCIUM SERPL-MCNC: 8.9 MG/DL (ref 8.5–10.5)
CHLORIDE SERPL-SCNC: 105 MMOL/L (ref 96–112)
CO2 SERPL-SCNC: 28 MMOL/L (ref 20–33)
CREAT SERPL-MCNC: 0.87 MG/DL (ref 0.5–1.4)
GLUCOSE SERPL-MCNC: 100 MG/DL (ref 65–99)
POTASSIUM SERPL-SCNC: 4 MMOL/L (ref 3.6–5.5)
SODIUM SERPL-SCNC: 139 MMOL/L (ref 135–145)

## 2019-05-16 PROCEDURE — 80048 BASIC METABOLIC PNL TOTAL CA: CPT

## 2019-05-16 PROCEDURE — 36415 COLL VENOUS BLD VENIPUNCTURE: CPT

## 2019-07-09 ENCOUNTER — APPOINTMENT (RX ONLY)
Dept: URBAN - METROPOLITAN AREA CLINIC 4 | Facility: CLINIC | Age: 64
Setting detail: DERMATOLOGY
End: 2019-07-09

## 2019-07-09 DIAGNOSIS — D22 MELANOCYTIC NEVI: ICD-10-CM

## 2019-07-09 DIAGNOSIS — L73.8 OTHER SPECIFIED FOLLICULAR DISORDERS: ICD-10-CM

## 2019-07-09 DIAGNOSIS — D18.0 HEMANGIOMA: ICD-10-CM

## 2019-07-09 DIAGNOSIS — L57.0 ACTINIC KERATOSIS: ICD-10-CM

## 2019-07-09 DIAGNOSIS — L82.1 OTHER SEBORRHEIC KERATOSIS: ICD-10-CM

## 2019-07-09 DIAGNOSIS — L81.4 OTHER MELANIN HYPERPIGMENTATION: ICD-10-CM

## 2019-07-09 PROBLEM — D22.71 MELANOCYTIC NEVI OF RIGHT LOWER LIMB, INCLUDING HIP: Status: ACTIVE | Noted: 2019-07-09

## 2019-07-09 PROBLEM — D22.62 MELANOCYTIC NEVI OF LEFT UPPER LIMB, INCLUDING SHOULDER: Status: ACTIVE | Noted: 2019-07-09

## 2019-07-09 PROBLEM — D22.72 MELANOCYTIC NEVI OF LEFT LOWER LIMB, INCLUDING HIP: Status: ACTIVE | Noted: 2019-07-09

## 2019-07-09 PROBLEM — D48.5 NEOPLASM OF UNCERTAIN BEHAVIOR OF SKIN: Status: ACTIVE | Noted: 2019-07-09

## 2019-07-09 PROBLEM — D22.5 MELANOCYTIC NEVI OF TRUNK: Status: ACTIVE | Noted: 2019-07-09

## 2019-07-09 PROBLEM — D22.61 MELANOCYTIC NEVI OF RIGHT UPPER LIMB, INCLUDING SHOULDER: Status: ACTIVE | Noted: 2019-07-09

## 2019-07-09 PROBLEM — D23.72 OTHER BENIGN NEOPLASM OF SKIN OF LEFT LOWER LIMB, INCLUDING HIP: Status: ACTIVE | Noted: 2019-07-09

## 2019-07-09 PROBLEM — D18.01 HEMANGIOMA OF SKIN AND SUBCUTANEOUS TISSUE: Status: ACTIVE | Noted: 2019-07-09

## 2019-07-09 PROCEDURE — ? PRESCRIPTION

## 2019-07-09 PROCEDURE — 99213 OFFICE O/P EST LOW 20 MIN: CPT | Mod: 25

## 2019-07-09 PROCEDURE — ? COUNSELING

## 2019-07-09 PROCEDURE — ? ADDITIONAL NOTES

## 2019-07-09 PROCEDURE — ? SUNSCREEN RECOMMENDATIONS

## 2019-07-09 PROCEDURE — 17110 DESTRUCTION B9 LES UP TO 14: CPT

## 2019-07-09 PROCEDURE — ? LIQUID NITROGEN

## 2019-07-09 RX ORDER — TRETINOIN 0.5 MG/G
1 CREAM TOPICAL
Qty: 1 | Refills: 2 | Status: ERX | COMMUNITY
Start: 2019-07-09

## 2019-07-09 RX ADMIN — TRETINOIN 1: 0.5 CREAM TOPICAL at 00:00

## 2019-07-09 ASSESSMENT — LOCATION ZONE DERM
LOCATION ZONE: HAND
LOCATION ZONE: TRUNK
LOCATION ZONE: NOSE
LOCATION ZONE: FACE
LOCATION ZONE: HAND
LOCATION ZONE: LEG
LOCATION ZONE: ARM
LOCATION ZONE: NECK

## 2019-07-09 ASSESSMENT — LOCATION SIMPLE DESCRIPTION DERM
LOCATION SIMPLE: ABDOMEN
LOCATION SIMPLE: LEFT THIGH
LOCATION SIMPLE: LEFT NOSE
LOCATION SIMPLE: LEFT FOREHEAD
LOCATION SIMPLE: RIGHT LOWER BACK
LOCATION SIMPLE: LEFT HAND
LOCATION SIMPLE: RIGHT CHEEK
LOCATION SIMPLE: RIGHT HAND
LOCATION SIMPLE: LEFT ANTERIOR NECK
LOCATION SIMPLE: LEFT FOREARM
LOCATION SIMPLE: RIGHT FOREARM
LOCATION SIMPLE: LEFT POSTERIOR UPPER ARM
LOCATION SIMPLE: LEFT UPPER BACK
LOCATION SIMPLE: RIGHT THIGH
LOCATION SIMPLE: INFERIOR FOREHEAD
LOCATION SIMPLE: RIGHT POSTERIOR UPPER ARM
LOCATION SIMPLE: LEFT HAND
LOCATION SIMPLE: LEFT CHEEK
LOCATION SIMPLE: UPPER BACK

## 2019-07-09 ASSESSMENT — LOCATION DETAILED DESCRIPTION DERM
LOCATION DETAILED: LEFT CENTRAL MALAR CHEEK
LOCATION DETAILED: RIGHT ANTERIOR PROXIMAL THIGH
LOCATION DETAILED: RIGHT RIB CAGE
LOCATION DETAILED: PERIUMBILICAL SKIN
LOCATION DETAILED: RIGHT SUPERIOR MEDIAL MALAR CHEEK
LOCATION DETAILED: LEFT RADIAL DORSAL HAND
LOCATION DETAILED: LEFT ANTERIOR PROXIMAL THIGH
LOCATION DETAILED: LEFT INFERIOR MEDIAL FOREHEAD
LOCATION DETAILED: LEFT NASAL SIDEWALL
LOCATION DETAILED: LEFT MEDIAL FOREHEAD
LOCATION DETAILED: LEFT PROXIMAL POSTERIOR UPPER ARM
LOCATION DETAILED: RIGHT CENTRAL MALAR CHEEK
LOCATION DETAILED: SUPERIOR THORACIC SPINE
LOCATION DETAILED: RIGHT DISTAL POSTERIOR UPPER ARM
LOCATION DETAILED: RIGHT PROXIMAL DORSAL FOREARM
LOCATION DETAILED: LEFT SUPERIOR MEDIAL UPPER BACK
LOCATION DETAILED: RIGHT SUPERIOR MEDIAL MIDBACK
LOCATION DETAILED: LEFT PROXIMAL DORSAL FOREARM
LOCATION DETAILED: LEFT ULNAR DORSAL HAND
LOCATION DETAILED: RIGHT MEDIAL MALAR CHEEK
LOCATION DETAILED: LEFT INFERIOR ANTERIOR NECK
LOCATION DETAILED: INFERIOR MID FOREHEAD
LOCATION DETAILED: RIGHT RADIAL DORSAL HAND

## 2019-07-09 NOTE — PROCEDURE: ADDITIONAL NOTES
Detail Level: Detailed
Additional Notes: Recommended she uses laser at her work for these sun spots.
Additional Notes: Treated multiple vickie on the left side of face with electrocardiography

## 2019-07-09 NOTE — PROCEDURE: LIQUID NITROGEN
Duration Of Freeze Thaw-Cycle (Seconds): 0
Add 52 Modifier (Optional): no
Bill Insurance (You Assume Risk Of Denial Or Audit By Selecting Yes): Yes
Medical Necessity Information: It is in your best interest to select a reason for this procedure from the list below. All of these items fulfill various CMS LCD requirements except the new and changing color options.
Post-Care Instructions: I reviewed with the patient in detail post-care instructions. Patient is to wear sunprotection, and avoid picking at any of the treated lesions. Pt may apply Vaseline to crusted or scabbing areas.
Detail Level: Detailed
Consent: The patient's consent was obtained including but not limited to risks of crusting, scabbing, blistering, scarring, darker or lighter pigmentary change, recurrence, incomplete removal and infection.
Medical Necessity Clause: This procedure was medically necessary because the lesions that were treated were:

## 2019-07-09 NOTE — HPI: FULL BODY SKIN EXAMINATION
How Severe Are Your Spot(S)?: mild
What Type Of Note Output Would You Prefer (Optional)?: Bullet Format
What Is The Reason For Today's Visit?: Full Body Skin Examination
What Is The Reason For Today's Visit? (Being Monitored For X): the re-examination of lesions previously examined
Additional History: Yearly full body exam, monitoring freckle on lower lip. Patient states it looks the same.

## 2019-08-27 ENCOUNTER — HOSPITAL ENCOUNTER (OUTPATIENT)
Dept: RADIOLOGY | Facility: MEDICAL CENTER | Age: 64
End: 2019-08-27
Attending: FAMILY MEDICINE
Payer: COMMERCIAL

## 2019-08-27 DIAGNOSIS — D17.22 LIPOMA OF LEFT FOREARM: ICD-10-CM

## 2019-08-27 PROCEDURE — 76882 US LMTD JT/FCL EVL NVASC XTR: CPT | Mod: LT

## 2019-09-18 ENCOUNTER — APPOINTMENT (RX ONLY)
Dept: URBAN - METROPOLITAN AREA CLINIC 22 | Facility: CLINIC | Age: 64
Setting detail: DERMATOLOGY
End: 2019-09-18

## 2019-09-18 DIAGNOSIS — D17 BENIGN LIPOMATOUS NEOPLASM: ICD-10-CM

## 2019-09-18 DIAGNOSIS — L57.0 ACTINIC KERATOSIS: ICD-10-CM

## 2019-09-18 DIAGNOSIS — L81.4 OTHER MELANIN HYPERPIGMENTATION: ICD-10-CM

## 2019-09-18 DIAGNOSIS — L73.8 OTHER SPECIFIED FOLLICULAR DISORDERS: ICD-10-CM

## 2019-09-18 PROBLEM — D17.22 BENIGN LIPOMATOUS NEOPLASM OF SKIN AND SUBCUTANEOUS TISSUE OF LEFT ARM: Status: ACTIVE | Noted: 2019-09-18

## 2019-09-18 PROCEDURE — 17110 DESTRUCTION B9 LES UP TO 14: CPT

## 2019-09-18 PROCEDURE — ? COUNSELING

## 2019-09-18 PROCEDURE — 99213 OFFICE O/P EST LOW 20 MIN: CPT | Mod: 25

## 2019-09-18 PROCEDURE — ? LIQUID NITROGEN

## 2019-09-18 PROCEDURE — ? ELECTRODESICCATION

## 2019-09-18 PROCEDURE — 17000 DESTRUCT PREMALG LESION: CPT | Mod: 59

## 2019-09-18 PROCEDURE — ? ADDITIONAL NOTES

## 2019-09-18 PROCEDURE — 17003 DESTRUCT PREMALG LES 2-14: CPT | Mod: 59

## 2019-09-18 ASSESSMENT — LOCATION DETAILED DESCRIPTION DERM
LOCATION DETAILED: NASAL DORSUM
LOCATION DETAILED: LEFT VENTRAL PROXIMAL FOREARM
LOCATION DETAILED: LEFT SUPERIOR MEDIAL MALAR CHEEK
LOCATION DETAILED: RIGHT PROXIMAL DORSAL FOREARM
LOCATION DETAILED: LEFT MEDIAL FOREHEAD
LOCATION DETAILED: LEFT NASAL SIDEWALL
LOCATION DETAILED: LEFT PROXIMAL DORSAL FOREARM
LOCATION DETAILED: LEFT INFERIOR MEDIAL FOREHEAD
LOCATION DETAILED: LEFT MEDIAL MALAR CHEEK

## 2019-09-18 ASSESSMENT — LOCATION SIMPLE DESCRIPTION DERM
LOCATION SIMPLE: NOSE
LOCATION SIMPLE: RIGHT FOREARM
LOCATION SIMPLE: LEFT FOREHEAD
LOCATION SIMPLE: LEFT NOSE
LOCATION SIMPLE: LEFT FOREARM
LOCATION SIMPLE: LEFT CHEEK

## 2019-09-18 ASSESSMENT — LOCATION ZONE DERM
LOCATION ZONE: FACE
LOCATION ZONE: ARM
LOCATION ZONE: NOSE

## 2019-09-18 NOTE — PROCEDURE: ADDITIONAL NOTES
Detail Level: Generalized
Additional Notes: Discussed risks and benefits and decided since her 2 lipomas are very small we will hold off and wait.

## 2019-09-18 NOTE — PROCEDURE: LIQUID NITROGEN
Consent: The patient's consent was obtained including but not limited to risks of crusting, scabbing, blistering, scarring, darker or lighter pigmentary change, recurrence, incomplete removal and infection.
Number Of Freeze-Thaw Cycles: 2 freeze-thaw cycles
Render Post-Care Instructions In Note?: no
Detail Level: Detailed
Post-Care Instructions: I reviewed with the patient in detail post-care instructions. Patient is to wear sunprotection, and avoid picking at any of the treated lesions. Pt may apply Vaseline to crusted or scabbing areas.
Duration Of Freeze Thaw-Cycle (Seconds): 3

## 2019-09-18 NOTE — PROCEDURE: ELECTRODESICCATION
Medical Necessity Information: It is in your best interest to select a reason for this procedure from the list below. All of these items fulfill various CMS LCD requirements except the new and changing color options.
Detail Level: Simple
Anesthesia Type: 1% lidocaine with epinephrine
Post-Care Instructions: I reviewed with the patient in detail post-care instructions. Patient is to wear sunprotection, and avoid picking at any of the treated lesions. Pt may apply Vaseline to crusted or scabbing areas
Medical Necessity Clause: This procedure was medically necessary because the lesions that were treated were:
Include Z78.9 (Other Specified Conditions Influencing Health Status) As An Associated Diagnosis?: No
Consent: The patient's consent was obtained including but not limited to risks of crusting, scabbing, blistering, scarring, darker or lighter pigmentary change, recurrence, incomplete removal and infection.

## 2019-09-30 ENCOUNTER — HOSPITAL ENCOUNTER (OUTPATIENT)
Dept: LAB | Facility: MEDICAL CENTER | Age: 64
End: 2019-09-30
Attending: FAMILY MEDICINE
Payer: COMMERCIAL

## 2019-09-30 ENCOUNTER — HOSPITAL ENCOUNTER (OUTPATIENT)
Dept: LAB | Facility: MEDICAL CENTER | Age: 64
End: 2019-09-30
Attending: INTERNAL MEDICINE
Payer: COMMERCIAL

## 2019-09-30 DIAGNOSIS — Z79.899 LONG-TERM USE OF PLAQUENIL: ICD-10-CM

## 2019-09-30 DIAGNOSIS — M35.9 UNDIFFERENTIATED CONNECTIVE TISSUE DISEASE (HCC): ICD-10-CM

## 2019-09-30 LAB
ALBUMIN SERPL BCP-MCNC: 4.5 G/DL (ref 3.2–4.9)
ALBUMIN/GLOB SERPL: 1.8 G/DL
ALP SERPL-CCNC: 63 U/L (ref 30–99)
ALT SERPL-CCNC: 17 U/L (ref 2–50)
ANION GAP SERPL CALC-SCNC: 10 MMOL/L (ref 0–11.9)
APPEARANCE UR: CLEAR
AST SERPL-CCNC: 19 U/L (ref 12–45)
BASOPHILS # BLD AUTO: 0.7 % (ref 0–1.8)
BASOPHILS # BLD: 0.04 K/UL (ref 0–0.12)
BILIRUB SERPL-MCNC: 0.6 MG/DL (ref 0.1–1.5)
BILIRUB UR QL STRIP.AUTO: NEGATIVE
BUN SERPL-MCNC: 21 MG/DL (ref 8–22)
C3 SERPL-MCNC: 124 MG/DL (ref 87–200)
C4 SERPL-MCNC: 38 MG/DL (ref 19–52)
CALCIUM SERPL-MCNC: 9.8 MG/DL (ref 8.5–10.5)
CHLORIDE SERPL-SCNC: 104 MMOL/L (ref 96–112)
CHOLEST SERPL-MCNC: 201 MG/DL (ref 100–199)
CO2 SERPL-SCNC: 26 MMOL/L (ref 20–33)
COLOR UR: YELLOW
CREAT SERPL-MCNC: 1.02 MG/DL (ref 0.5–1.4)
CRP SERPL HS-MCNC: 0.49 MG/DL (ref 0–0.75)
EOSINOPHIL # BLD AUTO: 0.04 K/UL (ref 0–0.51)
EOSINOPHIL NFR BLD: 0.7 % (ref 0–6.9)
ERYTHROCYTE [DISTWIDTH] IN BLOOD BY AUTOMATED COUNT: 45.7 FL (ref 35.9–50)
ERYTHROCYTE [SEDIMENTATION RATE] IN BLOOD BY WESTERGREN METHOD: 7 MM/HOUR (ref 0–30)
FASTING STATUS PATIENT QL REPORTED: NORMAL
GLOBULIN SER CALC-MCNC: 2.5 G/DL (ref 1.9–3.5)
GLUCOSE SERPL-MCNC: 74 MG/DL (ref 65–99)
GLUCOSE SERPL-MCNC: 75 MG/DL (ref 65–99)
GLUCOSE UR STRIP.AUTO-MCNC: NEGATIVE MG/DL
HCT VFR BLD AUTO: 42.3 % (ref 37–47)
HDLC SERPL-MCNC: 86 MG/DL
HGB BLD-MCNC: 13.9 G/DL (ref 12–16)
IMM GRANULOCYTES # BLD AUTO: 0.01 K/UL (ref 0–0.11)
IMM GRANULOCYTES NFR BLD AUTO: 0.2 % (ref 0–0.9)
KETONES UR STRIP.AUTO-MCNC: NEGATIVE MG/DL
LDLC SERPL CALC-MCNC: 104 MG/DL
LEUKOCYTE ESTERASE UR QL STRIP.AUTO: NEGATIVE
LYMPHOCYTES # BLD AUTO: 1.3 K/UL (ref 1–4.8)
LYMPHOCYTES NFR BLD: 22.1 % (ref 22–41)
MCH RBC QN AUTO: 32 PG (ref 27–33)
MCHC RBC AUTO-ENTMCNC: 32.9 G/DL (ref 33.6–35)
MCV RBC AUTO: 97.5 FL (ref 81.4–97.8)
MICRO URNS: NORMAL
MONOCYTES # BLD AUTO: 0.36 K/UL (ref 0–0.85)
MONOCYTES NFR BLD AUTO: 6.1 % (ref 0–13.4)
NEUTROPHILS # BLD AUTO: 4.14 K/UL (ref 2–7.15)
NEUTROPHILS NFR BLD: 70.2 % (ref 44–72)
NITRITE UR QL STRIP.AUTO: NEGATIVE
NRBC # BLD AUTO: 0 K/UL
NRBC BLD-RTO: 0 /100 WBC
PH UR STRIP.AUTO: 6 [PH] (ref 5–8)
PLATELET # BLD AUTO: 223 K/UL (ref 164–446)
PMV BLD AUTO: 10.2 FL (ref 9–12.9)
POTASSIUM SERPL-SCNC: 4.4 MMOL/L (ref 3.6–5.5)
PROT SERPL-MCNC: 7 G/DL (ref 6–8.2)
PROT UR QL STRIP: NEGATIVE MG/DL
RBC # BLD AUTO: 4.34 M/UL (ref 4.2–5.4)
RBC UR QL AUTO: NEGATIVE
SODIUM SERPL-SCNC: 140 MMOL/L (ref 135–145)
SP GR UR STRIP.AUTO: 1.01
TRIGL SERPL-MCNC: 56 MG/DL (ref 0–149)
UROBILINOGEN UR STRIP.AUTO-MCNC: 0.2 MG/DL
WBC # BLD AUTO: 5.9 K/UL (ref 4.8–10.8)

## 2019-09-30 PROCEDURE — 85025 COMPLETE CBC W/AUTO DIFF WBC: CPT

## 2019-09-30 PROCEDURE — 80053 COMPREHEN METABOLIC PANEL: CPT

## 2019-09-30 PROCEDURE — 86140 C-REACTIVE PROTEIN: CPT

## 2019-09-30 PROCEDURE — 82947 ASSAY GLUCOSE BLOOD QUANT: CPT

## 2019-09-30 PROCEDURE — 80061 LIPID PANEL: CPT

## 2019-09-30 PROCEDURE — 81003 URINALYSIS AUTO W/O SCOPE: CPT

## 2019-09-30 PROCEDURE — 86160 COMPLEMENT ANTIGEN: CPT

## 2019-09-30 PROCEDURE — 83036 HEMOGLOBIN GLYCOSYLATED A1C: CPT

## 2019-09-30 PROCEDURE — 36415 COLL VENOUS BLD VENIPUNCTURE: CPT

## 2019-09-30 PROCEDURE — 85652 RBC SED RATE AUTOMATED: CPT

## 2019-10-01 LAB
EST. AVERAGE GLUCOSE BLD GHB EST-MCNC: 103 MG/DL
HBA1C MFR BLD: 5.2 % (ref 0–5.6)

## 2019-10-03 ENCOUNTER — OFFICE VISIT (OUTPATIENT)
Dept: RHEUMATOLOGY | Facility: MEDICAL CENTER | Age: 64
End: 2019-10-03
Payer: COMMERCIAL

## 2019-10-03 VITALS
HEIGHT: 63 IN | SYSTOLIC BLOOD PRESSURE: 120 MMHG | DIASTOLIC BLOOD PRESSURE: 60 MMHG | OXYGEN SATURATION: 99 % | WEIGHT: 176.59 LBS | TEMPERATURE: 97.7 F | HEART RATE: 69 BPM | BODY MASS INDEX: 31.29 KG/M2

## 2019-10-03 DIAGNOSIS — I73.00 RAYNAUD'S DISEASE WITHOUT GANGRENE: ICD-10-CM

## 2019-10-03 DIAGNOSIS — M35.9 UNDIFFERENTIATED CONNECTIVE TISSUE DISEASE (HCC): Primary | ICD-10-CM

## 2019-10-03 DIAGNOSIS — M17.11 PRIMARY OSTEOARTHRITIS OF RIGHT KNEE: ICD-10-CM

## 2019-10-03 DIAGNOSIS — Z79.899 LONG-TERM USE OF PLAQUENIL: ICD-10-CM

## 2019-10-03 PROCEDURE — 99214 OFFICE O/P EST MOD 30 MIN: CPT | Performed by: INTERNAL MEDICINE

## 2019-10-03 RX ORDER — PRASTERONE 6.5 MG/1
INSERT VAGINAL
Refills: 11 | COMMUNITY
Start: 2019-09-25

## 2019-10-03 RX ORDER — HYDROXYCHLOROQUINE SULFATE 200 MG/1
200 TABLET, FILM COATED ORAL DAILY
Qty: 90 TAB | Refills: 1 | Status: SHIPPED | OUTPATIENT
Start: 2019-10-03 | End: 2019-10-03 | Stop reason: SDUPTHER

## 2019-10-03 RX ORDER — HYDROXYCHLOROQUINE SULFATE 200 MG/1
200 TABLET, FILM COATED ORAL DAILY
Qty: 90 TAB | Refills: 2 | Status: SHIPPED | OUTPATIENT
Start: 2019-10-03

## 2019-10-03 NOTE — PROGRESS NOTES
RHEUMATOLOGY CLINIC FOLLOW UP NOTE        Chief complaint: Follow up UCTD        HPI:  64 y/o F presents today for follow up of UCTD,  she is a new patient to me, previously followed by Dr. Packer, last seen 10/2018.    She again reports that she has some right knee pain she started having after starting yoga, this worsened after sitting in the car for a long time.   Pain is mainly the posterior medial knee. She has been using CBD oil but no other medications as she does not feel it stops her from doing anything. She is also getting acupuncture and seeing chiropractor which has been helping.    Her right 2nd MCP has been feeling bigger and is sometimes painful though is intermittent.  Has a mild itchy rash on her chest with the dry weather and uses aquaphor for it.  No fevers or infections or other rashes.  Her Raynaud's is still intermittent but overall well controlled, no digital ulcers. No chest pain or SOB.  No fevers or infections or hospitalizations.              Rheum Background:  Per Dr. Packer's notes:  Ms. Zakiya Kelly  was first evaluated by Dr. Ramon Eugene for possible autoimmune syndrome March 11, 2011 with a history of recurrent pruritic rash that started about Thanksgiving of 2010. At the time she relates she correlated that the onset of symptoms preceded was preceded by the use of antibiotic therapy for an upper respiratory infection. She presented with a known positive SHIRAZ that had been ongoing for 7-8 years and a sensitivity to aspirin in the form of GI symptoms. The rash brought on with the antibiotic therapy seems to be aggravated with cold weather affecting the elbows as well as the axilla, inguinal area and also diffusely involving the neck and shoulders. At her initial visit she denied any small joint arthritis photosensitivity or chest pain and 3 consistent with pleurisy or pericardial pain.     The results of the lab noted in a notation on March 25, 2011 states that last from March 17, 2011  showed an negative ANCA level, SHIRAZ was 1:1280 diffuse pattern,     THyroid Peroxidase Antibody was 45 the C3 was normal at 143 and the C4 was normal at 44 and a CO2 was normal at within normal limits. CRP was slightly elevated at 6.4. Her cold agglutinins, cryoglobulins, cryofibrinogen were negative she was otherwise diagnosed with recurrent pruritic rash associated with a positive SHIRAZ consider for undifferentiated connective tissue disease and was started on Plaquenil 200 mg by mouth daily March 25, 2011     Next follow-up note is from February 6, 2015 at the time she has noted to have Raynaud's primarily of the index finger somewhat left greater than right as well as her recurrent urticarial rash that affects the neck and the elbows. She was maintained on Plaquenil and 200 mg 2 tablets by mouth daily throughout 2015.  she was evaluated at six-month intervals and continued to have episodes of Raynaud's however peripheral pulses were full and symmetric she is maintained on Plaquenil during her care.       Her visit February 17, 2017 she had undifferentiated connective tissue disease based on a high titer SHIRAZ recurrent urticarial rash as well as Raynaud's phenomenon has been clinically quiescent on hydroxychloroquine. At her last visit in August 18, 2017  she has been quiescent she has sometimes a mild rash on her neck but generally dermatologic manifestations were quiesced and no arthritis there was noted mild mottling of the skin and the plan was to continue hydroxychloroquine and Complements checked at titer SHIRAZ a UA and a CBC CMP check.     Labs:  SHIRAZ + 1:2560, speckled  RF negaitve  dsDNA negative  SCL-70 negative  ceontromere negative  Chromatin negative  SM, RNP negative  SSA, SSB negative     G6PD low        Past medical History:  UCTD, vertigo     Family History:  Sister had SLE, thyroid dysfunction - Hashimoto; father had bladder cancer      Social History:  Just started working at an aesthetics -  , NEVER smoker, social alcohol           ROS:    GEN: denies fevers, night sweats,weight loss or weight gain  Head: denies focal alopecia or hair thinning  ENT: denies change in vision, denies oral ulcers, denies epistaxis or sinusitis  CV:  no palpitations, no chest dyscomfort, no orthopnea  Pulm: no dyspnea, no cough  Heme/Onc: no history of cancers or hematologic abnormalities  Skin: per HPI  MS: per HPI          OUTPATIENT MEDS:    Prior to Admission medications    Medication Sig Start Date End Date Taking? Authorizing Provider   hydroxychloroquine (PLAQUENIL) 200 MG Tab Take 1 Tab by mouth every day. 8/7/19   Priya Rooney M.D.   ALPRAZolam (XANAX) 0.25 MG Tab TAKE 1/2 TO 1 TABLET BY MOUTH 3 TIMES A DAY AS NEEDED FOR ANXIETY. F41.9 3/25/19   Physician Outpatient   Lactobacillus (PROBIOTIC ACIDOPHILUS PO) Take  by mouth.    Physician Outpatient   tretinoin (RETIN-A) 0.025 % cream APPLY A PEA SIZED AMOUNT TO FACE TOPICALLY ONCE A DAY AT BEDTIME.IF DRY,THEN USE EVERY OTHER NIGHT 7/20/18   Physician Outpatient   fluticasone (FLONASE) 50 MCG/ACT nasal spray Spray 1 Spray in nose every day.    Physician Outpatient   NON SPECIFIED     Physician Outpatient   TURMERIC PO Take  by mouth.    Physician Outpatient   Magnesium 500 MG Tab Take  by mouth.    Physician Outpatient   Calcium-Magnesium-Vitamin D (CALCIUM 1200+D3 PO) Take  by mouth.    Physician Outpatient   Cyanocobalamin (VITAMIN B-12 PO) Take  by mouth every day.    Physician Outpatient   Cholecalciferol (VITAMIN D) 2000 UNIT CAPS Take  by mouth every day.    Srg Physician   Vitamin Mixture (VLADISLAV-C PO) Take 1,000 mg by mouth every day.    Srg Physician   Acetaminophen (TYLENOL EXTRA STRENGTH PO) Take  by mouth as needed.    Srg Physician   lisinopril (PRINIVIL) 10 MG TABS Take 10 mg by mouth every day.    Srg Physician           Past Medical History:   Diagnosis Date   • Anesthesia     postop n/v   • Bowel habit changes     IBS   • CATARACT      "bilateral IOL   • HTN (hypertension)    • Mixed connective tissue disease (HCC)    • Seasonal allergies    • Snoring             reports that she has never smoked. She has never used smokeless tobacco. She reports that she drinks alcohol. She reports that she does not use drugs.             Physical Exam:     /60 (BP Location: Left arm, Patient Position: Sitting, BP Cuff Size: Adult)   Pulse 69   Temp 36.5 °C (97.7 °F) (Temporal)   Ht 1.588 m (5' 2.5\")   Wt 80.1 kg (176 lb 9.4 oz)   SpO2 99%   BMI 31.78 kg/m²         GEN: well-appearing, NAD  Head: no focal alopecia, no hair thinning  ENT: no conjunctival icterus or injection  CV: nml S1, S2, no murmurs, RRR  Pulm: normal chest expansion, speaking in full sentences, CTAB, no wheezing  MUSCUSKELETAL:  no edema, dactylitis, entesitis     ELBOWS:  no tenderness no synovitis  WRISTS:   no tenderness no synovitis  MCPS:   Right 2nd MCP with bony enlargement, no synovitis.  + tenderness  PIPS:    no tenderness no synovitis  DIPS: no tenderness no synovitis  KNEES:  right knee without swelling or warmth.  Medial posterior knee with tenderness and possible cyst.     Varus and valgus stress testing normal.  Negative Lachman's.  Negative macmurrays.  ANKLES:  no tenderness no synovitis           SKIN: no rashes, skin changes              Labs:    Results for orders placed or performed during the hospital encounter of 09/30/19   Lipid Profile   Result Value Ref Range    Cholesterol,Tot 201 (H) 100 - 199 mg/dL    Triglycerides 56 0 - 149 mg/dL    HDL 86 >=40 mg/dL     (H) <100 mg/dL   Blood Glucose   Result Value Ref Range    Glucose 74 65 - 99 mg/dL   HEMOGLOBIN A1C   Result Value Ref Range    Glycohemoglobin 5.2 0.0 - 5.6 %    Est Avg Glucose 103 mg/dL         Impression/Plan:  1.  Undifferentiated connective tissue disease- rash, raynaud's, periungual erythema and + SHIRAZ, speckled  2.  Long term use of plaquenil     Remains asymptomatic on Plaquenil 200mg " daily, discussed we will continue this dose, feel is beneficial to continue due to low risk and no side effects and seems to be controlling her disease well.     Labs 9/30/2019 reviewed:  CMP with stable GFR at 54  CBC normal  ESR and CRP normal  C3 and C4 normal and stable  UA without blood or protein    G6PD adequeate     Plan:  Continue Plaquenil 200mg daily- Eye exam 4/2019 (in media), next appt january  Continue avoidance of sun  CBC, CMP, ESR, CRP, C3, C4 and UA prior to next visit in 6 months.        3.  Raynauds  Managed conservatively, no need for pharmacologic therapy at this time  Advised to keep hands warm. Mittens and hand warmers in winter       4.  Right knee osteoarthritis  Tricompartmental OA on xray, also with chondrocalcinosis  Possible Baker's cyst with location of pain  Referral to PT  May consider US to eval for Bakers cyst if does not improve          Patient will be in contact with me for any questions or concerns, will otherwise follow up with me as scheduled in 6 months or sooner as needed.        Priya Rooney MD

## 2019-10-22 ENCOUNTER — TELEPHONE (OUTPATIENT)
Dept: NEUROLOGY | Facility: MEDICAL CENTER | Age: 64
End: 2019-10-22

## 2019-10-22 NOTE — TELEPHONE ENCOUNTER
Patient called and [facility]. She called for referral status for PT. She says that she has not heard from anyone yet. I called her back at [phone] and LM to advise her that someone from [facility] referrals had called and [facility] on three different dates, but I also gave her the phone number for the physical therapy department and recommended that she call to schedule.

## 2019-11-04 ENCOUNTER — APPOINTMENT (OUTPATIENT)
Dept: PHYSICAL THERAPY | Facility: REHABILITATION | Age: 64
End: 2019-11-04
Attending: INTERNAL MEDICINE
Payer: COMMERCIAL

## 2019-11-07 ENCOUNTER — APPOINTMENT (OUTPATIENT)
Dept: PHYSICAL THERAPY | Facility: REHABILITATION | Age: 64
End: 2019-11-07
Payer: COMMERCIAL

## 2019-11-11 ENCOUNTER — APPOINTMENT (OUTPATIENT)
Dept: PHYSICAL THERAPY | Facility: REHABILITATION | Age: 64
End: 2019-11-11
Payer: COMMERCIAL

## 2019-11-14 ENCOUNTER — APPOINTMENT (OUTPATIENT)
Dept: PHYSICAL THERAPY | Facility: REHABILITATION | Age: 64
End: 2019-11-14
Payer: COMMERCIAL

## 2019-11-14 ENCOUNTER — PHYSICAL THERAPY (OUTPATIENT)
Dept: PHYSICAL THERAPY | Facility: REHABILITATION | Age: 64
End: 2019-11-14
Attending: INTERNAL MEDICINE
Payer: COMMERCIAL

## 2019-11-14 DIAGNOSIS — M17.11 PRIMARY OSTEOARTHRITIS OF RIGHT KNEE: ICD-10-CM

## 2019-11-14 PROCEDURE — 97110 THERAPEUTIC EXERCISES: CPT

## 2019-11-14 ASSESSMENT — ENCOUNTER SYMPTOMS
PAIN SCALE AT HIGHEST: 5
PAIN SCALE: 0
QUALITY: SHARP
QUALITY: ACHING
QUALITY: THROBBING
PAIN SCALE AT LOWEST: 0

## 2019-11-14 NOTE — OP THERAPY EVALUATION
Outpatient Physical Therapy  INITIAL EVALUATION    [facility]  [address], [address]  [facility] [address]  Phone:  [phone]  Fax:  [phone]    Date of Evaluation: 2019    Patient: Zakiya Kelly  YOB: 1955  MRN: [id number]     Referring Provider: Priya Rooney M.D.  [address] [place] [address] [address]  [place] [address]   Referring Diagnosis Primary osteoarthritis of right knee     Time Calculation  Start time: 730  Stop time: 815 Time Calculation (min): 45 minutes       Physical Therapy Occurrence Codes    Date physical therapy care plan established or reviewed:  19   Date physical therapy treatment started:  19          Chief Complaint: R knee    Visit Diagnoses     ICD-10-CM   1. Primary osteoarthritis of right knee M17.11         Subjective:   History of Present Illness:     Mechanism of injury:  Zakiya Kelly is a 64 y.o. female that presents to therapy with R knee pain for about a year. She was initiating a fitness program. She states that symptoms came on with sudden onset while performing yoga. Her pain is located along entire R knee.     Aggravating factors: sitting too long > 2 hours, squatting, steps and stairs, kneeling  Relieving factors: topicals, ice    ADL limitations: would like to initiate recreational fitness, walking routine.  Pain:     Current pain ratin    At best pain ratin    At worst pain ratin    Quality:  Aching, sharp and throbbing      Past Medical History:   Diagnosis Date   • Anesthesia     postop n/v   • Bowel habit changes     IBS   • CATARACT     bilateral IOL   • HTN (hypertension)    • Mixed connective tissue disease (HCC)    • Seasonal allergies    • Snoring      Past Surgical History:   Procedure Laterality Date   • HEMORRHOIDECTOMY  2017    Procedure: HEMORRHOIDECTOMY ARTERY LIGATION / RECTO ANAL REPAIR;  Surgeon: Eleazar Mckeon M.D.;  Location: SURGERY [facility];  Service:     • BUNIONECTOMY CIRILO Right 9/8/2016    Procedure: BUNIONECTOMY CIRILO - 1ST MPJ;  Surgeon: Leonila Ruby D.P.M.;  Location: SURGERY SAME DAY [facility];  Service:    • CATARACT PHACO WITH IOL  3/19/2014    Performed by Hai Starr M.D. at SURGERY SAME DAY [facility] ORS   • INTERNAL FIXATION REMOVAL  6/11/2012    Performed by LEONILA RUBY at SURGERY SAME DAY [facility] ORS   • BUNIONECTOMY CIRILO  4/5/2012    Performed by LEONILA RUBY at SURGERY SAME DAY [facility] ORS   • CATARACT PHACO WITH IOL  7/12/2010    Performed by BUTCH MUÑIZ at SURGERY SAME DAY [facility] ORS   • CARPAL TUNNEL RELEASE      lt hand   • OTHER ORTHOPEDIC SURGERY      lt shoulder x 2   • OTHER ORTHOPEDIC SURGERY      bunionectomy bilat   • TONSILLECTOMY       Social History     Tobacco Use   • Smoking status: Never Smoker   • Smokeless tobacco: Never Used   Substance Use Topics   • Alcohol use: Yes     Comment: 6 per week     Family and Occupational History     Patient does not qualify to have social determinant information on file (likely too young).   Socioeconomic History   • Marital status:      Spouse name: Not on file   • Number of children: Not on file   • Years of education: Not on file   • Highest education level: Not on file   Occupational History   • Not on file       Objective     Lumbar Screen  Lumbar range of motion within normal limits.    Neurological Testing     Sensation     Knee   Left Knee   Intact: light touch    Right Knee   Intact: light touch     Reflexes   Left   Clonus sign: negative    Right   Clonus sign: negative    Tenderness     Right Knee   Tenderness in the inferior fat pad and popliteal fossa.     Active Range of Motion   Left Knee   Normal active range of motion    Right Knee   Normal active range of motion    Passive Range of Motion   Left Knee   Normal passive range of motion    Right Knee   Normal passive range of motion    Patellar Static Positioning   Left Knee: WFL  Right  Knee: WFL    Patellar Mobility     Right Knee Hypomobile in the medial and lateral patellar tendon(s).     Strength:      Left Hip   Planes of Motion   Extension: 4+  Abduction: 4+    Right Hip   Planes of Motion   Extension: 4+  Abduction: 4    Left Knee   Flexion: 4+  Extension: 5  Quadriceps contraction: fair    Right Knee   Flexion: 4+  Extension: 5  Quadriceps contraction: fair    Tests     Right Knee   Positive patellar compression.   Negative lateral Mulu, medial Mulu, Thessaly's test at 5 degrees, valgus stress test at 0 degrees, valgus stress test at 30 degrees, varus stress test at 0 degrees and varus stress test at 30 degrees.         Therapeutic Exercises (CPT 50627):       Therapeutic Exercise Summary: HEP instruction/performance and development. Handout provided and exercises located below:  Access Code: [email or web address]   URL: [email or web address]   Date: 11/14/2019   Prepared by: Maykel Mejias      Exercises  · Sit to Stand without Arm Support - 10 reps - 3x daily  · Long Sitting Quad Set - 15 reps - 3 hold - 3x daily  · Clamshell - 20 reps - 2 sets - 1x daily      Time-based treatments/modalities:  Therapeutic exercise minutes (CPT 46911): 15 minutes       Assessment, Response and Plan:   Assessment details:  Zakiya Kelly is a 64 y.o. female with signs and symptoms consistent with patella femoral chrondomalacia. She requires skilled physical therapy intervention to decrease pain, increase functional mobility, improve ADL completion and establish a home exercise program.  Goals:   Short Term Goals:   1. Patient will be Independent with prescribed Home Exercise Program (HEP) and will be able to demonstrate exercises without cues for improved overall symptoms/activity tolerance.   2. Pt will improve hip strength by a half muscle grade for improved medial and lateral knee control during squatting maneuvers.   Short term goal time span:  2-4 weeks      Long Term Goals:    3. Pt will initiate  a walking routine without increased knee pain.   4. Pt will improve LEFS score to greater than 70/80 indicative of improved function and reduced perceived disability.  Long term goal time span:  4-6 weeks    Plan:   Planned therapy interventions:  Therapeutic Exercise (CPT 12914), Therapeutic Activities (CPT 22337), Manual Therapy (CPT 25786), Neuromuscular Re-education (CPT 08964) and E Stim Unattended (CPT 13454)  Frequency: 1-2x/week.  Duration in weeks:  6  Discussed with:  Patient      Functional Assessment Used  PT Functional Assessment Tool Used: LEFS  PT Functional Assessment Score: 66/80     Referring provider co-signature:  I have reviewed this plan of care and my co-signature certifies the need for services.  Certification Dates:   From 11/14/19   To 12/26/19    Physician Signature: ________________________________ Date: ______________

## 2019-11-18 ENCOUNTER — APPOINTMENT (OUTPATIENT)
Dept: PHYSICAL THERAPY | Facility: REHABILITATION | Age: 64
End: 2019-11-18
Attending: INTERNAL MEDICINE
Payer: COMMERCIAL

## 2019-11-19 ENCOUNTER — APPOINTMENT (OUTPATIENT)
Dept: PHYSICAL THERAPY | Facility: REHABILITATION | Age: 64
End: 2019-11-19
Payer: COMMERCIAL

## 2019-11-20 ENCOUNTER — HOSPITAL ENCOUNTER (OUTPATIENT)
Dept: RADIOLOGY | Facility: MEDICAL CENTER | Age: 64
End: 2019-11-20
Attending: FAMILY MEDICINE
Payer: COMMERCIAL

## 2019-11-20 ENCOUNTER — APPOINTMENT (OUTPATIENT)
Dept: PHYSICAL THERAPY | Facility: REHABILITATION | Age: 64
End: 2019-11-20
Attending: INTERNAL MEDICINE
Payer: COMMERCIAL

## 2019-11-20 DIAGNOSIS — Z12.31 VISIT FOR SCREENING MAMMOGRAM: ICD-10-CM

## 2019-11-20 PROCEDURE — 77063 BREAST TOMOSYNTHESIS BI: CPT

## 2019-11-25 ENCOUNTER — APPOINTMENT (OUTPATIENT)
Dept: PHYSICAL THERAPY | Facility: REHABILITATION | Age: 64
End: 2019-11-25
Attending: INTERNAL MEDICINE
Payer: COMMERCIAL

## 2019-11-27 ENCOUNTER — APPOINTMENT (OUTPATIENT)
Dept: PHYSICAL THERAPY | Facility: REHABILITATION | Age: 64
End: 2019-11-27
Attending: INTERNAL MEDICINE
Payer: COMMERCIAL

## 2019-12-02 ENCOUNTER — APPOINTMENT (OUTPATIENT)
Dept: PHYSICAL THERAPY | Facility: REHABILITATION | Age: 64
End: 2019-12-02
Attending: INTERNAL MEDICINE
Payer: COMMERCIAL

## 2019-12-02 ENCOUNTER — TELEPHONE (OUTPATIENT)
Dept: PHYSICAL THERAPY | Facility: REHABILITATION | Age: 64
End: 2019-12-02

## 2019-12-02 NOTE — OP THERAPY DAILY TREATMENT
Outpatient Physical Therapy  DAILY TREATMENT     [facility]  [address], [address]  [facility] [address]  Phone:  [phone]  Fax:  [phone]    Date: 12/02/2019    Patient: Zakiya Kelly  YOB: 1955  MRN: [id number]     Time Calculation               Chief Complaint: No chief complaint on file.    Visit #: 2    SUBJECTIVE:  ***    OBJECTIVE:  Current objective measures: ***        Exercises/Treatment      Sit to Stand without Arm Support - 10 reps - 3x daily  ·           Long Sitting Quad Set - 15 reps - 3 hold - 3x daily  ·           Clamshell - 20 reps - 2 sets - 1x daily  Time-based treatments/modalities:          Pain rating before treatment: {PAIN NUMBERS_1-10:46879}  Pain rating after treatment: {PAIN NUMBERS_1-10:62837}    ASSESSMENT:   Response to treatment: ***    PLAN/RECOMMENDATIONS:   Plan for treatment: therapy treatment to continue next visit.  Planned interventions for next visit: continue with current treatment.

## 2019-12-02 NOTE — OP THERAPY DISCHARGE SUMMARY
Outpatient Physical Therapy  DISCHARGE SUMMARY NOTE      [facility]  [address], [address]  [facility] [address]  Phone:  [phone]  Fax:  [phone]    Date of Visit: 12/02/2019    Patient: Zakiya Kelly  YOB: 1955  MRN: [id number]     Referring Provider: Priya Rooney M.D.   Referring Diagnosis Primary osteoarthritis of right knee     Physical Therapy Occurrence Codes    Date physical therapy care plan established or reviewed:  11/14/19   Date physical therapy treatment started:  11/14/19          Functional Assessment Used        Your patient is being discharged from Physical Therapy with the following comments:   · Goals not met  · Patient has failed to schedule or reschedule follow-up visits  · Patient has been non-compliant with physical therapy plan of care    Comments:  Pt notified clinic to cancel all future appointments as she does not have time to attend therapy right now.      Limitations Remaining:  See evaluation    Recommendations:  See evaluation    Maykel Mejias, PT, DPT    Date: 12/2/2019

## 2019-12-04 ENCOUNTER — APPOINTMENT (OUTPATIENT)
Dept: PHYSICAL THERAPY | Facility: REHABILITATION | Age: 64
End: 2019-12-04
Attending: INTERNAL MEDICINE
Payer: COMMERCIAL

## 2019-12-09 ENCOUNTER — APPOINTMENT (OUTPATIENT)
Dept: PHYSICAL THERAPY | Facility: REHABILITATION | Age: 64
End: 2019-12-09
Attending: INTERNAL MEDICINE
Payer: COMMERCIAL

## 2019-12-11 ENCOUNTER — APPOINTMENT (OUTPATIENT)
Dept: PHYSICAL THERAPY | Facility: REHABILITATION | Age: 64
End: 2019-12-11
Attending: INTERNAL MEDICINE
Payer: COMMERCIAL

## 2020-02-04 ENCOUNTER — APPOINTMENT (RX ONLY)
Dept: URBAN - METROPOLITAN AREA CLINIC 4 | Facility: CLINIC | Age: 65
Setting detail: DERMATOLOGY
End: 2020-02-04

## 2020-02-04 DIAGNOSIS — L30.9 DERMATITIS, UNSPECIFIED: ICD-10-CM

## 2020-02-04 PROBLEM — D48.5 NEOPLASM OF UNCERTAIN BEHAVIOR OF SKIN: Status: ACTIVE | Noted: 2020-02-04

## 2020-02-04 PROCEDURE — ? BIOPSY BY SHAVE METHOD AND DESTRUCTION

## 2020-02-04 PROCEDURE — 99213 OFFICE O/P EST LOW 20 MIN: CPT | Mod: 25

## 2020-02-04 PROCEDURE — 11102 TANGNTL BX SKIN SINGLE LES: CPT

## 2020-02-04 PROCEDURE — ? PRESCRIPTION SAMPLES PROVIDED

## 2020-02-04 PROCEDURE — ? COUNSELING

## 2020-02-04 ASSESSMENT — LOCATION ZONE DERM: LOCATION ZONE: FACE

## 2020-02-04 ASSESSMENT — LOCATION DETAILED DESCRIPTION DERM: LOCATION DETAILED: LEFT MEDIAL BUCCAL CHEEK

## 2020-02-04 ASSESSMENT — LOCATION SIMPLE DESCRIPTION DERM: LOCATION SIMPLE: LEFT CHEEK

## 2020-02-04 NOTE — PROCEDURE: MIPS QUALITY
Quality 431: Preventive Care And Screening: Unhealthy Alcohol Use - Screening: Patient screened for unhealthy alcohol use using a single question and scores less than 2 times per year
Detail Level: Detailed
Quality 111:Pneumonia Vaccination Status For Older Adults: Pneumococcal Vaccination not Administered or Previously Received, Reason not Otherwise Specified
Quality 226: Preventive Care And Screening: Tobacco Use: Screening And Cessation Intervention: Patient screened for tobacco use and is an ex/non-smoker
Quality 130: Documentation Of Current Medications In The Medical Record: Current Medications Documented

## 2020-02-04 NOTE — PROCEDURE: BIOPSY BY SHAVE METHOD AND DESTRUCTION
Detail Level: Detailed
Biopsy Type: H and E
Bill As?: Biopsy by Shave Method
Size Of Lesion In Cm (Optional): 0
Size Of Lesion After Curettage: 0.3
Anesthesia Type: 1% lidocaine with epinephrine
Anesthesia Volume In Cc: 2
Hemostasis: Aluminum Chloride and Electrocautery
Destruction Type: electrodesiccation
Number Of Curettages: 3
Wound Care: Vaseline
Lab: [id number]
Lab Facility: 
Render Path Notes In Note?: No
Consent: Written consent was obtained and risks were reviewed including but not limited to scarring, infection, bleeding, scabbing, incomplete removal, nerve damage and allergy to anesthesia.
Post-Care Instructions: I reviewed with the patient in detail post-care instructions. Patient is to keep the biopsy site dry overnight, and then apply bacitracin twice daily until healed. Patient may apply hydrogen peroxide soaks to remove any crusting.
Notification Instructions: Patient will be notified of biopsy results. However, patient instructed to call the office if not contacted within 2 weeks.
Billing Type: Third-Party Bill

## 2020-03-31 ENCOUNTER — HOSPITAL ENCOUNTER (OUTPATIENT)
Dept: LAB | Facility: MEDICAL CENTER | Age: 65
End: 2020-03-31
Attending: FAMILY MEDICINE
Payer: COMMERCIAL

## 2020-03-31 LAB
ALBUMIN SERPL BCP-MCNC: 4.7 G/DL (ref 3.2–4.9)
ALBUMIN/GLOB SERPL: 1.7 G/DL
ALP SERPL-CCNC: 78 U/L (ref 30–99)
ALT SERPL-CCNC: 19 U/L (ref 2–50)
ANION GAP SERPL CALC-SCNC: 14 MMOL/L (ref 7–16)
APPEARANCE UR: ABNORMAL
AST SERPL-CCNC: 19 U/L (ref 12–45)
BACTERIA #/AREA URNS HPF: ABNORMAL /HPF
BASOPHILS # BLD AUTO: 0.2 % (ref 0–1.8)
BASOPHILS # BLD: 0.01 K/UL (ref 0–0.12)
BILIRUB SERPL-MCNC: 0.6 MG/DL (ref 0.1–1.5)
BILIRUB UR QL STRIP.AUTO: NEGATIVE
BUN SERPL-MCNC: 22 MG/DL (ref 8–22)
C3 SERPL-MCNC: 98 MG/DL (ref 87–200)
C4 SERPL-MCNC: 30.4 MG/DL (ref 19–52)
CALCIUM SERPL-MCNC: 9.6 MG/DL (ref 8.5–10.5)
CHLORIDE SERPL-SCNC: 99 MMOL/L (ref 96–112)
CO2 SERPL-SCNC: 24 MMOL/L (ref 20–33)
COLOR UR: YELLOW
CREAT SERPL-MCNC: 0.94 MG/DL (ref 0.5–1.4)
CRP SERPL HS-MCNC: 0.36 MG/DL (ref 0–0.75)
EOSINOPHIL # BLD AUTO: 0.04 K/UL (ref 0–0.51)
EOSINOPHIL NFR BLD: 0.8 % (ref 0–6.9)
EPI CELLS #/AREA URNS HPF: ABNORMAL /HPF
ERYTHROCYTE [DISTWIDTH] IN BLOOD BY AUTOMATED COUNT: 46.2 FL (ref 35.9–50)
ERYTHROCYTE [SEDIMENTATION RATE] IN BLOOD BY WESTERGREN METHOD: 1 MM/HOUR (ref 0–30)
FASTING STATUS PATIENT QL REPORTED: NORMAL
GLOBULIN SER CALC-MCNC: 2.8 G/DL (ref 1.9–3.5)
GLUCOSE SERPL-MCNC: 69 MG/DL (ref 65–99)
GLUCOSE UR STRIP.AUTO-MCNC: NEGATIVE MG/DL
HCT VFR BLD AUTO: 44.1 % (ref 37–47)
HGB BLD-MCNC: 14.6 G/DL (ref 12–16)
HYALINE CASTS #/AREA URNS LPF: ABNORMAL /LPF
IMM GRANULOCYTES # BLD AUTO: 0.01 K/UL (ref 0–0.11)
IMM GRANULOCYTES NFR BLD AUTO: 0.2 % (ref 0–0.9)
KETONES UR STRIP.AUTO-MCNC: NEGATIVE MG/DL
LEUKOCYTE ESTERASE UR QL STRIP.AUTO: ABNORMAL
LYMPHOCYTES # BLD AUTO: 1.2 K/UL (ref 1–4.8)
LYMPHOCYTES NFR BLD: 25.2 % (ref 22–41)
MCH RBC QN AUTO: 32.2 PG (ref 27–33)
MCHC RBC AUTO-ENTMCNC: 33.1 G/DL (ref 33.6–35)
MCV RBC AUTO: 97.4 FL (ref 81.4–97.8)
MICRO URNS: ABNORMAL
MONOCYTES # BLD AUTO: 0.33 K/UL (ref 0–0.85)
MONOCYTES NFR BLD AUTO: 6.9 % (ref 0–13.4)
NEUTROPHILS # BLD AUTO: 3.17 K/UL (ref 2–7.15)
NEUTROPHILS NFR BLD: 66.7 % (ref 44–72)
NITRITE UR QL STRIP.AUTO: NEGATIVE
NRBC # BLD AUTO: 0 K/UL
NRBC BLD-RTO: 0 /100 WBC
PH UR STRIP.AUTO: 6.5 [PH] (ref 5–8)
PLATELET # BLD AUTO: 228 K/UL (ref 164–446)
PMV BLD AUTO: 9.8 FL (ref 9–12.9)
POTASSIUM SERPL-SCNC: 4.2 MMOL/L (ref 3.6–5.5)
PROT SERPL-MCNC: 7.5 G/DL (ref 6–8.2)
PROT UR QL STRIP: NEGATIVE MG/DL
RBC # BLD AUTO: 4.53 M/UL (ref 4.2–5.4)
RBC # URNS HPF: ABNORMAL /HPF
RBC UR QL AUTO: NEGATIVE
SODIUM SERPL-SCNC: 137 MMOL/L (ref 135–145)
SP GR UR STRIP.AUTO: 1.01
UROBILINOGEN UR STRIP.AUTO-MCNC: 0.2 MG/DL
WBC # BLD AUTO: 4.8 K/UL (ref 4.8–10.8)
WBC #/AREA URNS HPF: ABNORMAL /HPF

## 2020-03-31 PROCEDURE — 81001 URINALYSIS AUTO W/SCOPE: CPT

## 2020-03-31 PROCEDURE — 86140 C-REACTIVE PROTEIN: CPT

## 2020-03-31 PROCEDURE — 80053 COMPREHEN METABOLIC PANEL: CPT

## 2020-03-31 PROCEDURE — 85652 RBC SED RATE AUTOMATED: CPT

## 2020-03-31 PROCEDURE — 86160 COMPLEMENT ANTIGEN: CPT | Mod: 91

## 2020-03-31 PROCEDURE — 85025 COMPLETE CBC W/AUTO DIFF WBC: CPT

## 2020-03-31 PROCEDURE — 36415 COLL VENOUS BLD VENIPUNCTURE: CPT

## 2020-04-02 ENCOUNTER — TELEPHONE (OUTPATIENT)
Dept: PHYSICAL THERAPY | Facility: REHABILITATION | Age: 65
End: 2020-04-02

## 2020-05-07 ENCOUNTER — OFFICE VISIT (OUTPATIENT)
Dept: URGENT CARE | Facility: PHYSICIAN GROUP | Age: 65
End: 2020-05-07
Payer: COMMERCIAL

## 2020-05-07 VITALS
BODY MASS INDEX: 30.47 KG/M2 | HEART RATE: 71 BPM | OXYGEN SATURATION: 96 % | HEIGHT: 62 IN | RESPIRATION RATE: 18 BRPM | WEIGHT: 165.6 LBS | DIASTOLIC BLOOD PRESSURE: 86 MMHG | SYSTOLIC BLOOD PRESSURE: 144 MMHG | TEMPERATURE: 98.1 F

## 2020-05-07 DIAGNOSIS — R42 VERTIGO: ICD-10-CM

## 2020-05-07 DIAGNOSIS — H65.93 FLUID LEVEL BEHIND TYMPANIC MEMBRANE OF BOTH EARS: ICD-10-CM

## 2020-05-07 DIAGNOSIS — R11.0 NAUSEA: ICD-10-CM

## 2020-05-07 DIAGNOSIS — R09.81 NASAL SINUS CONGESTION: ICD-10-CM

## 2020-05-07 PROCEDURE — 99204 OFFICE O/P NEW MOD 45 MIN: CPT | Performed by: NURSE PRACTITIONER

## 2020-05-07 RX ORDER — MECLIZINE HYDROCHLORIDE 25 MG/1
25 TABLET ORAL 3 TIMES DAILY PRN
Qty: 30 TAB | Refills: 0 | Status: SHIPPED | OUTPATIENT
Start: 2020-05-07 | End: 2020-09-21

## 2020-05-07 RX ORDER — ONDANSETRON 4 MG/1
4 TABLET, ORALLY DISINTEGRATING ORAL EVERY 6 HOURS PRN
Qty: 10 TAB | Refills: 0 | Status: SHIPPED | OUTPATIENT
Start: 2020-05-07 | End: 2020-09-21

## 2020-05-07 RX ORDER — PREDNISONE 10 MG/1
10 TABLET ORAL DAILY
Qty: 5 TAB | Refills: 0 | Status: SHIPPED | OUTPATIENT
Start: 2020-05-07 | End: 2020-05-12

## 2020-05-07 ASSESSMENT — ENCOUNTER SYMPTOMS
BLURRED VISION: 0
SPEECH CHANGE: 0
DOUBLE VISION: 0
EYE PAIN: 0
WEAKNESS: 0
NAUSEA: 0
NECK PAIN: 0
CHILLS: 0
CONSTIPATION: 0
EYE REDNESS: 0
PHOTOPHOBIA: 0
TINGLING: 0
DIZZINESS: 0
ORTHOPNEA: 0
SORE THROAT: 0
VOMITING: 0
DIARRHEA: 0
FEVER: 0
SHORTNESS OF BREATH: 0
COUGH: 0
FOCAL WEAKNESS: 0
SINUS PAIN: 1
PALPITATIONS: 0
SENSORY CHANGE: 0
ABDOMINAL PAIN: 0
HEADACHES: 0
MYALGIAS: 0
EYE DISCHARGE: 0

## 2020-05-07 ASSESSMENT — FIBROSIS 4 INDEX: FIB4 SCORE: 1.242668558465542899

## 2020-05-07 NOTE — PROGRESS NOTES
Subjective:      Zakiya Kelly is a 64 y.o. female who presents with Dizziness (pt states she was seen by PCP and is being treated for sinus infection, states dizziness is worse today )            HPI  States has right side facial sinus pressure around eye. Denies vision change numbness/tinlging or face droop. Recently treated for sinus infection by PCP 2 weeks ago and given abx and medrol pack. Has been using allergy with decongestant. Stopped using flonase and no saline nasal rinse. Experiencing vertigo this morning and mild nausea with this. H/o vertigo but seems worse today.Denies head injury or HA, confusion or vomiting, but mild nausea with dizziness.    PMH:  has a past medical history of Anesthesia, Bowel habit changes, CATARACT, HTN (hypertension), Mixed connective tissue disease (HCC), Seasonal allergies, and Snoring.  MEDS:   Current Outpatient Medications:   •  meclizine (ANTIVERT) 25 MG Tab, Take 1 Tab by mouth 3 times a day as needed., Disp: 30 Tab, Rfl: 0  •  ondansetron (ZOFRAN ODT) 4 MG TABLET DISPERSIBLE, Take 1 Tab by mouth every 6 hours as needed., Disp: 10 Tab, Rfl: 0  •  predniSONE (DELTASONE) 10 MG Tab, Take 1 Tab by mouth every day for 5 days., Disp: 5 Tab, Rfl: 0  •  INTRAROSA 6.5 MG INSERT, INSERT 1 APPLICATION VAGINALLY EVERY DAY, Disp: , Rfl: 11  •  hydroxychloroquine (PLAQUENIL) 200 MG Tab, Take 1 Tab by mouth every day., Disp: 90 Tab, Rfl: 2  •  ALPRAZolam (XANAX) 0.25 MG Tab, TAKE 1/2 TO 1 TABLET BY MOUTH 3 TIMES A DAY AS NEEDED FOR ANXIETY. F41.9, Disp: , Rfl: 0  •  tretinoin (RETIN-A) 0.025 % cream, APPLY A PEA SIZED AMOUNT TO FACE TOPICALLY ONCE A DAY AT BEDTIME.IF DRY,THEN USE EVERY OTHER NIGHT, Disp: , Rfl: 5  •  fluticasone (FLONASE) 50 MCG/ACT nasal spray, Spray 1 Spray in nose every day., Disp: , Rfl:   •  NON SPECIFIED, , Disp: , Rfl:   •  TURMERIC PO, Take  by mouth., Disp: , Rfl:   •  Calcium-Magnesium-Vitamin D (CALCIUM 1200+D3 PO), Take  by mouth., Disp: , Rfl:   •   "Cyanocobalamin (VITAMIN B-12 PO), Take  by mouth every day., Disp: , Rfl:   •  Cholecalciferol (VITAMIN D) 2000 UNIT CAPS, Take  by mouth every day., Disp: , Rfl:   •  Vitamin Mixture (VLADISLAV-C PO), Take 1,000 mg by mouth every day., Disp: , Rfl:   •  lisinopril (PRINIVIL) 10 MG TABS, Take 10 mg by mouth every day., Disp: , Rfl:   •  Lactobacillus (PROBIOTIC ACIDOPHILUS PO), Take  by mouth., Disp: , Rfl:   •  Magnesium 500 MG Tab, Take  by mouth., Disp: , Rfl:   •  Acetaminophen (TYLENOL EXTRA STRENGTH PO), Take  by mouth as needed., Disp: , Rfl:   ALLERGIES:   Allergies   Allergen Reactions   • Aspirin      Stomach ulcers   • Codeine Nausea     Does ok with zofran with it   • Sulfa Drugs Hives   • Valium Nausea   • Versed Vomiting   • Vicodin [Hydrocodone-Acetaminophen]      \"makes me crazy\"     SURGHX:   Past Surgical History:   Procedure Laterality Date   • HEMORRHOIDECTOMY  7/14/2017    Procedure: HEMORRHOIDECTOMY ARTERY LIGATION / RECTO ANAL REPAIR;  Surgeon: Eleazar Mckeon M.D.;  Location: SURGERY [facility];  Service:    • BUNIONECTOMY CIRILO Right 9/8/2016    Procedure: BUNIONECTOMY CIRILO - 1ST MPJ;  Surgeon: NARESH AlvarezPRONNIE;  Location: SURGERY SAME DAY [facility];  Service:    • CATARACT PHACO WITH IOL  3/19/2014    Performed by Hai Starr M.D. at SURGERY SAME DAY [facility] ORS   • INTERNAL FIXATION REMOVAL  6/11/2012    Performed by LEONILA RUBY at SURGERY SAME DAY [facility] ORS   • BUNIONECTOMY CIRILO  4/5/2012    Performed by LEONILA RUBY at SURGERY SAME DAY [facility] ORS   • CATARACT PHACO WITH IOL  7/12/2010    Performed by BUTCH MUÑIZ at SURGERY SAME DAY [facility] ORS   • CARPAL TUNNEL RELEASE      lt hand   • OTHER ORTHOPEDIC SURGERY      lt shoulder x 2   • OTHER ORTHOPEDIC SURGERY      bunionectomy bilat   • TONSILLECTOMY       SOCHX:  reports that she has never smoked. She has never used smokeless tobacco. She reports current alcohol use. She reports that " "she does not use drugs.  FH: Family history was reviewed, no pertinent findings to report    Review of Systems   Constitutional: Negative for chills, fever and malaise/fatigue.   HENT: Positive for sinus pain. Negative for congestion, ear pain and sore throat.    Eyes: Negative for blurred vision, double vision, photophobia, pain, discharge and redness.   Respiratory: Negative for cough and shortness of breath.    Cardiovascular: Negative for chest pain, palpitations and orthopnea.   Gastrointestinal: Negative for abdominal pain, constipation, diarrhea, nausea and vomiting.   Musculoskeletal: Negative for myalgias and neck pain.   Skin: Negative for itching and rash.   Neurological: Negative for dizziness, tingling, sensory change, speech change, focal weakness, weakness and headaches.   Endo/Heme/Allergies: Positive for environmental allergies.   All other systems reviewed and are negative.         Objective:     /86 (BP Location: Left arm, Patient Position: Sitting, BP Cuff Size: Adult)   Pulse 71   Temp 36.7 °C (98.1 °F) (Temporal)   Resp 18   Ht 1.575 m (5' 2\")   Wt 75.1 kg (165 lb 9.6 oz)   SpO2 96%   BMI 30.29 kg/m²      Physical Exam  Vitals signs reviewed.   Constitutional:       General: She is awake. She is not in acute distress.     Appearance: Normal appearance. She is well-developed. She is not ill-appearing, toxic-appearing or diaphoretic.   HENT:      Head: Normocephalic.      Right Ear: Ear canal and external ear normal. A middle ear effusion is present.      Left Ear: Ear canal and external ear normal. A middle ear effusion is present.      Nose: Nasal tenderness present. No mucosal edema, congestion or rhinorrhea.      Right Sinus: Maxillary sinus tenderness and frontal sinus tenderness present.      Left Sinus: No maxillary sinus tenderness or frontal sinus tenderness.      Mouth/Throat:      Mouth: Mucous membranes are dry.      Pharynx: Oropharynx is clear. Uvula midline.   Eyes:    "   General: Lids are normal.      Extraocular Movements: Extraocular movements intact.      Conjunctiva/sclera: Conjunctivae normal.      Pupils: Pupils are equal, round, and reactive to light.   Neck:      Musculoskeletal: Normal range of motion and neck supple.   Cardiovascular:      Rate and Rhythm: Normal rate and regular rhythm.   Pulmonary:      Effort: Pulmonary effort is normal. No accessory muscle usage or respiratory distress.      Breath sounds: Normal breath sounds.   Musculoskeletal: Normal range of motion.   Lymphadenopathy:      Cervical: No cervical adenopathy.   Skin:     General: Skin is warm and dry.   Neurological:      Mental Status: She is alert and oriented to person, place, and time.      GCS: GCS eye subscore is 4. GCS verbal subscore is 5. GCS motor subscore is 6.      Cranial Nerves: Cranial nerves are intact.      Sensory: Sensation is intact.      Motor: Motor function is intact.      Coordination: Coordination is intact.      Gait: Gait is intact.   Psychiatric:         Attention and Perception: Attention and perception normal.         Mood and Affect: Mood and affect normal.         Speech: Speech normal.         Behavior: Behavior normal. Behavior is cooperative.         Thought Content: Thought content normal.         Cognition and Memory: Cognition and memory normal.         Judgment: Judgment normal.                 Assessment/Plan:       1. Vertigo    - meclizine (ANTIVERT) 25 MG Tab; Take 1 Tab by mouth 3 times a day as needed.  Dispense: 30 Tab; Refill: 0    2. Nausea    - ondansetron (ZOFRAN ODT) 4 MG TABLET DISPERSIBLE; Take 1 Tab by mouth every 6 hours as needed.  Dispense: 10 Tab; Refill: 0    3. Nasal sinus congestion    - predniSONE (DELTASONE) 10 MG Tab; Take 1 Tab by mouth every day for 5 days.  Dispense: 5 Tab; Refill: 0    4. Fluid level behind tympanic membrane of both ears  D/c decongestant at this time  Increase water intake  Get rest  May use Ibuprofen/Tylenol prn  for any sinus pain or HA  May take longer acting antihistamine for seasonal allergy symptoms prn (without decongestant)  May use saline nasal spray for nasal congestion  May use Flonase for allergen nasal congestion  Monitor for nasal d/c, worse facial pressure, fever, HA, vision change- need re-evaluation

## 2020-05-12 ENCOUNTER — HOSPITAL ENCOUNTER (OUTPATIENT)
Dept: LAB | Facility: MEDICAL CENTER | Age: 65
End: 2020-05-12
Attending: FAMILY MEDICINE
Payer: COMMERCIAL

## 2020-05-12 LAB
FERRITIN SERPL-MCNC: 239 NG/ML (ref 10–291)
IRON SATN MFR SERPL: 45 % (ref 15–55)
IRON SERPL-MCNC: 133 UG/DL (ref 40–170)
TIBC SERPL-MCNC: 296 UG/DL (ref 250–450)
UIBC SERPL-MCNC: 163 UG/DL (ref 110–370)

## 2020-05-12 PROCEDURE — 36415 COLL VENOUS BLD VENIPUNCTURE: CPT

## 2020-05-12 PROCEDURE — 82728 ASSAY OF FERRITIN: CPT

## 2020-05-12 PROCEDURE — 83540 ASSAY OF IRON: CPT

## 2020-05-12 PROCEDURE — 83550 IRON BINDING TEST: CPT

## 2020-05-28 ENCOUNTER — APPOINTMENT (RX ONLY)
Dept: URBAN - METROPOLITAN AREA CLINIC 22 | Facility: CLINIC | Age: 65
Setting detail: DERMATOLOGY
End: 2020-05-28

## 2020-05-28 DIAGNOSIS — L57.0 ACTINIC KERATOSIS: ICD-10-CM

## 2020-05-28 DIAGNOSIS — Z71.89 OTHER SPECIFIED COUNSELING: ICD-10-CM

## 2020-05-28 DIAGNOSIS — L82.1 OTHER SEBORRHEIC KERATOSIS: ICD-10-CM

## 2020-05-28 PROCEDURE — ? COUNSELING

## 2020-05-28 PROCEDURE — 17000 DESTRUCT PREMALG LESION: CPT

## 2020-05-28 PROCEDURE — ? LIQUID NITROGEN

## 2020-05-28 PROCEDURE — 99213 OFFICE O/P EST LOW 20 MIN: CPT | Mod: 25

## 2020-05-28 PROCEDURE — 17003 DESTRUCT PREMALG LES 2-14: CPT

## 2020-05-28 ASSESSMENT — LOCATION DETAILED DESCRIPTION DERM
LOCATION DETAILED: RIGHT CENTRAL BUCCAL CHEEK
LOCATION DETAILED: RIGHT CENTRAL LATERAL NECK
LOCATION DETAILED: LEFT SUPERIOR ANTERIOR NECK
LOCATION DETAILED: RIGHT SUPERIOR FOREHEAD
LOCATION DETAILED: INFERIOR MID FOREHEAD
LOCATION DETAILED: RIGHT LATERAL FOREHEAD
LOCATION DETAILED: LEFT CENTRAL ZYGOMA
LOCATION DETAILED: RIGHT SUPERIOR ANTERIOR NECK
LOCATION DETAILED: LEFT NASAL SIDEWALL
LOCATION DETAILED: LEFT RADIAL DORSAL HAND

## 2020-05-28 ASSESSMENT — LOCATION ZONE DERM
LOCATION ZONE: FACE
LOCATION ZONE: HAND
LOCATION ZONE: NOSE
LOCATION ZONE: NECK

## 2020-05-28 ASSESSMENT — LOCATION SIMPLE DESCRIPTION DERM
LOCATION SIMPLE: LEFT NOSE
LOCATION SIMPLE: RIGHT CHEEK
LOCATION SIMPLE: NECK
LOCATION SIMPLE: RIGHT FOREHEAD
LOCATION SIMPLE: LEFT ZYGOMA
LOCATION SIMPLE: LEFT ANTERIOR NECK
LOCATION SIMPLE: RIGHT ANTERIOR NECK
LOCATION SIMPLE: INFERIOR FOREHEAD
LOCATION SIMPLE: LEFT HAND

## 2020-05-28 NOTE — PROCEDURE: LIQUID NITROGEN
Consent: The patient's consent was obtained including but not limited to risks of crusting, scabbing, blistering, scarring, darker or lighter pigmentary change, recurrence, incomplete removal and infection.
Detail Level: Detailed
Render Post-Care Instructions In Note?: no
Post-Care Instructions: I reviewed with the patient in detail post-care instructions. Patient is to wear sunprotection, and avoid picking at any of the treated lesions. Pt may apply Vaseline to crusted or scabbing areas.
Number Of Freeze-Thaw Cycles: 2 freeze-thaw cycles
Duration Of Freeze Thaw-Cycle (Seconds): 3

## 2020-07-08 ENCOUNTER — APPOINTMENT (RX ONLY)
Dept: URBAN - METROPOLITAN AREA CLINIC 22 | Facility: CLINIC | Age: 65
Setting detail: DERMATOLOGY
End: 2020-07-08

## 2020-07-08 DIAGNOSIS — L72.0 EPIDERMAL CYST: ICD-10-CM

## 2020-07-08 DIAGNOSIS — L30.9 DERMATITIS, UNSPECIFIED: ICD-10-CM | Status: RESOLVING

## 2020-07-08 DIAGNOSIS — Z71.89 OTHER SPECIFIED COUNSELING: ICD-10-CM

## 2020-07-08 DIAGNOSIS — L73.8 OTHER SPECIFIED FOLLICULAR DISORDERS: ICD-10-CM

## 2020-07-08 PROCEDURE — ? DIAGNOSIS COMMENT

## 2020-07-08 PROCEDURE — ? COUNSELING

## 2020-07-08 PROCEDURE — ? ADDITIONAL NOTES

## 2020-07-08 PROCEDURE — ? PRESCRIPTION

## 2020-07-08 PROCEDURE — 99214 OFFICE O/P EST MOD 30 MIN: CPT

## 2020-07-08 RX ORDER — HYDROCORTISONE 25 MG/G
1 CREAM TOPICAL BID PRN
Qty: 1 | Refills: 1 | Status: ERX | COMMUNITY
Start: 2020-07-08

## 2020-07-08 RX ORDER — CRISABOROLE 20 MG/G
1 OINTMENT TOPICAL
Qty: 1 | Refills: 1 | Status: ERX | COMMUNITY
Start: 2020-07-08

## 2020-07-08 RX ADMIN — CRISABOROLE 1: 20 OINTMENT TOPICAL at 00:00

## 2020-07-08 RX ADMIN — HYDROCORTISONE 1: 25 CREAM TOPICAL at 00:00

## 2020-07-08 ASSESSMENT — LOCATION DETAILED DESCRIPTION DERM
LOCATION DETAILED: RIGHT FOREHEAD
LOCATION DETAILED: RIGHT MEDIAL MALAR CHEEK
LOCATION DETAILED: LEFT MEDIAL MALAR CHEEK
LOCATION DETAILED: RIGHT MEDIAL SUPERIOR EYELID
LOCATION DETAILED: RIGHT CENTRAL LATERAL NECK

## 2020-07-08 ASSESSMENT — LOCATION SIMPLE DESCRIPTION DERM
LOCATION SIMPLE: NECK
LOCATION SIMPLE: RIGHT FOREHEAD
LOCATION SIMPLE: RIGHT SUPERIOR EYELID
LOCATION SIMPLE: LEFT CHEEK
LOCATION SIMPLE: RIGHT CHEEK

## 2020-07-08 ASSESSMENT — LOCATION ZONE DERM
LOCATION ZONE: FACE
LOCATION ZONE: EYELID
LOCATION ZONE: NECK

## 2020-07-08 NOTE — HPI: SKIN LESION
Is This A New Presentation, Or A Follow-Up?: Skin Lesions
What Type Of Note Output Would You Prefer (Optional)?: Standard Output
How Severe Is Your Skin Lesion?: moderate
Has Your Skin Lesion Been Treated?: not been treated
Additional History: States Velazcoa Mac TRAORE has prescribed her eucrisa in the past. She has applied eucrisa along with retin-A to these lesions this  past week and believes they have gone down in size.

## 2020-07-08 NOTE — PROCEDURE: ADDITIONAL NOTES
Additional Notes: Dermatitis is resolving with Eucrisa.  She requested a prescription.  I will also prescribe Hydrocortisone 2.5% cream in case her insurance will not cover the Eucrisa.
Detail Level: Generalized

## 2020-07-21 ENCOUNTER — HOSPITAL ENCOUNTER (OUTPATIENT)
Dept: LAB | Facility: MEDICAL CENTER | Age: 65
End: 2020-07-21
Attending: FAMILY MEDICINE
Payer: COMMERCIAL

## 2020-07-21 LAB
ALBUMIN SERPL BCP-MCNC: 4.4 G/DL (ref 3.2–4.9)
ALBUMIN/GLOB SERPL: 1.6 G/DL
ALP SERPL-CCNC: 75 U/L (ref 30–99)
ALT SERPL-CCNC: 15 U/L (ref 2–50)
ANION GAP SERPL CALC-SCNC: 15 MMOL/L (ref 7–16)
AST SERPL-CCNC: 20 U/L (ref 12–45)
BASOPHILS # BLD AUTO: 0.5 % (ref 0–1.8)
BASOPHILS # BLD: 0.03 K/UL (ref 0–0.12)
BILIRUB SERPL-MCNC: 0.6 MG/DL (ref 0.1–1.5)
BUN SERPL-MCNC: 25 MG/DL (ref 8–22)
CALCIUM SERPL-MCNC: 9.9 MG/DL (ref 8.5–10.5)
CHLORIDE SERPL-SCNC: 98 MMOL/L (ref 96–112)
CHOLEST SERPL-MCNC: 204 MG/DL (ref 100–199)
CO2 SERPL-SCNC: 24 MMOL/L (ref 20–33)
CREAT SERPL-MCNC: 1.02 MG/DL (ref 0.5–1.4)
EOSINOPHIL # BLD AUTO: 0.04 K/UL (ref 0–0.51)
EOSINOPHIL NFR BLD: 0.7 % (ref 0–6.9)
ERYTHROCYTE [DISTWIDTH] IN BLOOD BY AUTOMATED COUNT: 43.5 FL (ref 35.9–50)
FASTING STATUS PATIENT QL REPORTED: NORMAL
GLOBULIN SER CALC-MCNC: 2.7 G/DL (ref 1.9–3.5)
GLUCOSE SERPL-MCNC: 84 MG/DL (ref 65–99)
HCT VFR BLD AUTO: 43.4 % (ref 37–47)
HDLC SERPL-MCNC: 83 MG/DL
HGB BLD-MCNC: 14.2 G/DL (ref 12–16)
IMM GRANULOCYTES # BLD AUTO: 0.01 K/UL (ref 0–0.11)
IMM GRANULOCYTES NFR BLD AUTO: 0.2 % (ref 0–0.9)
LDLC SERPL CALC-MCNC: 110 MG/DL
LYMPHOCYTES # BLD AUTO: 1.33 K/UL (ref 1–4.8)
LYMPHOCYTES NFR BLD: 23.4 % (ref 22–41)
MCH RBC QN AUTO: 31.9 PG (ref 27–33)
MCHC RBC AUTO-ENTMCNC: 32.7 G/DL (ref 33.6–35)
MCV RBC AUTO: 97.5 FL (ref 81.4–97.8)
MONOCYTES # BLD AUTO: 0.41 K/UL (ref 0–0.85)
MONOCYTES NFR BLD AUTO: 7.2 % (ref 0–13.4)
NEUTROPHILS # BLD AUTO: 3.86 K/UL (ref 2–7.15)
NEUTROPHILS NFR BLD: 68 % (ref 44–72)
NRBC # BLD AUTO: 0 K/UL
NRBC BLD-RTO: 0 /100 WBC
PLATELET # BLD AUTO: 245 K/UL (ref 164–446)
PMV BLD AUTO: 10.2 FL (ref 9–12.9)
POTASSIUM SERPL-SCNC: 5 MMOL/L (ref 3.6–5.5)
PROT SERPL-MCNC: 7.1 G/DL (ref 6–8.2)
RBC # BLD AUTO: 4.45 M/UL (ref 4.2–5.4)
SODIUM SERPL-SCNC: 137 MMOL/L (ref 135–145)
TRIGL SERPL-MCNC: 54 MG/DL (ref 0–149)
WBC # BLD AUTO: 5.7 K/UL (ref 4.8–10.8)

## 2020-07-21 PROCEDURE — 85025 COMPLETE CBC W/AUTO DIFF WBC: CPT

## 2020-07-21 PROCEDURE — 80053 COMPREHEN METABOLIC PANEL: CPT

## 2020-07-21 PROCEDURE — 80061 LIPID PANEL: CPT

## 2020-07-21 PROCEDURE — 36415 COLL VENOUS BLD VENIPUNCTURE: CPT

## 2020-08-13 ENCOUNTER — APPOINTMENT (RX ONLY)
Dept: URBAN - METROPOLITAN AREA CLINIC 22 | Facility: CLINIC | Age: 65
Setting detail: DERMATOLOGY
End: 2020-08-13

## 2020-08-13 DIAGNOSIS — Z71.89 OTHER SPECIFIED COUNSELING: ICD-10-CM

## 2020-08-13 DIAGNOSIS — L30.0 NUMMULAR DERMATITIS: ICD-10-CM | Status: STABLE

## 2020-08-13 DIAGNOSIS — M35.1 OTHER OVERLAP SYNDROMES: ICD-10-CM

## 2020-08-13 DIAGNOSIS — L82.0 INFLAMED SEBORRHEIC KERATOSIS: ICD-10-CM

## 2020-08-13 DIAGNOSIS — L82.1 OTHER SEBORRHEIC KERATOSIS: ICD-10-CM

## 2020-08-13 DIAGNOSIS — L72.0 EPIDERMAL CYST: ICD-10-CM

## 2020-08-13 DIAGNOSIS — L73.8 OTHER SPECIFIED FOLLICULAR DISORDERS: ICD-10-CM

## 2020-08-13 PROCEDURE — ? COUNSELING

## 2020-08-13 PROCEDURE — ? DIAGNOSIS COMMENT

## 2020-08-13 PROCEDURE — ? PRESCRIPTION

## 2020-08-13 PROCEDURE — 99214 OFFICE O/P EST MOD 30 MIN: CPT | Mod: 25

## 2020-08-13 PROCEDURE — ? ADDITIONAL NOTES

## 2020-08-13 PROCEDURE — ? LIQUID NITROGEN

## 2020-08-13 PROCEDURE — 17110 DESTRUCTION B9 LES UP TO 14: CPT

## 2020-08-13 RX ORDER — CRISABOROLE 20 MG/G
1 OINTMENT TOPICAL
Qty: 1 | Refills: 1 | Status: ERX

## 2020-08-13 ASSESSMENT — LOCATION SIMPLE DESCRIPTION DERM
LOCATION SIMPLE: ABDOMEN
LOCATION SIMPLE: NECK
LOCATION SIMPLE: RIGHT FOREHEAD
LOCATION SIMPLE: LEFT ANTERIOR NECK
LOCATION SIMPLE: RIGHT SUPERIOR EYELID
LOCATION SIMPLE: RIGHT CHEEK
LOCATION SIMPLE: RIGHT ANTERIOR NECK
LOCATION SIMPLE: CHEST
LOCATION SIMPLE: LEFT CHEEK

## 2020-08-13 ASSESSMENT — LOCATION ZONE DERM
LOCATION ZONE: NECK
LOCATION ZONE: FACE
LOCATION ZONE: TRUNK
LOCATION ZONE: EYELID

## 2020-08-13 ASSESSMENT — LOCATION DETAILED DESCRIPTION DERM
LOCATION DETAILED: RIGHT FOREHEAD
LOCATION DETAILED: RIGHT SUPERIOR ANTERIOR NECK
LOCATION DETAILED: RIGHT LATERAL FOREHEAD
LOCATION DETAILED: SUBXIPHOID
LOCATION DETAILED: RIGHT MEDIAL MALAR CHEEK
LOCATION DETAILED: RIGHT CENTRAL LATERAL NECK
LOCATION DETAILED: LEFT MEDIAL SUPERIOR CHEST
LOCATION DETAILED: LEFT MEDIAL MALAR CHEEK
LOCATION DETAILED: RIGHT MEDIAL SUPERIOR EYELID
LOCATION DETAILED: LEFT SUPERIOR ANTERIOR NECK

## 2020-08-13 NOTE — PROCEDURE: ADDITIONAL NOTES
Additional Notes: Dermatitis responded nicely with Eucrisa.  She requested a prescription.  Will see if  her insurance will cover the Eucrisa.
Detail Level: Generalized

## 2020-08-13 NOTE — PROCEDURE: LIQUID NITROGEN
Add 52 Modifier (Optional): no
Consent: The patient's consent was obtained including but not limited to risks of crusting, scabbing, blistering, scarring, darker or lighter pigmentary change, recurrence, incomplete removal and infection.
Number Of Freeze-Thaw Cycles: 3 freeze-thaw cycles
Duration Of Freeze Thaw-Cycle (Seconds): 3
Post-Care Instructions: I reviewed with the patient in detail post-care instructions. Patient is to wear sunprotection, and avoid picking at any of the treated lesions. Pt may apply Vaseline to crusted or scabbing areas.
Medical Necessity Clause: This procedure was medically necessary because the lesions that were treated were:
Medical Necessity Information: It is in your best interest to select a reason for this procedure from the list below. All of these items fulfill various CMS LCD requirements except the new and changing color options.
Detail Level: Detailed

## 2020-09-21 ENCOUNTER — HOSPITAL ENCOUNTER (OUTPATIENT)
Facility: MEDICAL CENTER | Age: 65
End: 2020-09-21
Attending: NURSE PRACTITIONER
Payer: COMMERCIAL

## 2020-09-21 ENCOUNTER — HOSPITAL ENCOUNTER (OUTPATIENT)
Dept: RADIOLOGY | Facility: MEDICAL CENTER | Age: 65
End: 2020-09-21
Attending: NURSE PRACTITIONER
Payer: COMMERCIAL

## 2020-09-21 ENCOUNTER — OFFICE VISIT (OUTPATIENT)
Dept: URGENT CARE | Facility: PHYSICIAN GROUP | Age: 65
End: 2020-09-21
Payer: COMMERCIAL

## 2020-09-21 VITALS
RESPIRATION RATE: 14 BRPM | HEIGHT: 62 IN | BODY MASS INDEX: 30.36 KG/M2 | TEMPERATURE: 97.9 F | OXYGEN SATURATION: 100 % | SYSTOLIC BLOOD PRESSURE: 130 MMHG | DIASTOLIC BLOOD PRESSURE: 84 MMHG | WEIGHT: 165 LBS | HEART RATE: 86 BPM

## 2020-09-21 DIAGNOSIS — R10.13 EPIGASTRIC PAIN: ICD-10-CM

## 2020-09-21 DIAGNOSIS — R10.84 GENERALIZED ABDOMINAL PAIN: ICD-10-CM

## 2020-09-21 DIAGNOSIS — R19.7 DIARRHEA, UNSPECIFIED TYPE: ICD-10-CM

## 2020-09-21 LAB
ALBUMIN SERPL BCP-MCNC: 4.3 G/DL (ref 3.2–4.9)
ALBUMIN/GLOB SERPL: 1.6 G/DL
ALP SERPL-CCNC: 77 U/L (ref 30–99)
ALT SERPL-CCNC: 13 U/L (ref 2–50)
ANION GAP SERPL CALC-SCNC: 13 MMOL/L (ref 7–16)
AST SERPL-CCNC: 18 U/L (ref 12–45)
BASOPHILS # BLD AUTO: 0.1 % (ref 0–1.8)
BASOPHILS # BLD: 0.01 K/UL (ref 0–0.12)
BILIRUB SERPL-MCNC: 0.5 MG/DL (ref 0.1–1.5)
BUN SERPL-MCNC: 15 MG/DL (ref 8–22)
CALCIUM SERPL-MCNC: 9.6 MG/DL (ref 8.5–10.5)
CHLORIDE SERPL-SCNC: 103 MMOL/L (ref 96–112)
CO2 SERPL-SCNC: 22 MMOL/L (ref 20–33)
CREAT SERPL-MCNC: 0.82 MG/DL (ref 0.5–1.4)
EOSINOPHIL # BLD AUTO: 0.02 K/UL (ref 0–0.51)
EOSINOPHIL NFR BLD: 0.3 % (ref 0–6.9)
ERYTHROCYTE [DISTWIDTH] IN BLOOD BY AUTOMATED COUNT: 45.5 FL (ref 35.9–50)
FASTING STATUS PATIENT QL REPORTED: NORMAL
GLOBULIN SER CALC-MCNC: 2.7 G/DL (ref 1.9–3.5)
GLUCOSE SERPL-MCNC: 80 MG/DL (ref 65–99)
HCT VFR BLD AUTO: 45.2 % (ref 37–47)
HGB BLD-MCNC: 15 G/DL (ref 12–16)
IMM GRANULOCYTES # BLD AUTO: 0.01 K/UL (ref 0–0.11)
IMM GRANULOCYTES NFR BLD AUTO: 0.1 % (ref 0–0.9)
LYMPHOCYTES # BLD AUTO: 0.99 K/UL (ref 1–4.8)
LYMPHOCYTES NFR BLD: 13.1 % (ref 22–41)
MCH RBC QN AUTO: 32.1 PG (ref 27–33)
MCHC RBC AUTO-ENTMCNC: 33.2 G/DL (ref 33.6–35)
MCV RBC AUTO: 96.8 FL (ref 81.4–97.8)
MONOCYTES # BLD AUTO: 0.54 K/UL (ref 0–0.85)
MONOCYTES NFR BLD AUTO: 7.2 % (ref 0–13.4)
NEUTROPHILS # BLD AUTO: 5.96 K/UL (ref 2–7.15)
NEUTROPHILS NFR BLD: 79.2 % (ref 44–72)
NRBC # BLD AUTO: 0 K/UL
NRBC BLD-RTO: 0 /100 WBC
PLATELET # BLD AUTO: 237 K/UL (ref 164–446)
PMV BLD AUTO: 9.9 FL (ref 9–12.9)
POTASSIUM SERPL-SCNC: 4.5 MMOL/L (ref 3.6–5.5)
PROT SERPL-MCNC: 7 G/DL (ref 6–8.2)
RBC # BLD AUTO: 4.67 M/UL (ref 4.2–5.4)
SODIUM SERPL-SCNC: 138 MMOL/L (ref 135–145)
WBC # BLD AUTO: 7.5 K/UL (ref 4.8–10.8)

## 2020-09-21 PROCEDURE — 85025 COMPLETE CBC W/AUTO DIFF WBC: CPT

## 2020-09-21 PROCEDURE — 80053 COMPREHEN METABOLIC PANEL: CPT

## 2020-09-21 PROCEDURE — 99213 OFFICE O/P EST LOW 20 MIN: CPT | Performed by: NURSE PRACTITIONER

## 2020-09-21 PROCEDURE — 76700 US EXAM ABDOM COMPLETE: CPT

## 2020-09-21 ASSESSMENT — ENCOUNTER SYMPTOMS
ORTHOPNEA: 0
HEARTBURN: 0
WEAKNESS: 0
SWEATS: 0
NAUSEA: 0
CHILLS: 0
SHORTNESS OF BREATH: 0
WEIGHT LOSS: 0
DIARRHEA: 1
ABDOMINAL PAIN: 1
ARTHRALGIAS: 0
FLATUS: 1
BLOATING: 0
HEADACHES: 0
VOMITING: 0
COUGH: 0
SORE THROAT: 0
MYALGIAS: 0
FEVER: 0

## 2020-09-21 ASSESSMENT — FIBROSIS 4 INDEX: FIB4 SCORE: 1.39

## 2020-09-21 NOTE — PROGRESS NOTES
Subjective:   Zakiya Kelly is a 65 y.o. female who presents for Diarrhea (x 3 days) and Abdominal Pain       Diarrhea   This is a new problem. The current episode started in the past 7 days. The problem occurs more than 10 times per day. The problem has been unchanged. The stool consistency is described as watery. Associated symptoms include abdominal pain and increased flatus. Pertinent negatives include no arthralgias, bloating, chills, coughing, fever, headaches, myalgias, sweats, vomiting or weight loss. Exacerbated by: eating/drinking. She has tried bismuth subsalicylate for the symptoms. The treatment provided no relief.   Abdominal Pain  Associated symptoms include diarrhea and flatus. Pertinent negatives include no arthralgias, dysuria, fever, headaches, myalgias, nausea, vomiting or weight loss.     Pt presents for evaluation of a new problem, reports 3-day history of severe diarrhea with severe generalized abdominal pain.  Reports pain and diarrhea that is worse with any type of food or drink intake.  Denies any nausea, vomiting, fever, chills, recent foods, medications, recent travel.  Denies any known ill contacts.    Review of Systems   Constitutional: Negative for chills, fever, malaise/fatigue and weight loss.   HENT: Negative for congestion and sore throat.    Respiratory: Negative for cough and shortness of breath.    Cardiovascular: Negative for chest pain, orthopnea and leg swelling.   Gastrointestinal: Positive for abdominal pain, diarrhea and flatus. Negative for bloating, heartburn, nausea and vomiting.   Genitourinary: Negative for dysuria.   Musculoskeletal: Negative for arthralgias and myalgias.   Skin: Negative for rash.   Neurological: Negative for weakness and headaches.   All other systems reviewed and are negative.      MEDS:   Current Outpatient Medications:   •  INTRAROSA 6.5 MG INSERT, INSERT 1 APPLICATION VAGINALLY EVERY DAY, Disp: , Rfl: 11  •  hydroxychloroquine (PLAQUENIL)  "200 MG Tab, Take 1 Tab by mouth every day., Disp: 90 Tab, Rfl: 2  •  ALPRAZolam (XANAX) 0.25 MG Tab, TAKE 1/2 TO 1 TABLET BY MOUTH 3 TIMES A DAY AS NEEDED FOR ANXIETY. F41.9, Disp: , Rfl: 0  •  tretinoin (RETIN-A) 0.025 % cream, APPLY A PEA SIZED AMOUNT TO FACE TOPICALLY ONCE A DAY AT BEDTIME.IF DRY,THEN USE EVERY OTHER NIGHT, Disp: , Rfl: 5  •  fluticasone (FLONASE) 50 MCG/ACT nasal spray, Spray 1 Spray in nose every day., Disp: , Rfl:   •  NON SPECIFIED, , Disp: , Rfl:   •  TURMERIC PO, Take  by mouth., Disp: , Rfl:   •  Cyanocobalamin (VITAMIN B-12 PO), Take  by mouth every day., Disp: , Rfl:   •  Cholecalciferol (VITAMIN D) 2000 UNIT CAPS, Take  by mouth every day., Disp: , Rfl:   •  Vitamin Mixture (VLADISLAV-C PO), Take 1,000 mg by mouth every day., Disp: , Rfl:   •  Acetaminophen (TYLENOL EXTRA STRENGTH PO), Take  by mouth as needed., Disp: , Rfl:   •  lisinopril (PRINIVIL) 10 MG TABS, Take 10 mg by mouth every day., Disp: , Rfl:   ALLERGIES:   Allergies   Allergen Reactions   • Aspirin      Stomach ulcers   • Codeine Nausea     Does ok with zofran with it   • Sulfa Drugs Hives   • Valium Nausea   • Versed Vomiting   • Vicodin [Hydrocodone-Acetaminophen]      \"makes me crazy\"       Patient's PMH, SocHx, SurgHx, FamHx, Drug allergies and medications were reviewed.     Objective:   /84 (BP Location: Left arm, Patient Position: Sitting, BP Cuff Size: Adult)   Pulse 86   Temp 36.6 °C (97.9 °F) (Temporal)   Resp 14   Ht 1.575 m (5' 2\")   Wt 74.8 kg (165 lb)   SpO2 100%   BMI 30.18 kg/m²     Physical Exam  Vitals signs and nursing note reviewed.   Constitutional:       General: She is awake.      Appearance: Normal appearance. She is well-developed and normal weight.   HENT:      Head: Normocephalic and atraumatic.      Right Ear: Tympanic membrane, ear canal and external ear normal.      Left Ear: Tympanic membrane, ear canal and external ear normal.      Nose: Nose normal.      Mouth/Throat:      Mouth: " Mucous membranes are moist.      Pharynx: Oropharynx is clear.   Eyes:      Extraocular Movements: Extraocular movements intact.      Conjunctiva/sclera: Conjunctivae normal.      Pupils: Pupils are equal, round, and reactive to light.   Neck:      Musculoskeletal: Full passive range of motion without pain, normal range of motion and neck supple.      Thyroid: No thyromegaly.      Trachea: Trachea normal.   Cardiovascular:      Rate and Rhythm: Normal rate and regular rhythm.      Pulses: Normal pulses.      Heart sounds: Normal heart sounds, S1 normal and S2 normal.   Pulmonary:      Effort: Pulmonary effort is normal. No respiratory distress.      Breath sounds: Normal breath sounds. No wheezing, rhonchi or rales.   Abdominal:      General: Abdomen is flat. Bowel sounds are increased.      Palpations: Abdomen is soft.      Tenderness: There is abdominal tenderness in the right upper quadrant, epigastric area and left upper quadrant. There is no right CVA tenderness, left CVA tenderness, guarding or rebound.   Musculoskeletal: Normal range of motion.   Lymphadenopathy:      Cervical: No cervical adenopathy.   Skin:     General: Skin is warm and dry.      Capillary Refill: Capillary refill takes less than 2 seconds.   Neurological:      General: No focal deficit present.      Mental Status: She is alert and oriented to person, place, and time.      Gait: Gait is intact.   Psychiatric:         Attention and Perception: Attention and perception normal.         Mood and Affect: Mood normal.         Speech: Speech normal.         Behavior: Behavior normal. Behavior is cooperative.         Thought Content: Thought content normal.         Judgment: Judgment normal.         Assessment/Plan:   Assessment    1. Generalized abdominal pain  - CBC WITH DIFFERENTIAL; Future  - Comp Metabolic Panel; Future  - US-ABDOMEN COMPLETE SURVEY; Future    2. Epigastric pain  - US-ABDOMEN COMPLETE SURVEY; Future    3. Diarrhea, unspecified  type  - US-ABDOMEN COMPLETE SURVEY; Future      Ordered STAT abd US and labs, called patient with results, as well as message via [employer] (no acute findings).  Supportive care options also discussed.  Discussed the use of OTC medications as per package directions on a PRN basis.  Differential diagnosis, natural history, and indications for immediate follow-up were discussed.     Return to urgent care clinic or PCP if current symptoms do not improve and/or worsening symptoms occur. Advised of signs and symptoms which would warrant further evaluation and /or emergent evaluation in ER.  All questions answered and the patient agrees to the plan of care.

## 2020-09-24 ENCOUNTER — HOSPITAL ENCOUNTER (OUTPATIENT)
Dept: LAB | Facility: MEDICAL CENTER | Age: 65
End: 2020-09-24
Attending: FAMILY MEDICINE
Payer: COMMERCIAL

## 2020-09-24 LAB
C DIFF DNA SPEC QL NAA+PROBE: NEGATIVE
C DIFF TOX GENS STL QL NAA+PROBE: NEGATIVE
WBC STL QL MICRO: NORMAL

## 2020-09-24 PROCEDURE — 89125 SPECIMEN FAT STAIN: CPT

## 2020-09-24 PROCEDURE — 87329 GIARDIA AG IA: CPT

## 2020-09-24 PROCEDURE — 87045 FECES CULTURE AEROBIC BACT: CPT

## 2020-09-24 PROCEDURE — 87493 C DIFF AMPLIFIED PROBE: CPT

## 2020-09-24 PROCEDURE — 87899 AGENT NOS ASSAY W/OPTIC: CPT

## 2020-09-24 PROCEDURE — 87328 CRYPTOSPORIDIUM AG IA: CPT

## 2020-09-24 PROCEDURE — 89055 LEUKOCYTE ASSESSMENT FECAL: CPT

## 2020-09-24 PROCEDURE — 87046 STOOL CULTR AEROBIC BACT EA: CPT

## 2020-09-25 LAB
BODY FLD TYPE: NORMAL
E COLI SXT1+2 STL IA: NORMAL
FAT FLD QL: NORMAL
G LAMBLIA+C PARVUM AG STL QL RAPID: NORMAL
SIGNIFICANT IND 70042: NORMAL
SIGNIFICANT IND 70042: NORMAL
SITE SITE: NORMAL
SITE SITE: NORMAL
SOURCE SOURCE: NORMAL
SOURCE SOURCE: NORMAL

## 2020-09-27 LAB
BACTERIA STL CULT: NORMAL
E COLI SXT1+2 STL IA: NORMAL
SIGNIFICANT IND 70042: NORMAL
SITE SITE: NORMAL
SOURCE SOURCE: NORMAL

## 2020-11-23 ENCOUNTER — HOSPITAL ENCOUNTER (OUTPATIENT)
Dept: RADIOLOGY | Facility: MEDICAL CENTER | Age: 65
End: 2020-11-23
Attending: PODIATRIST
Payer: COMMERCIAL

## 2020-11-23 ENCOUNTER — HOSPITAL ENCOUNTER (OUTPATIENT)
Dept: LAB | Facility: MEDICAL CENTER | Age: 65
End: 2020-11-23
Attending: INTERNAL MEDICINE
Payer: COMMERCIAL

## 2020-11-23 DIAGNOSIS — M79.674 PAIN IN TOE OF RIGHT FOOT: ICD-10-CM

## 2020-11-23 DIAGNOSIS — M79.671 RIGHT FOOT PAIN: ICD-10-CM

## 2020-11-23 LAB
ALBUMIN SERPL BCP-MCNC: 4.6 G/DL (ref 3.2–4.9)
ALBUMIN/GLOB SERPL: 1.8 G/DL
ALP SERPL-CCNC: 75 U/L (ref 30–99)
ALT SERPL-CCNC: 14 U/L (ref 2–50)
ANION GAP SERPL CALC-SCNC: 8 MMOL/L (ref 7–16)
APPEARANCE UR: CLEAR
AST SERPL-CCNC: 15 U/L (ref 12–45)
BASOPHILS # BLD AUTO: 0.3 % (ref 0–1.8)
BASOPHILS # BLD: 0.02 K/UL (ref 0–0.12)
BILIRUB SERPL-MCNC: 0.3 MG/DL (ref 0.1–1.5)
BILIRUB UR QL STRIP.AUTO: NEGATIVE
BUN SERPL-MCNC: 23 MG/DL (ref 8–22)
C3 SERPL-MCNC: 109.6 MG/DL (ref 87–200)
C4 SERPL-MCNC: 27.5 MG/DL (ref 19–52)
CALCIUM SERPL-MCNC: 9.8 MG/DL (ref 8.5–10.5)
CHLORIDE SERPL-SCNC: 99 MMOL/L (ref 96–112)
CO2 SERPL-SCNC: 28 MMOL/L (ref 20–33)
COLOR UR: YELLOW
CREAT SERPL-MCNC: 0.91 MG/DL (ref 0.5–1.4)
CREAT UR-MCNC: 29.17 MG/DL
EOSINOPHIL # BLD AUTO: 0.02 K/UL (ref 0–0.51)
EOSINOPHIL NFR BLD: 0.3 % (ref 0–6.9)
ERYTHROCYTE [DISTWIDTH] IN BLOOD BY AUTOMATED COUNT: 47.6 FL (ref 35.9–50)
GLOBULIN SER CALC-MCNC: 2.6 G/DL (ref 1.9–3.5)
GLUCOSE SERPL-MCNC: 89 MG/DL (ref 65–99)
GLUCOSE UR STRIP.AUTO-MCNC: NEGATIVE MG/DL
HCT VFR BLD AUTO: 42.3 % (ref 37–47)
HGB BLD-MCNC: 13.8 G/DL (ref 12–16)
IMM GRANULOCYTES # BLD AUTO: 0.03 K/UL (ref 0–0.11)
IMM GRANULOCYTES NFR BLD AUTO: 0.4 % (ref 0–0.9)
KETONES UR STRIP.AUTO-MCNC: NEGATIVE MG/DL
LEUKOCYTE ESTERASE UR QL STRIP.AUTO: NEGATIVE
LYMPHOCYTES # BLD AUTO: 1.06 K/UL (ref 1–4.8)
LYMPHOCYTES NFR BLD: 13.5 % (ref 22–41)
MCH RBC QN AUTO: 31.8 PG (ref 27–33)
MCHC RBC AUTO-ENTMCNC: 32.6 G/DL (ref 33.6–35)
MCV RBC AUTO: 97.5 FL (ref 81.4–97.8)
MICRO URNS: NORMAL
MONOCYTES # BLD AUTO: 0.3 K/UL (ref 0–0.85)
MONOCYTES NFR BLD AUTO: 3.8 % (ref 0–13.4)
NEUTROPHILS # BLD AUTO: 6.45 K/UL (ref 2–7.15)
NEUTROPHILS NFR BLD: 81.7 % (ref 44–72)
NITRITE UR QL STRIP.AUTO: NEGATIVE
NRBC # BLD AUTO: 0 K/UL
NRBC BLD-RTO: 0 /100 WBC
PH UR STRIP.AUTO: 6 [PH] (ref 5–8)
PLATELET # BLD AUTO: 267 K/UL (ref 164–446)
PMV BLD AUTO: 9.8 FL (ref 9–12.9)
POTASSIUM SERPL-SCNC: 4.4 MMOL/L (ref 3.6–5.5)
PROT SERPL-MCNC: 7.2 G/DL (ref 6–8.2)
PROT UR QL STRIP: NEGATIVE MG/DL
PROT UR-MCNC: <4 MG/DL (ref 0–15)
PROT/CREAT UR: NORMAL MG/G (ref 10–107)
RBC # BLD AUTO: 4.34 M/UL (ref 4.2–5.4)
RBC UR QL AUTO: NEGATIVE
SODIUM SERPL-SCNC: 135 MMOL/L (ref 135–145)
SP GR UR STRIP.AUTO: 1.01
UROBILINOGEN UR STRIP.AUTO-MCNC: 0.2 MG/DL
WBC # BLD AUTO: 7.9 K/UL (ref 4.8–10.8)

## 2020-11-23 PROCEDURE — 36415 COLL VENOUS BLD VENIPUNCTURE: CPT

## 2020-11-23 PROCEDURE — 84156 ASSAY OF PROTEIN URINE: CPT

## 2020-11-23 PROCEDURE — 85025 COMPLETE CBC W/AUTO DIFF WBC: CPT

## 2020-11-23 PROCEDURE — 81003 URINALYSIS AUTO W/O SCOPE: CPT

## 2020-11-23 PROCEDURE — 82570 ASSAY OF URINE CREATININE: CPT

## 2020-11-23 PROCEDURE — 80053 COMPREHEN METABOLIC PANEL: CPT

## 2020-11-23 PROCEDURE — 86160 COMPLEMENT ANTIGEN: CPT

## 2020-11-23 PROCEDURE — 86225 DNA ANTIBODY NATIVE: CPT

## 2020-11-23 PROCEDURE — 73630 X-RAY EXAM OF FOOT: CPT | Mod: RT

## 2020-11-25 LAB — DSDNA AB TITR SER CLIF: NORMAL {TITER}

## 2021-02-02 ENCOUNTER — RX ONLY (OUTPATIENT)
Age: 66
Setting detail: RX ONLY
End: 2021-02-02

## 2021-02-02 ENCOUNTER — APPOINTMENT (RX ONLY)
Dept: URBAN - METROPOLITAN AREA CLINIC 4 | Facility: CLINIC | Age: 66
Setting detail: DERMATOLOGY
End: 2021-02-02

## 2021-02-02 DIAGNOSIS — L81.4 OTHER MELANIN HYPERPIGMENTATION: ICD-10-CM

## 2021-02-02 DIAGNOSIS — L57.0 ACTINIC KERATOSIS: ICD-10-CM | Status: WELL CONTROLLED

## 2021-02-02 DIAGNOSIS — Z71.89 OTHER SPECIFIED COUNSELING: ICD-10-CM

## 2021-02-02 DIAGNOSIS — D22 MELANOCYTIC NEVI: ICD-10-CM

## 2021-02-02 PROBLEM — D22.9 MELANOCYTIC NEVI, UNSPECIFIED: Status: ACTIVE | Noted: 2021-02-02

## 2021-02-02 PROCEDURE — 17000 DESTRUCT PREMALG LESION: CPT

## 2021-02-02 PROCEDURE — ? COUNSELING

## 2021-02-02 PROCEDURE — 99212 OFFICE O/P EST SF 10 MIN: CPT | Mod: 25

## 2021-02-02 PROCEDURE — ? LIQUID NITROGEN

## 2021-02-02 RX ORDER — TRETIONIN 0.5 MG/G
1 CREAM TOPICAL QHS
Qty: 1 | Refills: 5 | Status: ERX

## 2021-02-02 ASSESSMENT — LOCATION SIMPLE DESCRIPTION DERM: LOCATION SIMPLE: LEFT CHEEK

## 2021-02-02 ASSESSMENT — LOCATION DETAILED DESCRIPTION DERM
LOCATION DETAILED: LEFT INFERIOR CENTRAL MALAR CHEEK
LOCATION DETAILED: LEFT INFERIOR LATERAL MALAR CHEEK

## 2021-02-02 ASSESSMENT — LOCATION ZONE DERM: LOCATION ZONE: FACE

## 2021-05-03 ENCOUNTER — PRE-ADMISSION TESTING (OUTPATIENT)
Dept: ADMISSIONS | Facility: MEDICAL CENTER | Age: 66
End: 2021-05-03
Attending: PODIATRIST
Payer: COMMERCIAL

## 2021-05-03 ASSESSMENT — FIBROSIS 4 INDEX: FIB4 SCORE: 0.99

## 2021-05-11 ENCOUNTER — PRE-ADMISSION TESTING (OUTPATIENT)
Dept: ADMISSIONS | Facility: MEDICAL CENTER | Age: 66
End: 2021-05-11
Attending: PODIATRIST
Payer: COMMERCIAL

## 2021-05-11 DIAGNOSIS — Z01.812 PRE-OPERATIVE LABORATORY EXAMINATION: ICD-10-CM

## 2021-05-11 LAB — COVID ORDER STATUS COVID19: NORMAL

## 2021-05-11 PROCEDURE — U0005 INFEC AGEN DETEC AMPLI PROBE: HCPCS

## 2021-05-11 PROCEDURE — U0003 INFECTIOUS AGENT DETECTION BY NUCLEIC ACID (DNA OR RNA); SEVERE ACUTE RESPIRATORY SYNDROME CORONAVIRUS 2 (SARS-COV-2) (CORONAVIRUS DISEASE [COVID-19]), AMPLIFIED PROBE TECHNIQUE, MAKING USE OF HIGH THROUGHPUT TECHNOLOGIES AS DESCRIBED BY CMS-2020-01-R: HCPCS

## 2021-05-11 PROCEDURE — C9803 HOPD COVID-19 SPEC COLLECT: HCPCS

## 2021-05-12 LAB
SARS-COV-2 RNA RESP QL NAA+PROBE: NOTDETECTED
SPECIMEN SOURCE: NORMAL

## 2021-05-13 NOTE — H&P
DATE OF ADMISSION:  05/14/2021     CHIEF COMPLAINT:  Painful bone spur on the right second toe.     MEDICATIONS:  The patient is currently taking the following prescription   medications lisinopril, Plaquenil 200 mg, alprazolam p.r.n., Intrarosa hormone   replacement therapy, Flonase p.r.n.     ALLERGIES:  THE PATIENT HAS KNOWN DRUG ALLERGIES TO SULFA AND ASPIRIN.     PAST SURGICAL HISTORY:  Cataracts in 2010 and 2014, left shoulder surgery in   2003, bunionectomy right foot 30 years ago, redo bunionectomy in 2019, also   bunionectomy on the left foot and Justen bunionectomy performed on the right   foot in 2013, tonsils and adenoids as a child, vaginal laser rejuvenation done   in 2020.     PAST MEDICAL HISTORY:  The patient denies any complications or any other   hospitalizations.  She does relate history of nausea from the anesthesia.     REVIEW OF SYSTEMS:   SKIN:  Mixed connective tissue disorder.  EYES:  Cataracts treated.  NOSE:  Chronic allergies.  CARDIAC:  Hypertension, controlled.  GASTROINTESTINAL:  History of IBS and ulcers.  BONE AND JOINT:  Arthritis, right knee, and foot surgeries as related above.  All other systems do appear unremarkable.     SOCIAL HISTORY:  Alcohol:  Approximately 6 drinks per week.     FAMILY HISTORY:  Unremarkable.     HISTORY OF PRESENT ILLNESS: This patient has had a painful spur, which is   causing a corn to develop.  She has been treated with routine periodic care;   however, it is getting unmanageable at this point and she is requesting   surgical correction.     PHYSICAL EXAMINATION:   VITAL SIGNS:  Height 5 feet, 2 inches; weight 170 pounds.  /99, pulse   rate 68, O2 sat is 97.  EXTREMITIES:  Peripheral vascular status is intact.  Skin color is pink.    Temperature is warm.  Dorsalis pedis and posterior tibial are both palpable.    Capillary filling time is immediate.  MUSCULOSKELETAL:  There is a palpable prominence at the medial aspect of the   second toe with a  corresponding hyperkeratotic lesion in the same area.     DIAGNOSTIC STUDIES:  Radiographs does show an exostosis on the right second   toe in the area of the pain, which is at the proximal interphalangeal joint.    It does appear that she has already had an arthroplasty performed there and   there is no contracture there.     ASSESSMENT:  Exostosis, right second toe proximal and middle phalanges.     PLAN:  The patient is scheduled for exostectomy on the medial aspect of the   right second and proximal phalangeal joints.  The patient signed consent for   surgery.  She was given a narcotic drug consent and information form.  A   [place] prescription monitoring program was done on the patient and it did show   her alprazolam.  She was given a surgical foot information form, which   includes alternative methods of treatment, general indications for procedures   and possible complications.  She was given a drawing of the anticipated   procedure, also given both oral and written preoperative as well as   postoperative instructions.  She will be wearing a surgical shoe for all   walking and standing.  She was given prescriptions for postop pain   medications, which have been sent electronically.  She is scheduled for   tomorrow at the [facility].        ______________________________  ANGELA MOISE/JESUS/SHAYNA    DD:  05/13/2021 11:40  DT:  05/13/2021 12:16    Job#:  [id number]    CC:RONALD MA MD

## 2021-05-14 ENCOUNTER — HOSPITAL ENCOUNTER (OUTPATIENT)
Facility: MEDICAL CENTER | Age: 66
End: 2021-05-14
Attending: PODIATRIST | Admitting: PODIATRIST
Payer: COMMERCIAL

## 2021-05-14 ENCOUNTER — ANESTHESIA (OUTPATIENT)
Dept: SURGERY | Facility: MEDICAL CENTER | Age: 66
End: 2021-05-14
Payer: COMMERCIAL

## 2021-05-14 ENCOUNTER — ANESTHESIA EVENT (OUTPATIENT)
Dept: SURGERY | Facility: MEDICAL CENTER | Age: 66
End: 2021-05-14
Payer: COMMERCIAL

## 2021-05-14 ENCOUNTER — APPOINTMENT (OUTPATIENT)
Dept: RADIOLOGY | Facility: MEDICAL CENTER | Age: 66
End: 2021-05-14
Attending: PODIATRIST
Payer: COMMERCIAL

## 2021-05-14 VITALS
HEIGHT: 63 IN | HEART RATE: 62 BPM | SYSTOLIC BLOOD PRESSURE: 145 MMHG | OXYGEN SATURATION: 100 % | RESPIRATION RATE: 16 BRPM | DIASTOLIC BLOOD PRESSURE: 61 MMHG | BODY MASS INDEX: 30.39 KG/M2 | WEIGHT: 171.52 LBS | TEMPERATURE: 97.9 F

## 2021-05-14 LAB
ANION GAP SERPL CALC-SCNC: 8 MMOL/L (ref 7–16)
BUN SERPL-MCNC: 23 MG/DL (ref 8–22)
CALCIUM SERPL-MCNC: 9.3 MG/DL (ref 8.5–10.5)
CHLORIDE SERPL-SCNC: 104 MMOL/L (ref 96–112)
CO2 SERPL-SCNC: 26 MMOL/L (ref 20–33)
CREAT SERPL-MCNC: 0.9 MG/DL (ref 0.5–1.4)
GLUCOSE SERPL-MCNC: 81 MG/DL (ref 65–99)
POTASSIUM SERPL-SCNC: 4.3 MMOL/L (ref 3.6–5.5)
SODIUM SERPL-SCNC: 138 MMOL/L (ref 135–145)

## 2021-05-14 PROCEDURE — 80048 BASIC METABOLIC PNL TOTAL CA: CPT

## 2021-05-14 PROCEDURE — 700101 HCHG RX REV CODE 250: Performed by: PODIATRIST

## 2021-05-14 PROCEDURE — 73620 X-RAY EXAM OF FOOT: CPT | Mod: RT

## 2021-05-14 PROCEDURE — 500881 HCHG PACK, EXTREMITY: Performed by: PODIATRIST

## 2021-05-14 PROCEDURE — 160009 HCHG ANES TIME/MIN: Performed by: PODIATRIST

## 2021-05-14 PROCEDURE — A6454 SELF-ADHER BAND W>=3" <5"/YD: HCPCS | Performed by: PODIATRIST

## 2021-05-14 PROCEDURE — 501838 HCHG SUTURE GENERAL: Performed by: PODIATRIST

## 2021-05-14 PROCEDURE — 700111 HCHG RX REV CODE 636 W/ 250 OVERRIDE (IP): Performed by: PODIATRIST

## 2021-05-14 PROCEDURE — A9270 NON-COVERED ITEM OR SERVICE: HCPCS | Performed by: PODIATRIST

## 2021-05-14 PROCEDURE — 160025 RECOVERY II MINUTES (STATS): Performed by: PODIATRIST

## 2021-05-14 PROCEDURE — 160002 HCHG RECOVERY MINUTES (STAT): Performed by: PODIATRIST

## 2021-05-14 PROCEDURE — 160048 HCHG OR STATISTICAL LEVEL 1-5: Performed by: PODIATRIST

## 2021-05-14 PROCEDURE — 160038 HCHG SURGERY MINUTES - EA ADDL 1 MIN LEVEL 2: Performed by: PODIATRIST

## 2021-05-14 PROCEDURE — 160035 HCHG PACU - 1ST 60 MINS PHASE I: Performed by: PODIATRIST

## 2021-05-14 PROCEDURE — 700105 HCHG RX REV CODE 258: Performed by: PODIATRIST

## 2021-05-14 PROCEDURE — 160027 HCHG SURGERY MINUTES - 1ST 30 MINS LEVEL 2: Performed by: PODIATRIST

## 2021-05-14 PROCEDURE — 700111 HCHG RX REV CODE 636 W/ 250 OVERRIDE (IP): Performed by: ANESTHESIOLOGY

## 2021-05-14 PROCEDURE — 160046 HCHG PACU - 1ST 60 MINS PHASE II: Performed by: PODIATRIST

## 2021-05-14 RX ORDER — MORPHINE SULFATE 4 MG/ML
2 INJECTION, SOLUTION INTRAMUSCULAR; INTRAVENOUS
Status: DISCONTINUED | OUTPATIENT
Start: 2021-05-14 | End: 2021-05-14 | Stop reason: HOSPADM

## 2021-05-14 RX ORDER — MORPHINE SULFATE 10 MG/ML
5 INJECTION, SOLUTION INTRAMUSCULAR; INTRAVENOUS
Status: DISCONTINUED | OUTPATIENT
Start: 2021-05-14 | End: 2021-05-14 | Stop reason: HOSPADM

## 2021-05-14 RX ORDER — DEXAMETHASONE SODIUM PHOSPHATE 4 MG/ML
INJECTION, SOLUTION INTRA-ARTICULAR; INTRALESIONAL; INTRAMUSCULAR; INTRAVENOUS; SOFT TISSUE
Status: DISCONTINUED | OUTPATIENT
Start: 2021-05-14 | End: 2021-05-14 | Stop reason: HOSPADM

## 2021-05-14 RX ORDER — LIDOCAINE HYDROCHLORIDE 10 MG/ML
INJECTION, SOLUTION EPIDURAL; INFILTRATION; INTRACAUDAL; PERINEURAL
Status: DISCONTINUED
Start: 2021-05-14 | End: 2021-05-14

## 2021-05-14 RX ORDER — HALOPERIDOL 5 MG/ML
1 INJECTION INTRAMUSCULAR
Status: DISCONTINUED | OUTPATIENT
Start: 2021-05-14 | End: 2021-05-14 | Stop reason: HOSPADM

## 2021-05-14 RX ORDER — OXYCODONE HYDROCHLORIDE AND ACETAMINOPHEN 5; 325 MG/1; MG/1
1 TABLET ORAL
Status: DISCONTINUED | OUTPATIENT
Start: 2021-05-14 | End: 2021-05-14 | Stop reason: HOSPADM

## 2021-05-14 RX ORDER — LIDOCAINE HYDROCHLORIDE 10 MG/ML
INJECTION, SOLUTION INFILTRATION; PERINEURAL
Status: DISCONTINUED | OUTPATIENT
Start: 2021-05-14 | End: 2021-05-14 | Stop reason: HOSPADM

## 2021-05-14 RX ORDER — ONDANSETRON 2 MG/ML
4 INJECTION INTRAMUSCULAR; INTRAVENOUS
Status: DISCONTINUED | OUTPATIENT
Start: 2021-05-14 | End: 2021-05-14 | Stop reason: HOSPADM

## 2021-05-14 RX ORDER — DIPHENHYDRAMINE HYDROCHLORIDE 50 MG/ML
12.5 INJECTION INTRAMUSCULAR; INTRAVENOUS
Status: DISCONTINUED | OUTPATIENT
Start: 2021-05-14 | End: 2021-05-14 | Stop reason: HOSPADM

## 2021-05-14 RX ORDER — EPINEPHRINE 1 MG/ML(1)
AMPUL (ML) INJECTION
Status: DISCONTINUED
Start: 2021-05-14 | End: 2021-05-14 | Stop reason: HOSPADM

## 2021-05-14 RX ORDER — KETOROLAC TROMETHAMINE 30 MG/ML
INJECTION, SOLUTION INTRAMUSCULAR; INTRAVENOUS PRN
Status: DISCONTINUED | OUTPATIENT
Start: 2021-05-14 | End: 2021-05-14 | Stop reason: SURG

## 2021-05-14 RX ORDER — LABETALOL HYDROCHLORIDE 5 MG/ML
5 INJECTION, SOLUTION INTRAVENOUS
Status: DISCONTINUED | OUTPATIENT
Start: 2021-05-14 | End: 2021-05-14 | Stop reason: HOSPADM

## 2021-05-14 RX ORDER — MORPHINE SULFATE 4 MG/ML
1 INJECTION, SOLUTION INTRAMUSCULAR; INTRAVENOUS
Status: DISCONTINUED | OUTPATIENT
Start: 2021-05-14 | End: 2021-05-14 | Stop reason: HOSPADM

## 2021-05-14 RX ORDER — DEXAMETHASONE SODIUM PHOSPHATE 4 MG/ML
INJECTION, SOLUTION INTRA-ARTICULAR; INTRALESIONAL; INTRAMUSCULAR; INTRAVENOUS; SOFT TISSUE
Status: DISCONTINUED
Start: 2021-05-14 | End: 2021-05-14

## 2021-05-14 RX ORDER — BUPIVACAINE HYDROCHLORIDE 5 MG/ML
INJECTION, SOLUTION EPIDURAL; INTRACAUDAL
Status: DISCONTINUED
Start: 2021-05-14 | End: 2021-05-14

## 2021-05-14 RX ORDER — SODIUM CHLORIDE, SODIUM LACTATE, POTASSIUM CHLORIDE, CALCIUM CHLORIDE 600; 310; 30; 20 MG/100ML; MG/100ML; MG/100ML; MG/100ML
INJECTION, SOLUTION INTRAVENOUS CONTINUOUS
Status: DISCONTINUED | OUTPATIENT
Start: 2021-05-14 | End: 2021-05-14

## 2021-05-14 RX ORDER — CEFAZOLIN SODIUM 1 G/3ML
INJECTION, POWDER, FOR SOLUTION INTRAMUSCULAR; INTRAVENOUS PRN
Status: DISCONTINUED | OUTPATIENT
Start: 2021-05-14 | End: 2021-05-14 | Stop reason: SURG

## 2021-05-14 RX ORDER — OXYCODONE HYDROCHLORIDE AND ACETAMINOPHEN 5; 325 MG/1; MG/1
2 TABLET ORAL
Status: DISCONTINUED | OUTPATIENT
Start: 2021-05-14 | End: 2021-05-14 | Stop reason: HOSPADM

## 2021-05-14 RX ORDER — HYDRALAZINE HYDROCHLORIDE 20 MG/ML
5 INJECTION INTRAMUSCULAR; INTRAVENOUS
Status: DISCONTINUED | OUTPATIENT
Start: 2021-05-14 | End: 2021-05-14 | Stop reason: HOSPADM

## 2021-05-14 RX ORDER — ONDANSETRON 2 MG/ML
INJECTION INTRAMUSCULAR; INTRAVENOUS PRN
Status: DISCONTINUED | OUTPATIENT
Start: 2021-05-14 | End: 2021-05-14 | Stop reason: SURG

## 2021-05-14 RX ORDER — SODIUM CHLORIDE, SODIUM LACTATE, POTASSIUM CHLORIDE, CALCIUM CHLORIDE 600; 310; 30; 20 MG/100ML; MG/100ML; MG/100ML; MG/100ML
INJECTION, SOLUTION INTRAVENOUS CONTINUOUS
Status: DISCONTINUED | OUTPATIENT
Start: 2021-05-14 | End: 2021-05-14 | Stop reason: HOSPADM

## 2021-05-14 RX ORDER — BUPIVACAINE HYDROCHLORIDE 5 MG/ML
INJECTION, SOLUTION EPIDURAL; INTRACAUDAL
Status: DISCONTINUED | OUTPATIENT
Start: 2021-05-14 | End: 2021-05-14 | Stop reason: HOSPADM

## 2021-05-14 RX ORDER — MIDAZOLAM HYDROCHLORIDE 1 MG/ML
INJECTION INTRAMUSCULAR; INTRAVENOUS PRN
Status: DISCONTINUED | OUTPATIENT
Start: 2021-05-14 | End: 2021-05-14 | Stop reason: SURG

## 2021-05-14 RX ORDER — BUPIVACAINE HYDROCHLORIDE 2.5 MG/ML
INJECTION, SOLUTION EPIDURAL; INFILTRATION; INTRACAUDAL
Status: DISCONTINUED
Start: 2021-05-14 | End: 2021-05-14 | Stop reason: HOSPADM

## 2021-05-14 RX ADMIN — POVIDONE IODINE 15 ML: 100 SOLUTION TOPICAL at 07:00

## 2021-05-14 RX ADMIN — FENTANYL CITRATE 50 MCG: 50 INJECTION, SOLUTION INTRAMUSCULAR; INTRAVENOUS at 07:59

## 2021-05-14 RX ADMIN — MIDAZOLAM HYDROCHLORIDE 2 MG: 1 INJECTION, SOLUTION INTRAMUSCULAR; INTRAVENOUS at 07:54

## 2021-05-14 RX ADMIN — KETOROLAC TROMETHAMINE 15 MG: 30 INJECTION, SOLUTION INTRAMUSCULAR at 08:26

## 2021-05-14 RX ADMIN — PROPOFOL 100 MCG/KG/MIN: 10 INJECTION, EMULSION INTRAVENOUS at 07:54

## 2021-05-14 RX ADMIN — CEFAZOLIN 2 G: 330 INJECTION, POWDER, FOR SOLUTION INTRAMUSCULAR; INTRAVENOUS at 07:54

## 2021-05-14 RX ADMIN — SODIUM CHLORIDE, POTASSIUM CHLORIDE, SODIUM LACTATE AND CALCIUM CHLORIDE: 600; 310; 30; 20 INJECTION, SOLUTION INTRAVENOUS at 07:51

## 2021-05-14 RX ADMIN — ONDANSETRON 4 MG: 2 INJECTION INTRAMUSCULAR; INTRAVENOUS at 08:00

## 2021-05-14 RX ADMIN — FENTANYL CITRATE 50 MCG: 50 INJECTION, SOLUTION INTRAMUSCULAR; INTRAVENOUS at 07:54

## 2021-05-14 ASSESSMENT — PAIN DESCRIPTION - PAIN TYPE
TYPE: SURGICAL PAIN

## 2021-05-14 ASSESSMENT — FIBROSIS 4 INDEX: FIB4 SCORE: 0.99

## 2021-05-14 ASSESSMENT — PAIN SCALES - GENERAL: PAIN_LEVEL: 0

## 2021-05-14 NOTE — OR SURGEON
Immediate Post OP Note     PreOp Diagnosis: Exostosis 2nd middle phalange and 2nd proximal phalange      PostOp Diagnosis: same      Procedure(s):  EXCISION, EXOSTOSIS - 2ND TOE PROXIMAL AND MIDDLE PHALANGE. - Wound Class: Clean    Surgeon(s):  Jessica Dinero D.P.M.    Anesthesiologist/Type of Anesthesia:  Anesthesiologist: Jona Allen M.D./Sedation    Surgical Staff:  Circulator: Marita Alexandra R.N.  Scrub Person: Julia Hill    Specimens removed if any:  * No specimens in log *    Estimated Blood Loss: less than 3cc    Findings: routine    Complications: none        5/14/2021 8:41 AM Jessica Dinero D.P.M.

## 2021-05-14 NOTE — ANESTHESIA TIME REPORT
Anesthesia Start and Stop Event Times     Date Time Event    5/14/2021 0724 Ready for Procedure     0751 Anesthesia Start        Responsible Staff  05/14/21    Name Role Begin End    August W SANJU Allen Anesth 0751         Preop Diagnosis (Free Text):  Pre-op Diagnosis     PAINFUL RIGHT TOES, EXOSTOSIS ON RIGHT 2ND TOE AND 3RD TOE        Preop Diagnosis (Codes):    Post op Diagnosis  Exostosis of right foot      Premium Reason  Non-Premium    Comments:

## 2021-05-14 NOTE — OP REPORT
DATE OF SERVICE:  05/14/2021     PREOPERATIVE DIAGNOSES:  Exostectomy right second middle phalanx and   exostectomy, right second proximal phalanx.     POSTOPERATIVE DIAGNOSES:  Exostectomy right second middle phalanx and   exostectomy, right second proximal phalanx.     PROCEDURE:  Exostectomy, right second proximal phalanx and right second middle   phalanx.     SURGEON:  Jessica Dinero DPM     ANESTHESIA:  Local MAC (local by surgeon consisting of preoperatively 7 mL of   1% lidocaine plain, intraoperatively 7 mL 0.5 Marcaine plain.     ESTIMATED BLOOD LOSS:  Less than 3 mL.     INJECTABLES:  Besides local anesthetic, 0.5 mL dexamethasone phosphate.     HEMOSTASIS:  No electrocautery.     TOURNIQUET:  No tourniquet was necessary.     INDICATIONS:  Please see dictated H and P for specifics.  This patient has had   pain in her toe, which has not responded to conservative treatment.  She is   now requesting surgical correction.     PROCEDURE:  After proper identification was made, the patient was brought to   the operating room and placed on the table in supine position.  The patient   was then placed under sedation.  A local block was then performed in the   second ray.  Following the onset of anesthesia, the foot was then prepped   aseptically and draped in the usual sterile technique.  An intraoperative   injection of 0.5% Marcaine plain was then performed.  Identification of the   area was marked.  A linear incision was placed over the exostosis.  The   incision was deepened via sharp and blunt dissection with care taking to   preserve vital structures.  Using a periosteal elevator, identification of the   exostoses was made.  Then, using a power rasp, the exostosis on the proximal   phalanx was used first, then the exostosis on the middle phalanx was removed.    The area was then flushed with copious amounts of sterile saline.  Closure   was with 4-0 Vicryl in the capsular tissues and the extensor tendon,  5-0 nylon   simple interrupted sutures were placed on the incision.  Sterile compressive   dressing consisting of saline soaked gauze, fluffs, Orin, and Coban were   placed.  The patient tolerated the procedure well.  She left the operating   room with all vital signs stable and vascular status intact.  The patient is   going to follow up with me on Monday for postop dressing change.        ______________________________  ANGELA MOISE    DD:  05/14/2021 08:54  DT:  05/14/2021 09:31    Job#:  [id number]

## 2021-05-14 NOTE — ANESTHESIA POSTPROCEDURE EVALUATION
Patient: Zakiya Kelly    Procedure Summary     Date: 05/14/21 Room / Location: [facility] ROOM 22 / SURGERY SAME DAY [facility]    Anesthesia Start: 0751 Anesthesia Stop:     Procedure: EXCISION, EXOSTOSIS - 2ND TOE PROXIMAL AND MIDDLE PHALANGE. (Right Toe) Diagnosis: (PAINFUL RIGHT TOES, EXOSTOSIS ON RIGHT 2ND TOE AND 3RD TOE)    Surgeons: Jessica Dinero D.P.M. Responsible Provider: Jona Allen M.D.    Anesthesia Type: MAC ASA Status: 2          Final Anesthesia Type: MAC  Last vitals  BP   Blood Pressure : 151/76    Temp   36.5 °C (97.7 °F)    Pulse   76   Resp   19    SpO2   99 %      Anesthesia Post Evaluation    Patient location during evaluation: PACU  Patient participation: complete - patient participated  Level of consciousness: awake and alert  Pain score: 0    Airway patency: patent  Anesthetic complications: no  Cardiovascular status: hemodynamically stable  Respiratory status: acceptable and face mask  Hydration status: euvolemic    PONV: none          No complications documented.     Nurse Pain Score: 0 (NPRS)

## 2021-05-14 NOTE — ANESTHESIA PREPROCEDURE EVALUATION
Relevant Problems   CARDIAC   (positive) Internal hemorrhoids without mention of complication       Physical Exam    Airway   Mallampati: II  TM distance: >3 FB  Neck ROM: full       Cardiovascular - normal exam  Rhythm: regular  Rate: normal  (-) murmur     Dental - normal exam           Pulmonary - normal exam  Breath sounds clear to auscultation     Abdominal   (+) obese     Neurological - normal exam                 Anesthesia Plan    ASA 2       Plan - MAC               Induction: intravenous    Postoperative Plan: Postoperative administration of opioids is intended.    Pertinent diagnostic labs and testing reviewed    Informed Consent:    Anesthetic plan and risks discussed with patient.    Use of blood products discussed with: patient whom consented to blood products.

## 2021-05-14 NOTE — OR NURSING
0830 Received pt from OR, received report from OR RN and anesthesiologist. Pt awake, VS WNL.     0900 Pt.'s ride called but unable to contact, pt meets criteria for discharge home.     0945 Pt ambulated to BR, gait steady, void x1. Pt has oozing to right foot, reinforced with coban and foot elevated and iced.     1000 Pt.'s  arrived, discharge instructions given, evidence of understanding demonstrated. Pt out to car in WC.

## 2021-05-14 NOTE — DISCHARGE INSTRUCTIONS
ACTIVITY: Rest and take it easy for the first 24 hours.  A responsible adult is recommended to remain with you during that time.  It is normal to feel sleepy.  We encourage you to not do anything that requires balance, judgment or coordination.    MILD FLU-LIKE SYMPTOMS ARE NORMAL. YOU MAY EXPERIENCE GENERALIZED MUSCLE ACHES, THROAT IRRITATION, HEADACHE AND/OR SOME NAUSEA.    FOR 24 HOURS DO NOT:  Drive, operate machinery or run household appliances.  Drink beer or alcoholic beverages.   Make important decisions or sign legal documents.    SPECIAL INSTRUCTIONS: Partial weight bearing--no more then 5 minutes of standing/walking per hours    DIET: To avoid nausea, slowly advance diet as tolerated, avoiding spicy or greasy foods for the first day.  Add more substantial food to your diet according to your physician's instructions.  Babies can be fed formula or breast milk as soon as they are hungry.  INCREASE FLUIDS AND FIBER TO AVOID CONSTIPATION.    SURGICAL DRESSING/BATHING: **Please keep dressing clean and dry*    FOLLOW-UP APPOINTMENT:  A follow-up appointment should be arranged with your doctor in *5/17/21 9:40 am**.    You should CALL YOUR PHYSICIAN if you develop:  Fever greater than 101 degrees F.  Pain not relieved by medication, or persistent nausea or vomiting.  Excessive bleeding (blood soaking through dressing) or unexpected drainage from the wound.  Extreme redness or swelling around the incision site, drainage of pus or foul smelling drainage.  Inability to urinate or empty your bladder within 8 hours.  Problems with breathing or chest pain.    You should call 911 if you develop problems with breathing or chest pain.  If you are unable to contact your doctor or surgical center, you should go to the nearest emergency room or urgent care center.    Physician's telephone #: Dr. Dinero [phone]      If any questions arise, call your doctor.  If your doctor is not available, please feel free to call  the Surgical Center at [phone]. The Contact Center is open Monday through Friday 7AM to 5PM and may speak to a nurse at [phone], or toll free at [phone].     A registered nurse may call you a few days after your surgery to see how you are doing after your procedure.    MEDICATIONS: Resume taking daily medication.  Take prescribed pain medication with food.  If no medication is prescribed, you may take non-aspirin pain medication if needed.  PAIN MEDICATION CAN BE VERY CONSTIPATING.  Take a stool softener or laxative such as senokot, pericolace, or milk of magnesia if needed.    Prescription given for Norco prior to surgery.  Last pain medication given at _______________________________.    If your physician has prescribed pain medication that includes Acetaminophen (Tylenol), do not take additional Acetaminophen (Tylenol) while taking the prescribed medication.    Depression / Suicide Risk    As you are discharged from this [facility] facility, it is important to learn how to keep safe from harming yourself.    Recognize the warning signs:  · Abrupt changes in personality, positive or negative- including increase in energy   · Giving away possessions  · Change in eating patterns- significant weight changes-  positive or negative  · Change in sleeping patterns- unable to sleep or sleeping all the time   · Unwillingness or inability to communicate  · Depression  · Unusual sadness, discouragement and loneliness  · Talk of wanting to die  · Neglect of personal appearance   · Rebelliousness- reckless behavior  · Withdrawal from people/activities they love  · Confusion- inability to concentrate     If you or a loved one observes any of these behaviors or has concerns about self-harm, here's what you can do:  · Talk about it- your feelings and reasons for harming yourself  · Remove any means that you might use to hurt yourself (examples: pills, rope, extension cords, firearm)  · Get professional help  from the community (Mental Health, Substance Abuse, psychological counseling)  · Do not be alone:Call your Safe Contact- someone whom you trust who will be there for you.  · Call your local CRISIS HOTLINE 382-9528 or 895-508-5054  · Call your local Children's Mobile Crisis Response Team [place] (478) 059-5099 or www.Digistrive  · Call the toll free National Suicide Prevention Hotlines   · National Suicide Prevention Lifeline 006-108-YQUN (8532)  · National Hope Line Network 800-SUICIDE (068-2363)

## 2021-05-14 NOTE — ANESTHESIA TIME REPORT
Anesthesia Start and Stop Event Times     Date Time Event    5/14/2021 0724 Ready for Procedure     0751 Anesthesia Start     0832 Anesthesia Stop        Responsible Staff  05/14/21    Name Role Begin End    August W GIOVANNI Allen. Anesth 0751 0832        Preop Diagnosis (Free Text):  Pre-op Diagnosis     PAINFUL RIGHT TOES, EXOSTOSIS ON RIGHT 2ND TOE AND 3RD TOE        Preop Diagnosis (Codes):    Post op Diagnosis  Exostosis of right foot      Premium Reason  Non-Premium    Comments:

## 2021-05-27 ENCOUNTER — HOSPITAL ENCOUNTER (OUTPATIENT)
Dept: LAB | Facility: MEDICAL CENTER | Age: 66
End: 2021-05-27
Attending: INTERNAL MEDICINE
Payer: COMMERCIAL

## 2021-05-27 LAB
ALT SERPL-CCNC: 15 U/L (ref 2–50)
APPEARANCE UR: CLEAR
AST SERPL-CCNC: 16 U/L (ref 12–45)
BASOPHILS # BLD AUTO: 0.3 % (ref 0–1.8)
BASOPHILS # BLD: 0.02 K/UL (ref 0–0.12)
BILIRUB UR QL STRIP.AUTO: NEGATIVE
C3 SERPL-MCNC: 112.2 MG/DL (ref 87–200)
C4 SERPL-MCNC: 31 MG/DL (ref 19–52)
COLOR UR: YELLOW
CREAT SERPL-MCNC: 1.26 MG/DL (ref 0.5–1.4)
CREAT UR-MCNC: 109.69 MG/DL
EOSINOPHIL # BLD AUTO: 0.04 K/UL (ref 0–0.51)
EOSINOPHIL NFR BLD: 0.6 % (ref 0–6.9)
ERYTHROCYTE [DISTWIDTH] IN BLOOD BY AUTOMATED COUNT: 43.8 FL (ref 35.9–50)
GLUCOSE UR STRIP.AUTO-MCNC: NEGATIVE MG/DL
HCT VFR BLD AUTO: 41.6 % (ref 37–47)
HGB BLD-MCNC: 13.6 G/DL (ref 12–16)
IMM GRANULOCYTES # BLD AUTO: 0.01 K/UL (ref 0–0.11)
IMM GRANULOCYTES NFR BLD AUTO: 0.2 % (ref 0–0.9)
KETONES UR STRIP.AUTO-MCNC: NEGATIVE MG/DL
LEUKOCYTE ESTERASE UR QL STRIP.AUTO: NEGATIVE
LYMPHOCYTES # BLD AUTO: 1.31 K/UL (ref 1–4.8)
LYMPHOCYTES NFR BLD: 20.4 % (ref 22–41)
MCH RBC QN AUTO: 32.1 PG (ref 27–33)
MCHC RBC AUTO-ENTMCNC: 32.7 G/DL (ref 33.6–35)
MCV RBC AUTO: 98.1 FL (ref 81.4–97.8)
MICRO URNS: NORMAL
MONOCYTES # BLD AUTO: 0.51 K/UL (ref 0–0.85)
MONOCYTES NFR BLD AUTO: 7.9 % (ref 0–13.4)
NEUTROPHILS # BLD AUTO: 4.53 K/UL (ref 2–7.15)
NEUTROPHILS NFR BLD: 70.6 % (ref 44–72)
NITRITE UR QL STRIP.AUTO: NEGATIVE
NRBC # BLD AUTO: 0 K/UL
NRBC BLD-RTO: 0 /100 WBC
PH UR STRIP.AUTO: 5.5 [PH] (ref 5–8)
PLATELET # BLD AUTO: 209 K/UL (ref 164–446)
PMV BLD AUTO: 10.4 FL (ref 9–12.9)
PROT UR QL STRIP: NEGATIVE MG/DL
PROT UR-MCNC: 4 MG/DL (ref 0–15)
PROT/CREAT UR: 36 MG/G (ref 10–107)
RBC # BLD AUTO: 4.24 M/UL (ref 4.2–5.4)
RBC UR QL AUTO: NEGATIVE
SP GR UR STRIP.AUTO: 1.02
UROBILINOGEN UR STRIP.AUTO-MCNC: 0.2 MG/DL
WBC # BLD AUTO: 6.4 K/UL (ref 4.8–10.8)

## 2021-05-27 PROCEDURE — 86225 DNA ANTIBODY NATIVE: CPT

## 2021-05-27 PROCEDURE — 82570 ASSAY OF URINE CREATININE: CPT

## 2021-05-27 PROCEDURE — 84450 TRANSFERASE (AST) (SGOT): CPT

## 2021-05-27 PROCEDURE — 85025 COMPLETE CBC W/AUTO DIFF WBC: CPT

## 2021-05-27 PROCEDURE — 82565 ASSAY OF CREATININE: CPT

## 2021-05-27 PROCEDURE — 87086 URINE CULTURE/COLONY COUNT: CPT

## 2021-05-27 PROCEDURE — 84460 ALANINE AMINO (ALT) (SGPT): CPT

## 2021-05-27 PROCEDURE — 84156 ASSAY OF PROTEIN URINE: CPT

## 2021-05-27 PROCEDURE — 36415 COLL VENOUS BLD VENIPUNCTURE: CPT

## 2021-05-27 PROCEDURE — 81003 URINALYSIS AUTO W/O SCOPE: CPT

## 2021-05-27 PROCEDURE — 86160 COMPLEMENT ANTIGEN: CPT

## 2021-05-29 LAB
BACTERIA UR CULT: NORMAL
SIGNIFICANT IND 70042: NORMAL
SITE SITE: NORMAL
SOURCE SOURCE: NORMAL

## 2021-05-30 LAB — DSDNA AB TITR SER CLIF: 1 IU (ref 0–24)

## 2021-08-18 ENCOUNTER — APPOINTMENT (RX ONLY)
Dept: URBAN - METROPOLITAN AREA CLINIC 4 | Facility: CLINIC | Age: 66
Setting detail: DERMATOLOGY
End: 2021-08-18

## 2021-08-18 DIAGNOSIS — L81.4 OTHER MELANIN HYPERPIGMENTATION: ICD-10-CM

## 2021-08-18 DIAGNOSIS — L82.1 OTHER SEBORRHEIC KERATOSIS: ICD-10-CM

## 2021-08-18 PROCEDURE — ? COUNSELING

## 2021-08-18 PROCEDURE — 99212 OFFICE O/P EST SF 10 MIN: CPT

## 2021-08-18 ASSESSMENT — LOCATION ZONE DERM: LOCATION ZONE: FACE

## 2021-08-18 ASSESSMENT — LOCATION SIMPLE DESCRIPTION DERM
LOCATION SIMPLE: RIGHT FOREHEAD
LOCATION SIMPLE: LEFT FOREHEAD

## 2021-08-18 ASSESSMENT — LOCATION DETAILED DESCRIPTION DERM
LOCATION DETAILED: RIGHT LATERAL FOREHEAD
LOCATION DETAILED: LEFT FOREHEAD

## 2021-08-18 NOTE — PROCEDURE: MIPS QUALITY
Quality 130: Documentation Of Current Medications In The Medical Record: Current Medications Documented
Quality 431: Preventive Care And Screening: Unhealthy Alcohol Use - Screening: Patient screened for unhealthy alcohol use using a single question and scores less than 2 times per year
Quality 226: Preventive Care And Screening: Tobacco Use: Screening And Cessation Intervention: Patient screened for tobacco use and is an ex/non-smoker
Quality 110: Preventive Care And Screening: Influenza Immunization: Influenza immunization was not ordered or administered, reason not given
Detail Level: Detailed
Quality 111:Pneumonia Vaccination Status For Older Adults: Pneumococcal Vaccination Previously Received

## 2021-09-20 ENCOUNTER — HOSPITAL ENCOUNTER (OUTPATIENT)
Dept: LAB | Facility: MEDICAL CENTER | Age: 66
End: 2021-09-20
Attending: FAMILY MEDICINE
Payer: COMMERCIAL

## 2021-09-20 ENCOUNTER — HOSPITAL ENCOUNTER (OUTPATIENT)
Dept: LAB | Facility: MEDICAL CENTER | Age: 66
End: 2021-09-20
Attending: INTERNAL MEDICINE
Payer: COMMERCIAL

## 2021-09-20 LAB
ALT SERPL-CCNC: 10 U/L (ref 2–50)
APPEARANCE UR: CLEAR
AST SERPL-CCNC: 14 U/L (ref 12–45)
BASOPHILS # BLD AUTO: 0.5 % (ref 0–1.8)
BASOPHILS # BLD: 0.03 K/UL (ref 0–0.12)
BILIRUB UR QL STRIP.AUTO: NEGATIVE
C3 SERPL-MCNC: 117.8 MG/DL (ref 87–200)
C4 SERPL-MCNC: 32.1 MG/DL (ref 19–52)
CHOLEST SERPL-MCNC: 205 MG/DL (ref 100–199)
COLOR UR: YELLOW
CREAT SERPL-MCNC: 0.92 MG/DL (ref 0.5–1.4)
CREAT UR-MCNC: 59.45 MG/DL
EOSINOPHIL # BLD AUTO: 0.04 K/UL (ref 0–0.51)
EOSINOPHIL NFR BLD: 0.7 % (ref 0–6.9)
ERYTHROCYTE [DISTWIDTH] IN BLOOD BY AUTOMATED COUNT: 42.5 FL (ref 35.9–50)
GLUCOSE UR STRIP.AUTO-MCNC: NEGATIVE MG/DL
HCT VFR BLD AUTO: 40.4 % (ref 37–47)
HDLC SERPL-MCNC: 82 MG/DL
HGB BLD-MCNC: 13.5 G/DL (ref 12–16)
IMM GRANULOCYTES # BLD AUTO: 0.01 K/UL (ref 0–0.11)
IMM GRANULOCYTES NFR BLD AUTO: 0.2 % (ref 0–0.9)
KETONES UR STRIP.AUTO-MCNC: NEGATIVE MG/DL
LDLC SERPL CALC-MCNC: 113 MG/DL
LEUKOCYTE ESTERASE UR QL STRIP.AUTO: NEGATIVE
LYMPHOCYTES # BLD AUTO: 1.44 K/UL (ref 1–4.8)
LYMPHOCYTES NFR BLD: 23.7 % (ref 22–41)
MCH RBC QN AUTO: 31.8 PG (ref 27–33)
MCHC RBC AUTO-ENTMCNC: 33.4 G/DL (ref 33.6–35)
MCV RBC AUTO: 95.3 FL (ref 81.4–97.8)
MICRO URNS: NORMAL
MONOCYTES # BLD AUTO: 0.42 K/UL (ref 0–0.85)
MONOCYTES NFR BLD AUTO: 6.9 % (ref 0–13.4)
NEUTROPHILS # BLD AUTO: 4.14 K/UL (ref 2–7.15)
NEUTROPHILS NFR BLD: 68 % (ref 44–72)
NITRITE UR QL STRIP.AUTO: NEGATIVE
NRBC # BLD AUTO: 0 K/UL
NRBC BLD-RTO: 0 /100 WBC
PH UR STRIP.AUTO: 5.5 [PH] (ref 5–8)
PLATELET # BLD AUTO: 274 K/UL (ref 164–446)
PMV BLD AUTO: 9.9 FL (ref 9–12.9)
PROT UR QL STRIP: NEGATIVE MG/DL
PROT UR-MCNC: <4 MG/DL (ref 0–15)
PROT/CREAT UR: NORMAL MG/G (ref 10–107)
RBC # BLD AUTO: 4.24 M/UL (ref 4.2–5.4)
RBC UR QL AUTO: NEGATIVE
SP GR UR STRIP.AUTO: 1.01
TRIGL SERPL-MCNC: 49 MG/DL (ref 0–149)
UROBILINOGEN UR STRIP.AUTO-MCNC: 0.2 MG/DL
WBC # BLD AUTO: 6.1 K/UL (ref 4.8–10.8)

## 2021-09-20 PROCEDURE — 84450 TRANSFERASE (AST) (SGOT): CPT

## 2021-09-20 PROCEDURE — 81003 URINALYSIS AUTO W/O SCOPE: CPT

## 2021-09-20 PROCEDURE — 36415 COLL VENOUS BLD VENIPUNCTURE: CPT

## 2021-09-20 PROCEDURE — 80061 LIPID PANEL: CPT

## 2021-09-20 PROCEDURE — 82565 ASSAY OF CREATININE: CPT

## 2021-09-20 PROCEDURE — 84156 ASSAY OF PROTEIN URINE: CPT

## 2021-09-20 PROCEDURE — 86160 COMPLEMENT ANTIGEN: CPT | Mod: 91

## 2021-09-20 PROCEDURE — 84460 ALANINE AMINO (ALT) (SGPT): CPT

## 2021-09-20 PROCEDURE — 82570 ASSAY OF URINE CREATININE: CPT

## 2021-09-20 PROCEDURE — 85025 COMPLETE CBC W/AUTO DIFF WBC: CPT

## 2021-09-20 PROCEDURE — 86225 DNA ANTIBODY NATIVE: CPT

## 2021-09-22 LAB — DSDNA AB TITR SER CLIF: 3 IU (ref 0–24)

## 2022-04-20 ENCOUNTER — APPOINTMENT (RX ONLY)
Dept: URBAN - METROPOLITAN AREA CLINIC 176 | Facility: CLINIC | Age: 67
Setting detail: DERMATOLOGY
End: 2022-04-20

## 2022-04-20 VITALS — HEIGHT: 63 IN | WEIGHT: 165 LBS

## 2022-04-20 DIAGNOSIS — D22 MELANOCYTIC NEVI: ICD-10-CM

## 2022-04-20 DIAGNOSIS — L81.4 OTHER MELANIN HYPERPIGMENTATION: ICD-10-CM

## 2022-04-20 DIAGNOSIS — L57.0 ACTINIC KERATOSIS: ICD-10-CM | Status: WORSENING

## 2022-04-20 DIAGNOSIS — L57.8 OTHER SKIN CHANGES DUE TO CHRONIC EXPOSURE TO NONIONIZING RADIATION: ICD-10-CM

## 2022-04-20 DIAGNOSIS — M35.1 OTHER OVERLAP SYNDROMES: ICD-10-CM | Status: WORSENING

## 2022-04-20 DIAGNOSIS — D18.0 HEMANGIOMA: ICD-10-CM

## 2022-04-20 DIAGNOSIS — Z71.89 OTHER SPECIFIED COUNSELING: ICD-10-CM

## 2022-04-20 DIAGNOSIS — L82.1 OTHER SEBORRHEIC KERATOSIS: ICD-10-CM

## 2022-04-20 PROBLEM — D23.72 OTHER BENIGN NEOPLASM OF SKIN OF LEFT LOWER LIMB, INCLUDING HIP: Status: ACTIVE | Noted: 2022-04-20

## 2022-04-20 PROBLEM — D22.71 MELANOCYTIC NEVI OF RIGHT LOWER LIMB, INCLUDING HIP: Status: ACTIVE | Noted: 2022-04-20

## 2022-04-20 PROBLEM — D22.5 MELANOCYTIC NEVI OF TRUNK: Status: ACTIVE | Noted: 2022-04-20

## 2022-04-20 PROBLEM — D22.61 MELANOCYTIC NEVI OF RIGHT UPPER LIMB, INCLUDING SHOULDER: Status: ACTIVE | Noted: 2022-04-20

## 2022-04-20 PROBLEM — D18.01 HEMANGIOMA OF SKIN AND SUBCUTANEOUS TISSUE: Status: ACTIVE | Noted: 2022-04-20

## 2022-04-20 PROBLEM — D22.72 MELANOCYTIC NEVI OF LEFT LOWER LIMB, INCLUDING HIP: Status: ACTIVE | Noted: 2022-04-20

## 2022-04-20 PROCEDURE — ? PRESCRIPTION

## 2022-04-20 PROCEDURE — 99204 OFFICE O/P NEW MOD 45 MIN: CPT | Mod: 25

## 2022-04-20 PROCEDURE — ? SUNSCREEN RECOMMENDATIONS

## 2022-04-20 PROCEDURE — ? PRESCRIPTION MEDICATION MANAGEMENT

## 2022-04-20 PROCEDURE — ? PHOTO-DOCUMENTATION

## 2022-04-20 PROCEDURE — ? COUNSELING

## 2022-04-20 PROCEDURE — ? LIQUID NITROGEN

## 2022-04-20 PROCEDURE — 17000 DESTRUCT PREMALG LESION: CPT

## 2022-04-20 RX ORDER — TIRBANIBULIN 10 MG/G
OINTMENT TOPICAL
Qty: 5 | Refills: 0 | Status: ERX

## 2022-04-20 ASSESSMENT — LOCATION DETAILED DESCRIPTION DERM
LOCATION DETAILED: LEFT RADIAL DORSAL HAND
LOCATION DETAILED: LEFT ANTERIOR DISTAL UPPER ARM
LOCATION DETAILED: RIGHT ANTERIOR DISTAL UPPER ARM
LOCATION DETAILED: LEFT PROXIMAL POSTERIOR THIGH
LOCATION DETAILED: LEFT INFERIOR MEDIAL FOREHEAD
LOCATION DETAILED: RIGHT ANTERIOR PROXIMAL UPPER ARM
LOCATION DETAILED: SUPERIOR LUMBAR SPINE
LOCATION DETAILED: LEFT INFERIOR ANTERIOR NECK
LOCATION DETAILED: NASAL SUPRATIP
LOCATION DETAILED: RIGHT MEDIAL HEEL
LOCATION DETAILED: INFERIOR THORACIC SPINE
LOCATION DETAILED: RIGHT DISTAL POSTERIOR THIGH
LOCATION DETAILED: LEFT MEDIAL GREAT TOE
LOCATION DETAILED: SUPERIOR THORACIC SPINE
LOCATION DETAILED: RIGHT RIB CAGE

## 2022-04-20 ASSESSMENT — LOCATION SIMPLE DESCRIPTION DERM
LOCATION SIMPLE: LEFT POSTERIOR THIGH
LOCATION SIMPLE: LEFT ANTERIOR NECK
LOCATION SIMPLE: NOSE
LOCATION SIMPLE: LEFT UPPER ARM
LOCATION SIMPLE: LEFT GREAT TOE
LOCATION SIMPLE: LOWER BACK
LOCATION SIMPLE: RIGHT FOOT
LOCATION SIMPLE: ABDOMEN
LOCATION SIMPLE: LEFT HAND
LOCATION SIMPLE: LEFT FOREHEAD
LOCATION SIMPLE: UPPER BACK
LOCATION SIMPLE: RIGHT UPPER ARM
LOCATION SIMPLE: RIGHT POSTERIOR THIGH

## 2022-04-20 ASSESSMENT — LOCATION ZONE DERM
LOCATION ZONE: LEG
LOCATION ZONE: NECK
LOCATION ZONE: TOE
LOCATION ZONE: TRUNK
LOCATION ZONE: HAND
LOCATION ZONE: ARM
LOCATION ZONE: FEET
LOCATION ZONE: FACE
LOCATION ZONE: NOSE

## 2022-04-20 ASSESSMENT — SEVERITY ASSESSMENT: SEVERITY: MILD TO MODERATE

## 2022-04-20 ASSESSMENT — PAIN INTENSITY VAS: HOW INTENSE IS YOUR PAIN 0 BEING NO PAIN, 10 BEING THE MOST SEVERE PAIN POSSIBLE?: NO PAIN

## 2022-04-20 NOTE — PROCEDURE: PRESCRIPTION MEDICATION MANAGEMENT
Continue Regimen: Clobetasol as directed by prescribing physician.
Detail Level: Simple
Plan: Pt was reassured.
Render In Strict Bullet Format?: No
Plan: Pt was reassured.\\nStrict photo protection.
Initiate Treatment: Klisyri 1 % topical ointment in packet Quantity: 5.0 Packet  Days Supply: 30 Sig: Apply to the nose daily times 5 days

## 2022-04-20 NOTE — PROCEDURE: PHOTO-DOCUMENTATION
Detail Level: Detailed
Photo Preface (Leave Blank If You Do Not Want): Photo was taken today for monitoring purposes.

## 2022-04-20 NOTE — PROCEDURE: COUNSELING
Detail Level: Generalized
Detail Level: Zone
Detail Level: Detailed
Detail Level: Simple
Patient Specific Counseling (Will Not Stick From Patient To Patient): Patient sees rheumatology for maintenance of mixed connective tissue disease with skin manifestation- uses clobetasol prn from rheumatology

## 2022-04-20 NOTE — PROCEDURE: LIQUID NITROGEN
Render Post-Care Instructions In Note?: no
Number Of Freeze-Thaw Cycles: 1 freeze-thaw cycle
Detail Level: Simple
Consent: The patient's consent was obtained including but not limited to risks of crusting, scabbing, blistering, scarring, darker or lighter pigmentary change, recurrence, incomplete removal and infection.
Show Aperture Variable?: Yes
Aperture Size (Optional): C
Post-Care Instructions: I reviewed with the patient in detail post-care instructions. Patient is to wear sunprotection, and avoid picking at any of the treated lesions. Pt may apply Vaseline to crusted or scabbing areas.
Duration Of Freeze Thaw-Cycle (Seconds): 10

## 2022-06-29 ENCOUNTER — APPOINTMENT (RX ONLY)
Dept: URBAN - METROPOLITAN AREA CLINIC 176 | Facility: CLINIC | Age: 67
Setting detail: DERMATOLOGY
End: 2022-06-29

## 2022-06-29 DIAGNOSIS — L57.0 ACTINIC KERATOSIS: ICD-10-CM

## 2022-06-29 PROCEDURE — ? COUNSELING

## 2022-06-29 ASSESSMENT — LOCATION ZONE DERM: LOCATION ZONE: NOSE

## 2022-06-29 ASSESSMENT — LOCATION DETAILED DESCRIPTION DERM: LOCATION DETAILED: NASAL SUPRATIP

## 2022-06-29 ASSESSMENT — LOCATION SIMPLE DESCRIPTION DERM: LOCATION SIMPLE: NOSE

## 2022-07-05 ENCOUNTER — OFFICE VISIT (OUTPATIENT)
Dept: URBAN - METROPOLITAN AREA CLINIC 44 | Facility: CLINIC | Age: 67
End: 2022-07-05
Payer: MEDICARE

## 2022-07-05 DIAGNOSIS — H26.491 OTHER SECONDARY CATARACT, RIGHT EYE: ICD-10-CM

## 2022-07-05 DIAGNOSIS — H43.313 VITREOUS MEMBRANES AND STRANDS, BILATERAL: ICD-10-CM

## 2022-07-05 DIAGNOSIS — M06.9 RHEUMATOID ARTHRITIS, UNSPECIFIED: ICD-10-CM

## 2022-07-05 DIAGNOSIS — Z79.899 OTHER LONG TERM DRUG THERAPY: Primary | ICD-10-CM

## 2022-07-05 PROCEDURE — 92134 CPTRZ OPH DX IMG PST SGM RTA: CPT | Performed by: OPTOMETRIST

## 2022-07-05 PROCEDURE — 92083 EXTENDED VISUAL FIELD XM: CPT | Performed by: OPTOMETRIST

## 2022-07-05 PROCEDURE — 92004 COMPRE OPH EXAM NEW PT 1/>: CPT | Performed by: OPTOMETRIST

## 2022-07-05 ASSESSMENT — INTRAOCULAR PRESSURE
OD: 16
OS: 16

## 2022-07-05 ASSESSMENT — VISUAL ACUITY
OD: 20/20
OS: 20/25

## 2022-07-05 ASSESSMENT — KERATOMETRY
OD: 44.25
OS: 44.75

## 2022-07-05 NOTE — IMPRESSION/PLAN
Impression: Other secondary cataract, right eye: H26.491. Plan: Trace, Opacified capsule not affecting vision. No indication for treatment. Return if decreased vision.

## 2022-07-05 NOTE — IMPRESSION/PLAN
Impression: Other long term drug therapy: Z79.899. Plan: Pt taking Plaquenil 200mg QD. OCT and Visual Field wnl OU. No Plaquenil Toxicity. Okay to continue Plaquenil. Return in 1 year.

## 2022-07-08 ENCOUNTER — APPOINTMENT (RX ONLY)
Dept: URBAN - METROPOLITAN AREA CLINIC 176 | Facility: CLINIC | Age: 67
Setting detail: DERMATOLOGY
End: 2022-07-08

## 2022-07-08 VITALS — HEIGHT: 62.5 IN | WEIGHT: 165 LBS

## 2022-07-08 DIAGNOSIS — L57.8 OTHER SKIN CHANGES DUE TO CHRONIC EXPOSURE TO NONIONIZING RADIATION: ICD-10-CM

## 2022-07-08 DIAGNOSIS — L82.0 INFLAMED SEBORRHEIC KERATOSIS: ICD-10-CM

## 2022-07-08 DIAGNOSIS — Z71.89 OTHER SPECIFIED COUNSELING: ICD-10-CM

## 2022-07-08 PROBLEM — D23.39 OTHER BENIGN NEOPLASM OF SKIN OF OTHER PARTS OF FACE: Status: ACTIVE | Noted: 2022-07-08

## 2022-07-08 PROCEDURE — 17110 DESTRUCTION B9 LES UP TO 14: CPT

## 2022-07-08 PROCEDURE — 99212 OFFICE O/P EST SF 10 MIN: CPT | Mod: 25

## 2022-07-08 PROCEDURE — ? BENIGN DESTRUCTION

## 2022-07-08 PROCEDURE — ? BENIGN DESTRUCTION COSMETIC

## 2022-07-08 PROCEDURE — ? SUNSCREEN RECOMMENDATIONS

## 2022-07-08 PROCEDURE — ? COUNSELING

## 2022-07-08 ASSESSMENT — LOCATION DETAILED DESCRIPTION DERM
LOCATION DETAILED: LEFT DISTAL DORSAL FOREARM
LOCATION DETAILED: LEFT INFERIOR ANTERIOR NECK
LOCATION DETAILED: LEFT VENTRAL PROXIMAL FOREARM
LOCATION DETAILED: RIGHT PROXIMAL DORSAL FOREARM
LOCATION DETAILED: LEFT ANTERIOR DISTAL UPPER ARM
LOCATION DETAILED: RIGHT DISTAL DORSAL FOREARM
LOCATION DETAILED: LEFT INFERIOR MEDIAL FOREHEAD
LOCATION DETAILED: LEFT PROXIMAL DORSAL FOREARM
LOCATION DETAILED: RIGHT ANTERIOR DISTAL UPPER ARM
LOCATION DETAILED: RIGHT VENTRAL DISTAL FOREARM

## 2022-07-08 ASSESSMENT — LOCATION ZONE DERM
LOCATION ZONE: ARM
LOCATION ZONE: FACE
LOCATION ZONE: NECK

## 2022-07-08 ASSESSMENT — LOCATION SIMPLE DESCRIPTION DERM
LOCATION SIMPLE: LEFT FOREHEAD
LOCATION SIMPLE: RIGHT FOREARM
LOCATION SIMPLE: LEFT ANTERIOR NECK
LOCATION SIMPLE: LEFT FOREARM
LOCATION SIMPLE: RIGHT UPPER ARM
LOCATION SIMPLE: LEFT UPPER ARM

## 2022-07-08 ASSESSMENT — PAIN INTENSITY VAS: HOW INTENSE IS YOUR PAIN 0 BEING NO PAIN, 10 BEING THE MOST SEVERE PAIN POSSIBLE?: NO PAIN

## 2022-07-08 NOTE — PROCEDURE: BENIGN DESTRUCTION
Include Z78.9 (Other Specified Conditions Influencing Health Status) As An Associated Diagnosis?: Yes
Render Note In Bullet Format When Appropriate: No
Duration Of Freeze Thaw-Cycle (Seconds): 5-10
Consent: The patient's consent was obtained including but not limited to risks of crusting, scabbing, blistering, scarring, darker or lighter pigmentary change, recurrence, incomplete removal and infection.
Number Of Freeze-Thaw Cycles: 1 freeze-thaw cycle
Medical Necessity Information: It is in your best interest to select a reason for this procedure from the list below. All of these items fulfill various CMS LCD requirements except the new and changing color options.
Anesthesia Volume In Cc: 1
Medical Necessity Clause: This procedure was medically necessary because the lesions that were treated were:
Detail Level: Detailed
Post-Care Instructions: I reviewed with the patient in detail post-care instructions. Patient is to wear sunprotection, and avoid picking at any of the treated lesions. Pt may apply Vaseline to crusted or scabbing areas.
Treatment Number (Will Not Render If 0): 0

## 2022-08-03 ENCOUNTER — APPOINTMENT (RX ONLY)
Dept: URBAN - METROPOLITAN AREA CLINIC 176 | Facility: CLINIC | Age: 67
Setting detail: DERMATOLOGY
End: 2022-08-03

## 2022-08-03 VITALS — WEIGHT: 165 LBS | HEIGHT: 62.5 IN

## 2022-08-03 DIAGNOSIS — L57.8 OTHER SKIN CHANGES DUE TO CHRONIC EXPOSURE TO NONIONIZING RADIATION: ICD-10-CM

## 2022-08-03 DIAGNOSIS — L30.0 NUMMULAR DERMATITIS: ICD-10-CM

## 2022-08-03 DIAGNOSIS — L82.1 OTHER SEBORRHEIC KERATOSIS: ICD-10-CM

## 2022-08-03 DIAGNOSIS — L57.0 ACTINIC KERATOSIS: ICD-10-CM

## 2022-08-03 PROBLEM — D23.39 OTHER BENIGN NEOPLASM OF SKIN OF OTHER PARTS OF FACE: Status: ACTIVE | Noted: 2022-08-03

## 2022-08-03 PROBLEM — D48.5 NEOPLASM OF UNCERTAIN BEHAVIOR OF SKIN: Status: ACTIVE | Noted: 2022-08-03

## 2022-08-03 PROCEDURE — 11102 TANGNTL BX SKIN SINGLE LES: CPT

## 2022-08-03 PROCEDURE — ? COUNSELING

## 2022-08-03 PROCEDURE — 17000 DESTRUCT PREMALG LESION: CPT | Mod: 59

## 2022-08-03 PROCEDURE — ? BIOPSY BY SHAVE METHOD

## 2022-08-03 PROCEDURE — 17003 DESTRUCT PREMALG LES 2-14: CPT

## 2022-08-03 PROCEDURE — ? ADDITIONAL NOTES

## 2022-08-03 PROCEDURE — ? BENIGN DESTRUCTION COSMETIC

## 2022-08-03 PROCEDURE — ? LIQUID NITROGEN

## 2022-08-03 PROCEDURE — 99212 OFFICE O/P EST SF 10 MIN: CPT | Mod: 25

## 2022-08-03 ASSESSMENT — LOCATION SIMPLE DESCRIPTION DERM
LOCATION SIMPLE: RIGHT FOREHEAD
LOCATION SIMPLE: LEFT CHEEK
LOCATION SIMPLE: GLABELLA
LOCATION SIMPLE: LEFT TEMPLE
LOCATION SIMPLE: LEFT THIGH

## 2022-08-03 ASSESSMENT — LOCATION DETAILED DESCRIPTION DERM
LOCATION DETAILED: LEFT SUPERIOR LATERAL BUCCAL CHEEK
LOCATION DETAILED: LEFT MID TEMPLE
LOCATION DETAILED: RIGHT INFERIOR FOREHEAD
LOCATION DETAILED: GLABELLA
LOCATION DETAILED: LEFT ANTERIOR PROXIMAL THIGH

## 2022-08-03 ASSESSMENT — LOCATION ZONE DERM
LOCATION ZONE: LEG
LOCATION ZONE: FACE

## 2022-08-03 ASSESSMENT — PAIN INTENSITY VAS: HOW INTENSE IS YOUR PAIN 0 BEING NO PAIN, 10 BEING THE MOST SEVERE PAIN POSSIBLE?: NO PAIN

## 2022-08-03 NOTE — PROCEDURE: BIOPSY BY SHAVE METHOD
Body Location Override (Optional - Billing Will Still Be Based On Selected Body Map Location If Applicable): right deltoid
Detail Level: Detailed
Depth Of Biopsy: dermis
Was A Bandage Applied: Yes
Size Of Lesion In Cm: 0
Biopsy Type: H and E
Biopsy Method: Personna blade
Anesthesia Type: 1% lidocaine with 1:100,000 epinephrine and a 1:10 solution of 8.4% sodium bicarbonate
Anesthesia Volume In Cc (Will Not Render If 0): 0.5
Hemostasis: Electrocautery
Wound Care: Petrolatum
Dressing: bandage
Destruction After The Procedure: No
Type Of Destruction Used: Electrodesiccation and Curettage
Curettage Text: The wound bed was treated with curettage after the biopsy was performed.
Cryotherapy Text: The wound bed was treated with cryotherapy after the biopsy was performed.
Electrodesiccation Text: The wound bed was treated with electrodesiccation after the biopsy was performed.
Electrodesiccation And Curettage Text: The wound bed was treated with electrodesiccation and curettage after the biopsy was performed.
Silver Nitrate Text: The wound bed was treated with silver nitrate after the biopsy was performed.
Lab: [id number]
Lab Facility: [id number]
Consent: Written consent was obtained and risks were reviewed including but not limited to scarring, infection, bleeding, scabbing, incomplete removal, nerve damage and allergy to anesthesia.
Post-Care Instructions: I reviewed with the patient in detail post-care instructions. Patient is to keep the biopsy site dry for 24 hours, and then apply petroleum jelly daily until healed.
Notification Instructions: Patient will be notified of biopsy results. However, patient instructed to call the office if not contacted within 2 weeks.
Billing Type: Third-Party Bill
Information: Selecting Yes will display possible errors in your note based on the variables you have selected. This validation is only offered as a suggestion for you. PLEASE NOTE THAT THE VALIDATION TEXT WILL BE REMOVED WHEN YOU FINALIZE YOUR NOTE. IF YOU WANT TO FAX A PRELIMINARY NOTE YOU WILL NEED TO TOGGLE THIS TO 'NO' IF YOU DO NOT WANT IT IN YOUR FAXED NOTE.

## 2022-08-03 NOTE — PROCEDURE: LIQUID NITROGEN
Aperture Size (Optional): C
Render Note In Bullet Format When Appropriate: No
Post-Care Instructions: I reviewed with the patient in detail post-care instructions. Patient is to wear sunprotection, and avoid picking at any of the treated lesions. Pt may apply Vaseline to crusted or scabbing areas.
Duration Of Freeze Thaw-Cycle (Seconds): 10
Detail Level: Zone
Show Aperture Variable?: Yes
Consent: The patient's consent was obtained including but not limited to risks of crusting, scabbing, blistering, scarring, darker or lighter pigmentary change, recurrence, incomplete removal and infection.
Number Of Freeze-Thaw Cycles: 1 freeze-thaw cycle

## 2022-08-03 NOTE — PROCEDURE: ADDITIONAL NOTES
Additional Notes: Continue clobetasol ointment bid x 2 weeks, if persists rtc for re-evaluation
Detail Level: Simple
Render Risk Assessment In Note?: yes
Additional Notes: Recheck glabella in 2months

## 2022-10-06 ENCOUNTER — APPOINTMENT (RX ONLY)
Dept: URBAN - METROPOLITAN AREA CLINIC 176 | Facility: CLINIC | Age: 67
Setting detail: DERMATOLOGY
End: 2022-10-06

## 2022-10-06 VITALS — HEIGHT: 62.5 IN | WEIGHT: 165 LBS

## 2022-10-06 DIAGNOSIS — L57.0 ACTINIC KERATOSIS: ICD-10-CM | Status: RESOLVED

## 2022-10-06 DIAGNOSIS — L57.8 OTHER SKIN CHANGES DUE TO CHRONIC EXPOSURE TO NONIONIZING RADIATION: ICD-10-CM

## 2022-10-06 DIAGNOSIS — L82.0 INFLAMED SEBORRHEIC KERATOSIS: ICD-10-CM

## 2022-10-06 DIAGNOSIS — L91.0 HYPERTROPHIC SCAR: ICD-10-CM

## 2022-10-06 DIAGNOSIS — L72.0 EPIDERMAL CYST: ICD-10-CM

## 2022-10-06 DIAGNOSIS — L81.4 OTHER MELANIN HYPERPIGMENTATION: ICD-10-CM

## 2022-10-06 PROCEDURE — ? PHOTO-DOCUMENTATION

## 2022-10-06 PROCEDURE — ? COUNSELING

## 2022-10-06 PROCEDURE — 99213 OFFICE O/P EST LOW 20 MIN: CPT | Mod: 25

## 2022-10-06 PROCEDURE — ? OBSERVATION

## 2022-10-06 PROCEDURE — ? TREATMENT REGIMEN

## 2022-10-06 PROCEDURE — ? BENIGN DESTRUCTION COSMETIC

## 2022-10-06 PROCEDURE — ? SUNSCREEN RECOMMENDATIONS

## 2022-10-06 PROCEDURE — ? BENIGN DESTRUCTION

## 2022-10-06 PROCEDURE — 17110 DESTRUCTION B9 LES UP TO 14: CPT

## 2022-10-06 ASSESSMENT — LOCATION DETAILED DESCRIPTION DERM
LOCATION DETAILED: LEFT INFERIOR LATERAL MALAR CHEEK
LOCATION DETAILED: GLABELLA
LOCATION DETAILED: LEFT SUPERIOR LATERAL BUCCAL CHEEK
LOCATION DETAILED: RIGHT LATERAL PROXIMAL UPPER ARM
LOCATION DETAILED: RIGHT PROXIMAL DORSAL FOREARM
LOCATION DETAILED: RIGHT ANTERIOR PROXIMAL THIGH
LOCATION DETAILED: LEFT INFERIOR VERMILION LIP
LOCATION DETAILED: LEFT INFERIOR LATERAL FOREHEAD

## 2022-10-06 ASSESSMENT — LOCATION ZONE DERM
LOCATION ZONE: FACE
LOCATION ZONE: ARM
LOCATION ZONE: LIP
LOCATION ZONE: LEG

## 2022-10-06 ASSESSMENT — PAIN INTENSITY VAS: HOW INTENSE IS YOUR PAIN 0 BEING NO PAIN, 10 BEING THE MOST SEVERE PAIN POSSIBLE?: NO PAIN

## 2022-10-06 ASSESSMENT — LOCATION SIMPLE DESCRIPTION DERM
LOCATION SIMPLE: LEFT CHEEK
LOCATION SIMPLE: RIGHT FOREARM
LOCATION SIMPLE: GLABELLA
LOCATION SIMPLE: RIGHT THIGH
LOCATION SIMPLE: LEFT FOREHEAD
LOCATION SIMPLE: LEFT LIP
LOCATION SIMPLE: RIGHT UPPER ARM

## 2022-10-06 NOTE — PROCEDURE: BENIGN DESTRUCTION
Detail Level: Detailed
Anesthesia Volume In Cc: 1
Render Post-Care Instructions In Note?: yes
Treatment Number (Will Not Render If 0): 0
Medical Necessity Information: It is in your best interest to select a reason for this procedure from the list below. All of these items fulfill various CMS LCD requirements except the new and changing color options.
Render Note In Bullet Format When Appropriate: No
Consent: The patient's consent was obtained including but not limited to risks of crusting, scabbing, blistering, scarring, darker or lighter pigmentary change, recurrence, incomplete removal and infection.
Duration Of Freeze Thaw-Cycle (Seconds): 5-10
Post-Care Instructions: I reviewed with the patient in detail post-care instructions. Patient is to wear sunprotection, and avoid picking at any of the treated lesions. Pt may apply Vaseline to crusted or scabbing areas.
Medical Necessity Clause: This procedure was medically necessary because the lesions that were treated were:
Number Of Freeze-Thaw Cycles: 1 freeze-thaw cycle

## 2022-10-26 ENCOUNTER — APPOINTMENT (RX ONLY)
Dept: URBAN - METROPOLITAN AREA CLINIC 176 | Facility: CLINIC | Age: 67
Setting detail: DERMATOLOGY
End: 2022-10-26

## 2022-10-26 VITALS — HEIGHT: 62.5 IN | WEIGHT: 160 LBS

## 2022-10-26 DIAGNOSIS — L57.8 OTHER SKIN CHANGES DUE TO CHRONIC EXPOSURE TO NONIONIZING RADIATION: ICD-10-CM

## 2022-10-26 DIAGNOSIS — L82.1 OTHER SEBORRHEIC KERATOSIS: ICD-10-CM

## 2022-10-26 PROCEDURE — ? COUNSELING

## 2022-10-26 PROCEDURE — 99213 OFFICE O/P EST LOW 20 MIN: CPT

## 2022-10-26 ASSESSMENT — LOCATION SIMPLE DESCRIPTION DERM
LOCATION SIMPLE: LEFT CHEEK
LOCATION SIMPLE: POSTERIOR SCALP

## 2022-10-26 ASSESSMENT — LOCATION ZONE DERM
LOCATION ZONE: FACE
LOCATION ZONE: SCALP

## 2022-10-26 ASSESSMENT — LOCATION DETAILED DESCRIPTION DERM
LOCATION DETAILED: LEFT MEDIAL MALAR CHEEK
LOCATION DETAILED: RIGHT INFERIOR POSTAURICULAR SKIN

## 2023-01-17 ENCOUNTER — APPOINTMENT (RX ONLY)
Dept: URBAN - METROPOLITAN AREA CLINIC 176 | Facility: CLINIC | Age: 68
Setting detail: DERMATOLOGY
End: 2023-01-17

## 2023-01-17 VITALS — WEIGHT: 165 LBS | HEIGHT: 62 IN

## 2023-01-17 DIAGNOSIS — L57.8 OTHER SKIN CHANGES DUE TO CHRONIC EXPOSURE TO NONIONIZING RADIATION: ICD-10-CM

## 2023-01-17 DIAGNOSIS — L71.8 OTHER ROSACEA: ICD-10-CM | Status: WORSENING

## 2023-01-17 DIAGNOSIS — I78.8 OTHER DISEASES OF CAPILLARIES: ICD-10-CM

## 2023-01-17 PROCEDURE — ? PRESCRIPTION MEDICATION MANAGEMENT

## 2023-01-17 PROCEDURE — ? PRESCRIPTION SAMPLES PROVIDED

## 2023-01-17 PROCEDURE — ? PRESCRIPTION

## 2023-01-17 PROCEDURE — 99214 OFFICE O/P EST MOD 30 MIN: CPT

## 2023-01-17 PROCEDURE — ? COUNSELING

## 2023-01-17 RX ORDER — METRONIDAZOLE 7.5 MG/G
CREAM TOPICAL
Qty: 45 | Refills: 1

## 2023-01-17 ASSESSMENT — LOCATION DETAILED DESCRIPTION DERM
LOCATION DETAILED: LEFT MEDIAL MALAR CHEEK
LOCATION DETAILED: LEFT CENTRAL MALAR CHEEK

## 2023-01-17 ASSESSMENT — LOCATION ZONE DERM: LOCATION ZONE: FACE

## 2023-01-17 ASSESSMENT — LOCATION SIMPLE DESCRIPTION DERM: LOCATION SIMPLE: LEFT CHEEK

## 2023-01-17 NOTE — PROCEDURE: PRESCRIPTION SAMPLES PROVIDED
Expiration Date (Optional): 5/2023
Samples Given: Ovace Lotion applied qd (4 samples provided)\\nApply once daily
Detail Level: Zone
Lot/Batch Number (Optional): [id number]

## 2023-01-17 NOTE — PROCEDURE: PRESCRIPTION MEDICATION MANAGEMENT
Modify Regimen: Tretinoin 3 times a week mixed with a moisturizer.
Initiate Treatment: metronidazole 0.75 % topical cream Apply to the face bid. Pt will begin using once daily working up to twice daily.
Render In Strict Bullet Format?: No
Detail Level: Zone

## 2023-02-20 ENCOUNTER — APPOINTMENT (RX ONLY)
Dept: URBAN - METROPOLITAN AREA CLINIC 176 | Facility: CLINIC | Age: 68
Setting detail: DERMATOLOGY
End: 2023-02-20

## 2023-02-20 VITALS — HEIGHT: 62.5 IN | WEIGHT: 159 LBS

## 2023-02-20 DIAGNOSIS — L57.8 OTHER SKIN CHANGES DUE TO CHRONIC EXPOSURE TO NONIONIZING RADIATION: ICD-10-CM

## 2023-02-20 DIAGNOSIS — D18.0 HEMANGIOMA: ICD-10-CM

## 2023-02-20 DIAGNOSIS — Z71.89 OTHER SPECIFIED COUNSELING: ICD-10-CM

## 2023-02-20 DIAGNOSIS — L57.0 ACTINIC KERATOSIS: ICD-10-CM

## 2023-02-20 DIAGNOSIS — D22 MELANOCYTIC NEVI: ICD-10-CM

## 2023-02-20 DIAGNOSIS — L82.1 OTHER SEBORRHEIC KERATOSIS: ICD-10-CM

## 2023-02-20 DIAGNOSIS — L71.8 OTHER ROSACEA: ICD-10-CM

## 2023-02-20 DIAGNOSIS — L81.4 OTHER MELANIN HYPERPIGMENTATION: ICD-10-CM

## 2023-02-20 PROBLEM — D22.72 MELANOCYTIC NEVI OF LEFT LOWER LIMB, INCLUDING HIP: Status: ACTIVE | Noted: 2023-02-20

## 2023-02-20 PROBLEM — D22.5 MELANOCYTIC NEVI OF TRUNK: Status: ACTIVE | Noted: 2023-02-20

## 2023-02-20 PROBLEM — D22.71 MELANOCYTIC NEVI OF RIGHT LOWER LIMB, INCLUDING HIP: Status: ACTIVE | Noted: 2023-02-20

## 2023-02-20 PROBLEM — D18.01 HEMANGIOMA OF SKIN AND SUBCUTANEOUS TISSUE: Status: ACTIVE | Noted: 2023-02-20

## 2023-02-20 PROBLEM — D23.72 OTHER BENIGN NEOPLASM OF SKIN OF LEFT LOWER LIMB, INCLUDING HIP: Status: ACTIVE | Noted: 2023-02-20

## 2023-02-20 PROBLEM — D23.39 OTHER BENIGN NEOPLASM OF SKIN OF OTHER PARTS OF FACE: Status: ACTIVE | Noted: 2023-02-20

## 2023-02-20 PROBLEM — D22.61 MELANOCYTIC NEVI OF RIGHT UPPER LIMB, INCLUDING SHOULDER: Status: ACTIVE | Noted: 2023-02-20

## 2023-02-20 PROCEDURE — 17003 DESTRUCT PREMALG LES 2-14: CPT

## 2023-02-20 PROCEDURE — ? LIQUID NITROGEN

## 2023-02-20 PROCEDURE — ? PRESCRIPTION MEDICATION MANAGEMENT

## 2023-02-20 PROCEDURE — ? PHOTO-DOCUMENTATION

## 2023-02-20 PROCEDURE — ? COUNSELING

## 2023-02-20 PROCEDURE — 17000 DESTRUCT PREMALG LESION: CPT

## 2023-02-20 PROCEDURE — ? BENIGN DESTRUCTION COSMETIC

## 2023-02-20 PROCEDURE — 99214 OFFICE O/P EST MOD 30 MIN: CPT | Mod: 25

## 2023-02-20 PROCEDURE — ? PRESCRIPTION SAMPLES PROVIDED

## 2023-02-20 PROCEDURE — ? SUNSCREEN RECOMMENDATIONS

## 2023-02-20 ASSESSMENT — LOCATION DETAILED DESCRIPTION DERM
LOCATION DETAILED: RIGHT ANTERIOR MEDIAL MALLEOLUS
LOCATION DETAILED: RIGHT MEDIAL HEEL
LOCATION DETAILED: RIGHT RIB CAGE
LOCATION DETAILED: INFERIOR THORACIC SPINE
LOCATION DETAILED: GLUTEAL CLEFT
LOCATION DETAILED: RIGHT ANTERIOR PROXIMAL UPPER ARM
LOCATION DETAILED: RIGHT DISTAL POSTERIOR THIGH
LOCATION DETAILED: RIGHT SUPERIOR MEDIAL UPPER BACK
LOCATION DETAILED: NASAL TIP
LOCATION DETAILED: LEFT DORSAL GREAT TOE
LOCATION DETAILED: LEFT MEDIAL GREAT TOE
LOCATION DETAILED: RIGHT CENTRAL EYEBROW
LOCATION DETAILED: SUPERIOR LUMBAR SPINE
LOCATION DETAILED: SUPERIOR THORACIC SPINE
LOCATION DETAILED: LEFT PROXIMAL POSTERIOR THIGH

## 2023-02-20 ASSESSMENT — LOCATION ZONE DERM
LOCATION ZONE: NOSE
LOCATION ZONE: LEG
LOCATION ZONE: TOE
LOCATION ZONE: TRUNK
LOCATION ZONE: FEET
LOCATION ZONE: FACE
LOCATION ZONE: ARM

## 2023-02-20 ASSESSMENT — LOCATION SIMPLE DESCRIPTION DERM
LOCATION SIMPLE: GLUTEAL CLEFT
LOCATION SIMPLE: ABDOMEN
LOCATION SIMPLE: RIGHT EYEBROW
LOCATION SIMPLE: RIGHT POSTERIOR THIGH
LOCATION SIMPLE: UPPER BACK
LOCATION SIMPLE: RIGHT UPPER BACK
LOCATION SIMPLE: RIGHT UPPER ARM
LOCATION SIMPLE: RIGHT ANKLE
LOCATION SIMPLE: LEFT POSTERIOR THIGH
LOCATION SIMPLE: RIGHT FOOT
LOCATION SIMPLE: NOSE
LOCATION SIMPLE: LOWER BACK
LOCATION SIMPLE: LEFT GREAT TOE

## 2023-02-20 ASSESSMENT — PAIN INTENSITY VAS: HOW INTENSE IS YOUR PAIN 0 BEING NO PAIN, 10 BEING THE MOST SEVERE PAIN POSSIBLE?: NO PAIN

## 2023-02-20 NOTE — PROCEDURE: LIQUID NITROGEN
Post-Care Instructions: I reviewed with the patient in detail post-care instructions. Patient is to wear sunprotection, and avoid picking at any of the treated lesions. Pt may apply Vaseline to crusted or scabbing areas.
Detail Level: Zone
Aperture Size (Optional): C
Show Applicator Variable?: Yes
Number Of Freeze-Thaw Cycles: 1 freeze-thaw cycle
Consent: The patient's consent was obtained including but not limited to risks of crusting, scabbing, blistering, scarring, darker or lighter pigmentary change, recurrence, incomplete removal and infection.
Render Note In Bullet Format When Appropriate: No
Duration Of Freeze Thaw-Cycle (Seconds): 10

## 2023-02-20 NOTE — PROCEDURE: PRESCRIPTION SAMPLES PROVIDED
Expiration Date (Optional): 5/2023
Samples Given: Ovace Lotion applied qd (4 samples provided)\\nApply once daily
Detail Level: Zone
Lot/Batch Number (Optional): [id number]

## 2023-02-20 NOTE — PROCEDURE: PRESCRIPTION MEDICATION MANAGEMENT
Continue Regimen: Metrocream/ovace lotion
Modify Regimen: Tretinoin 3 times a week mixed with a moisturizer.
Render In Strict Bullet Format?: No
Detail Level: Zone

## 2023-04-04 ENCOUNTER — APPOINTMENT (RX ONLY)
Dept: URBAN - METROPOLITAN AREA CLINIC 176 | Facility: CLINIC | Age: 68
Setting detail: DERMATOLOGY
End: 2023-04-04

## 2023-04-04 VITALS — WEIGHT: 160 LBS | HEIGHT: 62.5 IN

## 2023-04-04 DIAGNOSIS — L738 OTHER SPECIFIED DISEASES OF HAIR AND HAIR FOLLICLES: ICD-10-CM

## 2023-04-04 DIAGNOSIS — L57.8 OTHER SKIN CHANGES DUE TO CHRONIC EXPOSURE TO NONIONIZING RADIATION: ICD-10-CM

## 2023-04-04 DIAGNOSIS — L82.0 INFLAMED SEBORRHEIC KERATOSIS: ICD-10-CM

## 2023-04-04 DIAGNOSIS — L73.9 FOLLICULAR DISORDER, UNSPECIFIED: ICD-10-CM

## 2023-04-04 DIAGNOSIS — L663 OTHER SPECIFIED DISEASES OF HAIR AND HAIR FOLLICLES: ICD-10-CM

## 2023-04-04 PROBLEM — L02.222 FURUNCLE OF BACK [ANY PART, EXCEPT BUTTOCK]: Status: ACTIVE | Noted: 2023-04-04

## 2023-04-04 PROCEDURE — ? COUNSELING

## 2023-04-04 PROCEDURE — 99213 OFFICE O/P EST LOW 20 MIN: CPT | Mod: 25

## 2023-04-04 PROCEDURE — 17110 DESTRUCTION B9 LES UP TO 14: CPT

## 2023-04-04 PROCEDURE — ? PRESCRIPTION

## 2023-04-04 PROCEDURE — ? BENIGN DESTRUCTION

## 2023-04-04 RX ORDER — CLINDAMYCIN PHOSPHATE 10 MG/G
GEL TOPICAL
Qty: 30 | Refills: 5 | Status: ERX

## 2023-04-04 ASSESSMENT — LOCATION DETAILED DESCRIPTION DERM
LOCATION DETAILED: RIGHT CENTRAL PARIETAL SCALP
LOCATION DETAILED: RIGHT SUPERIOR MEDIAL UPPER BACK

## 2023-04-04 ASSESSMENT — LOCATION ZONE DERM
LOCATION ZONE: TRUNK
LOCATION ZONE: SCALP

## 2023-04-04 ASSESSMENT — PAIN INTENSITY VAS: HOW INTENSE IS YOUR PAIN 0 BEING NO PAIN, 10 BEING THE MOST SEVERE PAIN POSSIBLE?: NO PAIN

## 2023-04-04 ASSESSMENT — SEVERITY ASSESSMENT: SEVERITY: MILD TO MODERATE

## 2023-04-04 ASSESSMENT — LOCATION SIMPLE DESCRIPTION DERM
LOCATION SIMPLE: SCALP
LOCATION SIMPLE: RIGHT UPPER BACK

## 2023-04-04 NOTE — PROCEDURE: BENIGN DESTRUCTION
Anesthesia Volume In Cc: 1
Treatment Number (Will Not Render If 0): 0
Medical Necessity Clause: This procedure was medically necessary because the lesions that were treated were:
Include Z78.9 (Other Specified Conditions Influencing Health Status) As An Associated Diagnosis?: Yes
Post-Care Instructions: I reviewed with the patient in detail post-care instructions. Patient is to wear sunprotection, and avoid picking at any of the treated lesions. Pt may apply Vaseline to crusted or scabbing areas.
Add 52 Modifier (Optional): no
Medical Necessity Information: It is in your best interest to select a reason for this procedure from the list below. All of these items fulfill various CMS LCD requirements except the new and changing color options.
Detail Level: Detailed
Consent: The patient's consent was obtained including but not limited to risks of crusting, scabbing, blistering, scarring, darker or lighter pigmentary change, recurrence, incomplete removal and infection.
Duration Of Freeze Thaw-Cycle (Seconds): 5-10
Number Of Freeze-Thaw Cycles: 1 freeze-thaw cycle

## 2023-05-18 ENCOUNTER — APPOINTMENT (RX ONLY)
Dept: URBAN - METROPOLITAN AREA CLINIC 176 | Facility: CLINIC | Age: 68
Setting detail: DERMATOLOGY
End: 2023-05-18

## 2023-05-18 VITALS — HEIGHT: 62.5 IN | WEIGHT: 160 LBS

## 2023-05-18 DIAGNOSIS — L663 OTHER SPECIFIED DISEASES OF HAIR AND HAIR FOLLICLES: ICD-10-CM | Status: RESOLVED

## 2023-05-18 DIAGNOSIS — L57.0 ACTINIC KERATOSIS: ICD-10-CM

## 2023-05-18 DIAGNOSIS — L73.9 FOLLICULAR DISORDER, UNSPECIFIED: ICD-10-CM | Status: RESOLVED

## 2023-05-18 DIAGNOSIS — L72.8 OTHER FOLLICULAR CYSTS OF THE SKIN AND SUBCUTANEOUS TISSUE: ICD-10-CM

## 2023-05-18 DIAGNOSIS — L738 OTHER SPECIFIED DISEASES OF HAIR AND HAIR FOLLICLES: ICD-10-CM | Status: RESOLVED

## 2023-05-18 PROBLEM — D23.39 OTHER BENIGN NEOPLASM OF SKIN OF OTHER PARTS OF FACE: Status: ACTIVE | Noted: 2023-05-18

## 2023-05-18 PROBLEM — L02.222 FURUNCLE OF BACK [ANY PART, EXCEPT BUTTOCK]: Status: ACTIVE | Noted: 2023-05-18

## 2023-05-18 PROCEDURE — ? BENIGN DESTRUCTION COSMETIC

## 2023-05-18 PROCEDURE — 17000 DESTRUCT PREMALG LESION: CPT

## 2023-05-18 PROCEDURE — 99213 OFFICE O/P EST LOW 20 MIN: CPT | Mod: 25

## 2023-05-18 PROCEDURE — ? LIQUID NITROGEN

## 2023-05-18 PROCEDURE — ? PRESCRIPTION MEDICATION MANAGEMENT

## 2023-05-18 PROCEDURE — ? COUNSELING

## 2023-05-18 ASSESSMENT — LOCATION ZONE DERM
LOCATION ZONE: NOSE
LOCATION ZONE: FACE
LOCATION ZONE: TRUNK

## 2023-05-18 ASSESSMENT — LOCATION DETAILED DESCRIPTION DERM
LOCATION DETAILED: NASAL TIP
LOCATION DETAILED: RIGHT INFERIOR MEDIAL MALAR CHEEK
LOCATION DETAILED: LOWER STERNUM
LOCATION DETAILED: RIGHT SUPERIOR MEDIAL UPPER BACK

## 2023-05-18 ASSESSMENT — LOCATION SIMPLE DESCRIPTION DERM
LOCATION SIMPLE: CHEST
LOCATION SIMPLE: RIGHT CHEEK
LOCATION SIMPLE: NOSE
LOCATION SIMPLE: RIGHT UPPER BACK

## 2023-05-18 ASSESSMENT — PAIN INTENSITY VAS: HOW INTENSE IS YOUR PAIN 0 BEING NO PAIN, 10 BEING THE MOST SEVERE PAIN POSSIBLE?: NO PAIN

## 2023-05-18 NOTE — PROCEDURE: LIQUID NITROGEN
Number Of Freeze-Thaw Cycles: 1 freeze-thaw cycle
Post-Care Instructions: I reviewed with the patient in detail post-care instructions. Patient is to wear sunprotection, and avoid picking at any of the treated lesions. Pt may apply Vaseline to crusted or scabbing areas.
Render Note In Bullet Format When Appropriate: No
Detail Level: Detailed
Show Applicator Variable?: Yes
Consent: The patient's consent was obtained including but not limited to risks of crusting, scabbing, blistering, scarring, darker or lighter pigmentary change, recurrence, incomplete removal and infection.
Aperture Size (Optional): C
Duration Of Freeze Thaw-Cycle (Seconds): 10

## 2023-05-18 NOTE — PROCEDURE: BENIGN DESTRUCTION COSMETIC
Consent: The patient's consent was obtained including but not limited to risks of crusting, scabbing, blistering, scarring, darker or lighter pigmentary change, recurrence, incomplete removal and infection.
Anesthesia Volume In Cc: 0
Anesthesia Type: 0.05% lidocaine with 1:100,000 epinephrine and a 1:100 solution of 8.4% sodium bicarbonate
Post-Care Instructions: I reviewed with the patient in detail post-care instructions. Patient is to wear sunprotection, and avoid picking at any of the treated lesions. Pt may apply Vaseline to crusted or scabbing areas.
Detail Level: Detailed

## 2023-05-18 NOTE — PROCEDURE: PRESCRIPTION MEDICATION MANAGEMENT
Detail Level: Zone
Render In Strict Bullet Format?: No
Continue Regimen: -Clindamycin gel bid prn flares

## 2023-08-04 ENCOUNTER — OFFICE VISIT (OUTPATIENT)
Dept: URBAN - METROPOLITAN AREA CLINIC 44 | Facility: CLINIC | Age: 68
End: 2023-08-04
Payer: MEDICARE

## 2023-08-04 DIAGNOSIS — M06.9 RHEUMATOID ARTHRITIS, UNSPECIFIED: ICD-10-CM

## 2023-08-04 DIAGNOSIS — Z79.899 OTHER LONG TERM (CURRENT) DRUG THERAPY: Primary | ICD-10-CM

## 2023-08-04 PROCEDURE — 92134 CPTRZ OPH DX IMG PST SGM RTA: CPT | Performed by: OPTOMETRIST

## 2023-08-04 PROCEDURE — 92014 COMPRE OPH EXAM EST PT 1/>: CPT | Performed by: OPTOMETRIST

## 2023-08-04 PROCEDURE — 92083 EXTENDED VISUAL FIELD XM: CPT | Performed by: OPTOMETRIST

## 2023-08-04 ASSESSMENT — INTRAOCULAR PRESSURE
OD: 18
OS: 18

## 2023-08-04 ASSESSMENT — KERATOMETRY
OD: 45.63
OS: 45.50

## 2023-08-04 ASSESSMENT — VISUAL ACUITY
OD: 20/20
OS: 20/25

## 2023-10-11 ENCOUNTER — APPOINTMENT (RX ONLY)
Dept: URBAN - METROPOLITAN AREA CLINIC 176 | Facility: CLINIC | Age: 68
Setting detail: DERMATOLOGY
End: 2023-10-11

## 2023-10-11 VITALS — WEIGHT: 160 LBS | HEIGHT: 62.5 IN

## 2023-10-11 DIAGNOSIS — L82.0 INFLAMED SEBORRHEIC KERATOSIS: ICD-10-CM

## 2023-10-11 DIAGNOSIS — L82.1 OTHER SEBORRHEIC KERATOSIS: ICD-10-CM

## 2023-10-11 DIAGNOSIS — L57.8 OTHER SKIN CHANGES DUE TO CHRONIC EXPOSURE TO NONIONIZING RADIATION: ICD-10-CM

## 2023-10-11 PROCEDURE — 17110 DESTRUCTION B9 LES UP TO 14: CPT

## 2023-10-11 PROCEDURE — ? BENIGN DESTRUCTION

## 2023-10-11 PROCEDURE — ? COUNSELING

## 2023-10-11 PROCEDURE — 99213 OFFICE O/P EST LOW 20 MIN: CPT | Mod: 25

## 2023-10-11 ASSESSMENT — LOCATION ZONE DERM
LOCATION ZONE: NECK
LOCATION ZONE: LEG
LOCATION ZONE: TRUNK
LOCATION ZONE: ARM

## 2023-10-11 ASSESSMENT — LOCATION SIMPLE DESCRIPTION DERM
LOCATION SIMPLE: RIGHT FOREARM
LOCATION SIMPLE: RIGHT UPPER BACK
LOCATION SIMPLE: LEFT ANKLE
LOCATION SIMPLE: LEFT FOREARM
LOCATION SIMPLE: NECK
LOCATION SIMPLE: LEFT PRETIBIAL REGION

## 2023-10-11 ASSESSMENT — LOCATION DETAILED DESCRIPTION DERM
LOCATION DETAILED: RIGHT INFERIOR LATERAL NECK
LOCATION DETAILED: LEFT ANKLE
LOCATION DETAILED: RIGHT SUPERIOR MEDIAL UPPER BACK
LOCATION DETAILED: LEFT PROXIMAL DORSAL FOREARM
LOCATION DETAILED: RIGHT PROXIMAL DORSAL FOREARM
LOCATION DETAILED: LEFT DISTAL PRETIBIAL REGION

## 2023-10-11 ASSESSMENT — PAIN INTENSITY VAS: HOW INTENSE IS YOUR PAIN 0 BEING NO PAIN, 10 BEING THE MOST SEVERE PAIN POSSIBLE?: NO PAIN

## 2023-10-11 NOTE — PROCEDURE: BENIGN DESTRUCTION
Consent: The patient's consent was obtained including but not limited to risks of crusting, scabbing, blistering, scarring, darker or lighter pigmentary change, recurrence, incomplete removal and infection. Please return to clinic if lesions do not resolve after 4-6 weeks .

## 2024-02-21 ENCOUNTER — APPOINTMENT (RX ONLY)
Dept: URBAN - METROPOLITAN AREA CLINIC 176 | Facility: CLINIC | Age: 69
Setting detail: DERMATOLOGY
End: 2024-02-21

## 2024-02-21 VITALS — WEIGHT: 160 LBS | HEIGHT: 62.5 IN

## 2024-02-21 DIAGNOSIS — D22 MELANOCYTIC NEVI: ICD-10-CM

## 2024-02-21 DIAGNOSIS — L57.0 ACTINIC KERATOSIS: ICD-10-CM

## 2024-02-21 DIAGNOSIS — L71.8 OTHER ROSACEA: ICD-10-CM

## 2024-02-21 DIAGNOSIS — L57.8 OTHER SKIN CHANGES DUE TO CHRONIC EXPOSURE TO NONIONIZING RADIATION: ICD-10-CM

## 2024-02-21 DIAGNOSIS — L82.1 OTHER SEBORRHEIC KERATOSIS: ICD-10-CM

## 2024-02-21 DIAGNOSIS — D485 NEOPLASM OF UNCERTAIN BEHAVIOR OF SKIN: ICD-10-CM

## 2024-02-21 DIAGNOSIS — L81.4 OTHER MELANIN HYPERPIGMENTATION: ICD-10-CM

## 2024-02-21 DIAGNOSIS — Z71.89 OTHER SPECIFIED COUNSELING: ICD-10-CM

## 2024-02-21 DIAGNOSIS — D18.0 HEMANGIOMA: ICD-10-CM

## 2024-02-21 PROBLEM — D18.01 HEMANGIOMA OF SKIN AND SUBCUTANEOUS TISSUE: Status: ACTIVE | Noted: 2024-02-21

## 2024-02-21 PROBLEM — D48.5 NEOPLASM OF UNCERTAIN BEHAVIOR OF SKIN: Status: ACTIVE | Noted: 2024-02-21

## 2024-02-21 PROBLEM — D22.71 MELANOCYTIC NEVI OF RIGHT LOWER LIMB, INCLUDING HIP: Status: ACTIVE | Noted: 2024-02-21

## 2024-02-21 PROBLEM — D22.61 MELANOCYTIC NEVI OF RIGHT UPPER LIMB, INCLUDING SHOULDER: Status: ACTIVE | Noted: 2024-02-21

## 2024-02-21 PROBLEM — D23.72 OTHER BENIGN NEOPLASM OF SKIN OF LEFT LOWER LIMB, INCLUDING HIP: Status: ACTIVE | Noted: 2024-02-21

## 2024-02-21 PROBLEM — D22.72 MELANOCYTIC NEVI OF LEFT LOWER LIMB, INCLUDING HIP: Status: ACTIVE | Noted: 2024-02-21

## 2024-02-21 PROBLEM — D22.5 MELANOCYTIC NEVI OF TRUNK: Status: ACTIVE | Noted: 2024-02-21

## 2024-02-21 PROCEDURE — ? BIOPSY BY SHAVE METHOD

## 2024-02-21 PROCEDURE — 99214 OFFICE O/P EST MOD 30 MIN: CPT | Mod: 25

## 2024-02-21 PROCEDURE — ? PHOTO-DOCUMENTATION

## 2024-02-21 PROCEDURE — 11103 TANGNTL BX SKIN EA SEP/ADDL: CPT

## 2024-02-21 PROCEDURE — ? SUNSCREEN RECOMMENDATIONS

## 2024-02-21 PROCEDURE — 11102 TANGNTL BX SKIN SINGLE LES: CPT

## 2024-02-21 PROCEDURE — ? PRESCRIPTION MEDICATION MANAGEMENT

## 2024-02-21 PROCEDURE — ? COUNSELING

## 2024-02-21 ASSESSMENT — LOCATION SIMPLE DESCRIPTION DERM
LOCATION SIMPLE: UPPER BACK
LOCATION SIMPLE: RIGHT POSTERIOR THIGH
LOCATION SIMPLE: GLUTEAL CLEFT
LOCATION SIMPLE: RIGHT FOOT
LOCATION SIMPLE: RIGHT ANKLE
LOCATION SIMPLE: PERIANAL SKIN
LOCATION SIMPLE: LEFT POSTERIOR THIGH
LOCATION SIMPLE: LOWER BACK
LOCATION SIMPLE: RIGHT UPPER BACK
LOCATION SIMPLE: RIGHT UPPER ARM
LOCATION SIMPLE: RIGHT FOREARM
LOCATION SIMPLE: PERIANAL SKIN
LOCATION SIMPLE: ABDOMEN
LOCATION SIMPLE: LEFT GREAT TOE

## 2024-02-21 ASSESSMENT — LOCATION ZONE DERM
LOCATION ZONE: TOE
LOCATION ZONE: ANUS
LOCATION ZONE: FEET
LOCATION ZONE: ANUS
LOCATION ZONE: TRUNK
LOCATION ZONE: ARM
LOCATION ZONE: LEG

## 2024-02-21 ASSESSMENT — LOCATION DETAILED DESCRIPTION DERM
LOCATION DETAILED: PERIANAL SKIN
LOCATION DETAILED: INFERIOR THORACIC SPINE
LOCATION DETAILED: SUPERIOR LUMBAR SPINE
LOCATION DETAILED: RIGHT RIB CAGE
LOCATION DETAILED: RIGHT MEDIAL HEEL
LOCATION DETAILED: PERIANAL SKIN
LOCATION DETAILED: RIGHT ANTERIOR MEDIAL MALLEOLUS
LOCATION DETAILED: RIGHT ANTERIOR PROXIMAL UPPER ARM
LOCATION DETAILED: RIGHT DISTAL POSTERIOR THIGH
LOCATION DETAILED: LEFT DORSAL GREAT TOE
LOCATION DETAILED: RIGHT SUPERIOR MEDIAL UPPER BACK
LOCATION DETAILED: RIGHT PROXIMAL DORSAL FOREARM
LOCATION DETAILED: LEFT PROXIMAL POSTERIOR THIGH
LOCATION DETAILED: SUPERIOR THORACIC SPINE
LOCATION DETAILED: LEFT MEDIAL GREAT TOE
LOCATION DETAILED: GLUTEAL CLEFT

## 2024-02-21 NOTE — PROCEDURE: BIOPSY BY SHAVE METHOD
Detail Level: Detailed
Depth Of Biopsy: dermis
Was A Bandage Applied: Yes
Size Of Lesion In Cm: 0
Biopsy Type: H and E
Biopsy Method: Dermablade
Anesthesia Type: 0.5% lidocaine with 1:200,000 epinephrine and a 1:10 solution of 8.4% sodium bicarbonate
Anesthesia Volume In Cc: 0.5
Hemostasis: Electrocautery
Wound Care: Petrolatum
Dressing: no dressing applied
Destruction After The Procedure: No
Type Of Destruction Used: Curettage
Curettage Text: The wound bed was treated with curettage after the biopsy was performed.
Cryotherapy Text: The wound bed was treated with cryotherapy after the biopsy was performed.
Electrodesiccation Text: The wound bed was treated with electrodesiccation after the biopsy was performed.
Electrodesiccation And Curettage Text: The wound bed was treated with electrodesiccation and curettage after the biopsy was performed.
Silver Nitrate Text: The wound bed was treated with silver nitrate after the biopsy was performed.
Lab: [id number]
Lab Facility: [id number]
Consent: Written consent was obtained and risks were reviewed including but not limited to scarring, infection, bleeding, scabbing, incomplete removal, nerve damage and allergy to anesthesia.
Post-Care Instructions: I reviewed with the patient in detail post-care instructions. Patient is to keep the biopsy site dry overnight, and then apply bacitracin twice daily until healed. Patient may apply hydrogen peroxide soaks to remove any crusting.
Notification Instructions: Patient will be notified of biopsy results. However, patient instructed to call the office if not contacted within 2 weeks.
Billing Type: Third-Party Bill
Information: Selecting Yes will display possible errors in your note based on the variables you have selected. This validation is only offered as a suggestion for you. PLEASE NOTE THAT THE VALIDATION TEXT WILL BE REMOVED WHEN YOU FINALIZE YOUR NOTE. IF YOU WANT TO FAX A PRELIMINARY NOTE YOU WILL NEED TO TOGGLE THIS TO 'NO' IF YOU DO NOT WANT IT IN YOUR FAXED NOTE.
Body Location Override (Optional - Billing Will Still Be Based On Selected Body Map Location If Applicable): perianal skin 5:00

## 2024-02-21 NOTE — HPI: SKIN LESION
treated_been_treated
What Type Of Note Output Would You Prefer (Optional)?: Bullet Format
How Severe Is Your Skin Lesion?: moderate
Has Your Skin Lesion Been Treated?: not been treated
Is This A New Presentation, Or A Follow-Up?: Skin Lesion

## 2024-08-08 ENCOUNTER — OFFICE VISIT (OUTPATIENT)
Dept: URBAN - METROPOLITAN AREA CLINIC 44 | Facility: CLINIC | Age: 69
End: 2024-08-08
Payer: MEDICARE

## 2024-08-08 DIAGNOSIS — Z79.899 OTHER LONG TERM (CURRENT) DRUG THERAPY: Primary | ICD-10-CM

## 2024-08-08 DIAGNOSIS — H26.493 OTHER SECONDARY CATARACT, BILATERAL: ICD-10-CM

## 2024-08-08 DIAGNOSIS — M06.9 RHEUMATOID ARTHRITIS, UNSPECIFIED: ICD-10-CM

## 2024-08-08 PROCEDURE — 92014 COMPRE OPH EXAM EST PT 1/>: CPT | Performed by: OPTOMETRIST

## 2024-08-08 PROCEDURE — 92134 CPTRZ OPH DX IMG PST SGM RTA: CPT | Performed by: OPTOMETRIST

## 2024-08-08 PROCEDURE — 92083 EXTENDED VISUAL FIELD XM: CPT | Performed by: OPTOMETRIST

## 2024-08-08 ASSESSMENT — KERATOMETRY
OS: 44.75
OD: 44.38

## 2024-08-08 ASSESSMENT — INTRAOCULAR PRESSURE
OD: 16
OS: 16

## 2024-08-08 ASSESSMENT — VISUAL ACUITY
OD: 20/20
OS: 20/20

## 2024-08-15 ENCOUNTER — APPOINTMENT (RX ONLY)
Dept: URBAN - METROPOLITAN AREA CLINIC 176 | Facility: CLINIC | Age: 69
Setting detail: DERMATOLOGY
End: 2024-08-15

## 2024-08-15 VITALS — WEIGHT: 160 LBS | HEIGHT: 62.5 IN

## 2024-08-15 DIAGNOSIS — Z85.828 PERSONAL HISTORY OF OTHER MALIGNANT NEOPLASM OF SKIN: ICD-10-CM

## 2024-08-15 DIAGNOSIS — Z71.89 OTHER SPECIFIED COUNSELING: ICD-10-CM

## 2024-08-15 DIAGNOSIS — L70.0 ACNE VULGARIS: ICD-10-CM

## 2024-08-15 DIAGNOSIS — D22 MELANOCYTIC NEVI: ICD-10-CM

## 2024-08-15 DIAGNOSIS — L82.0 INFLAMED SEBORRHEIC KERATOSIS: ICD-10-CM | Status: IMPROVED

## 2024-08-15 DIAGNOSIS — L82.1 OTHER SEBORRHEIC KERATOSIS: ICD-10-CM

## 2024-08-15 DIAGNOSIS — D17 BENIGN LIPOMATOUS NEOPLASM: ICD-10-CM

## 2024-08-15 DIAGNOSIS — L57.8 OTHER SKIN CHANGES DUE TO CHRONIC EXPOSURE TO NONIONIZING RADIATION: ICD-10-CM

## 2024-08-15 DIAGNOSIS — D18.0 HEMANGIOMA: ICD-10-CM

## 2024-08-15 DIAGNOSIS — L71.8 OTHER ROSACEA: ICD-10-CM

## 2024-08-15 DIAGNOSIS — L73.8 OTHER SPECIFIED FOLLICULAR DISORDERS: ICD-10-CM

## 2024-08-15 DIAGNOSIS — L81.4 OTHER MELANIN HYPERPIGMENTATION: ICD-10-CM

## 2024-08-15 PROBLEM — D17.22 BENIGN LIPOMATOUS NEOPLASM OF SKIN AND SUBCUTANEOUS TISSUE OF LEFT ARM: Status: ACTIVE | Noted: 2024-08-15

## 2024-08-15 PROBLEM — D22.5 MELANOCYTIC NEVI OF TRUNK: Status: ACTIVE | Noted: 2024-08-15

## 2024-08-15 PROBLEM — D22.61 MELANOCYTIC NEVI OF RIGHT UPPER LIMB, INCLUDING SHOULDER: Status: ACTIVE | Noted: 2024-08-15

## 2024-08-15 PROBLEM — D22.72 MELANOCYTIC NEVI OF LEFT LOWER LIMB, INCLUDING HIP: Status: ACTIVE | Noted: 2024-08-15

## 2024-08-15 PROBLEM — D23.72 OTHER BENIGN NEOPLASM OF SKIN OF LEFT LOWER LIMB, INCLUDING HIP: Status: ACTIVE | Noted: 2024-08-15

## 2024-08-15 PROBLEM — D18.01 HEMANGIOMA OF SKIN AND SUBCUTANEOUS TISSUE: Status: ACTIVE | Noted: 2024-08-15

## 2024-08-15 PROBLEM — D22.71 MELANOCYTIC NEVI OF RIGHT LOWER LIMB, INCLUDING HIP: Status: ACTIVE | Noted: 2024-08-15

## 2024-08-15 PROBLEM — D48.5 NEOPLASM OF UNCERTAIN BEHAVIOR OF SKIN: Status: ACTIVE | Noted: 2024-08-15

## 2024-08-15 PROCEDURE — ? COUNSELING

## 2024-08-15 PROCEDURE — ? BENIGN DESTRUCTION COSMETIC

## 2024-08-15 PROCEDURE — ? SUNSCREEN RECOMMENDATIONS

## 2024-08-15 PROCEDURE — 99214 OFFICE O/P EST MOD 30 MIN: CPT | Mod: 25

## 2024-08-15 PROCEDURE — 11102 TANGNTL BX SKIN SINGLE LES: CPT | Mod: 59

## 2024-08-15 PROCEDURE — ? PRESCRIPTION

## 2024-08-15 PROCEDURE — ? BIOPSY BY SHAVE METHOD

## 2024-08-15 PROCEDURE — 17110 DESTRUCTION B9 LES UP TO 14: CPT

## 2024-08-15 PROCEDURE — ? PHOTO-DOCUMENTATION

## 2024-08-15 PROCEDURE — ? BENIGN DESTRUCTION

## 2024-08-15 PROCEDURE — ? ADDITIONAL NOTES

## 2024-08-15 RX ORDER — TRETIONIN 0.5 MG/G
CREAM TOPICAL
Qty: 45 | Refills: 0 | Status: ERX | COMMUNITY
Start: 2024-08-15

## 2024-08-15 RX ADMIN — TRETIONIN: 0.5 CREAM TOPICAL at 00:00

## 2024-08-15 ASSESSMENT — LOCATION DETAILED DESCRIPTION DERM
LOCATION DETAILED: LEFT INFERIOR MEDIAL MALAR CHEEK
LOCATION DETAILED: GLUTEAL CLEFT
LOCATION DETAILED: RIGHT DISTAL POSTERIOR THIGH
LOCATION DETAILED: INFERIOR THORACIC SPINE
LOCATION DETAILED: LEFT VENTRAL PROXIMAL FOREARM
LOCATION DETAILED: RIGHT PROXIMAL DORSAL FOREARM
LOCATION DETAILED: RIGHT ANTERIOR MEDIAL MALLEOLUS
LOCATION DETAILED: LEFT PROXIMAL POSTERIOR THIGH
LOCATION DETAILED: PERIANAL SKIN
LOCATION DETAILED: LEFT SUPERIOR LATERAL NECK
LOCATION DETAILED: RIGHT RIB CAGE
LOCATION DETAILED: LEFT INFERIOR LATERAL MALAR CHEEK
LOCATION DETAILED: LEFT SUPERIOR LATERAL NECK
LOCATION DETAILED: LEFT INFERIOR LATERAL NECK
LOCATION DETAILED: RIGHT SUPERIOR POSTAURICULAR SKIN
LOCATION DETAILED: LEFT CENTRAL LATERAL NECK
LOCATION DETAILED: RIGHT PROXIMAL RADIAL DORSAL FOREARM
LOCATION DETAILED: RIGHT INFERIOR OCCIPITAL SCALP
LOCATION DETAILED: NASAL DORSUM
LOCATION DETAILED: LEFT SUPERIOR LATERAL BUCCAL CHEEK
LOCATION DETAILED: LEFT INFERIOR CENTRAL MALAR CHEEK
LOCATION DETAILED: RIGHT SUPERIOR MEDIAL UPPER BACK
LOCATION DETAILED: LEFT DORSAL GREAT TOE
LOCATION DETAILED: RIGHT MEDIAL HEEL
LOCATION DETAILED: SUPERIOR LUMBAR SPINE
LOCATION DETAILED: LEFT MEDIAL GREAT TOE
LOCATION DETAILED: LEFT NASAL SIDEWALL
LOCATION DETAILED: LEFT CENTRAL MALAR CHEEK
LOCATION DETAILED: RIGHT CENTRAL FRONTAL SCALP
LOCATION DETAILED: LEFT PROXIMAL DORSAL FOREARM
LOCATION DETAILED: RIGHT ANTERIOR PROXIMAL UPPER ARM
LOCATION DETAILED: SUPERIOR THORACIC SPINE
LOCATION DETAILED: LEFT MEDIAL MALAR CHEEK

## 2024-08-15 ASSESSMENT — LOCATION SIMPLE DESCRIPTION DERM
LOCATION SIMPLE: LOWER BACK
LOCATION SIMPLE: LEFT ANTERIOR NECK
LOCATION SIMPLE: RIGHT UPPER BACK
LOCATION SIMPLE: NOSE
LOCATION SIMPLE: RIGHT ANKLE
LOCATION SIMPLE: ABDOMEN
LOCATION SIMPLE: PERIANAL SKIN
LOCATION SIMPLE: GLUTEAL CLEFT
LOCATION SIMPLE: UPPER BACK
LOCATION SIMPLE: LEFT NOSE
LOCATION SIMPLE: LEFT GREAT TOE
LOCATION SIMPLE: LEFT CHEEK
LOCATION SIMPLE: RIGHT FOREARM
LOCATION SIMPLE: LEFT ANTERIOR NECK
LOCATION SIMPLE: LEFT POSTERIOR THIGH
LOCATION SIMPLE: NECK
LOCATION SIMPLE: SCALP
LOCATION SIMPLE: NECK
LOCATION SIMPLE: RIGHT UPPER ARM
LOCATION SIMPLE: RIGHT POSTERIOR THIGH
LOCATION SIMPLE: LEFT FOREARM
LOCATION SIMPLE: RIGHT FOOT

## 2024-08-15 ASSESSMENT — LOCATION ZONE DERM
LOCATION ZONE: NOSE
LOCATION ZONE: NECK
LOCATION ZONE: ANUS
LOCATION ZONE: FACE
LOCATION ZONE: SCALP
LOCATION ZONE: LEG
LOCATION ZONE: TOE
LOCATION ZONE: TRUNK
LOCATION ZONE: NECK
LOCATION ZONE: ARM
LOCATION ZONE: FEET

## 2024-08-15 NOTE — PROCEDURE: REASSURANCE
Hide Additional Notes?: No
Detail Level: Zone
Detail Level: Detailed
Additional Notes (Optional): 2cm x1.5cm

## 2024-08-15 NOTE — PROCEDURE: ADDITIONAL NOTES
Additional Notes: Since changed consider referral to general surgeon
Render Risk Assessment In Note?: yes
Detail Level: Simple

## 2024-08-15 NOTE — PROCEDURE: BENIGN DESTRUCTION COSMETIC
Anesthesia Type: 0.5% lidocaine with 1:200,000 epinephrine and a 1:5 solution of 8.4% sodium bicarbonate
Price (Use Numbers Only, No Special Characters Or $): 0
Post-Care Instructions: I reviewed with the patient in detail post-care instructions. Patient is to wear sunprotection, and avoid picking at any of the treated lesions. Pt may apply Vaseline to crusted or scabbing areas.
Consent: The patient's consent was obtained including but not limited to risks of crusting, scabbing, blistering, scarring, darker or lighter pigmentary change, recurrence, incomplete removal and infection.
Detail Level: Detailed

## 2025-03-31 ENCOUNTER — APPOINTMENT (OUTPATIENT)
Dept: URBAN - METROPOLITAN AREA CLINIC 176 | Facility: CLINIC | Age: 70
Setting detail: DERMATOLOGY
End: 2025-03-31

## 2025-03-31 VITALS — WEIGHT: 150 LBS | HEIGHT: 62 IN

## 2025-03-31 DIAGNOSIS — D18.0 HEMANGIOMA: ICD-10-CM

## 2025-03-31 DIAGNOSIS — L70.0 ACNE VULGARIS: ICD-10-CM | Status: IMPROVED

## 2025-03-31 DIAGNOSIS — Z85.828 PERSONAL HISTORY OF OTHER MALIGNANT NEOPLASM OF SKIN: ICD-10-CM

## 2025-03-31 DIAGNOSIS — D22 MELANOCYTIC NEVI: ICD-10-CM | Status: UNCHANGED

## 2025-03-31 DIAGNOSIS — L57.8 OTHER SKIN CHANGES DUE TO CHRONIC EXPOSURE TO NONIONIZING RADIATION: ICD-10-CM

## 2025-03-31 DIAGNOSIS — L71.8 OTHER ROSACEA: ICD-10-CM | Status: STABLE

## 2025-03-31 DIAGNOSIS — Z71.89 OTHER SPECIFIED COUNSELING: ICD-10-CM

## 2025-03-31 DIAGNOSIS — L82.1 OTHER SEBORRHEIC KERATOSIS: ICD-10-CM

## 2025-03-31 DIAGNOSIS — L57.0 ACTINIC KERATOSIS: ICD-10-CM

## 2025-03-31 DIAGNOSIS — L81.4 OTHER MELANIN HYPERPIGMENTATION: ICD-10-CM

## 2025-03-31 DIAGNOSIS — L82.0 INFLAMED SEBORRHEIC KERATOSIS: ICD-10-CM | Status: INADEQUATELY CONTROLLED

## 2025-03-31 PROBLEM — D23.72 OTHER BENIGN NEOPLASM OF SKIN OF LEFT LOWER LIMB, INCLUDING HIP: Status: ACTIVE | Noted: 2025-03-31

## 2025-03-31 PROBLEM — D22.5 MELANOCYTIC NEVI OF TRUNK: Status: ACTIVE | Noted: 2025-03-31

## 2025-03-31 PROBLEM — D22.71 MELANOCYTIC NEVI OF RIGHT LOWER LIMB, INCLUDING HIP: Status: ACTIVE | Noted: 2025-03-31

## 2025-03-31 PROBLEM — D18.01 HEMANGIOMA OF SKIN AND SUBCUTANEOUS TISSUE: Status: ACTIVE | Noted: 2025-03-31

## 2025-03-31 PROBLEM — D22.72 MELANOCYTIC NEVI OF LEFT LOWER LIMB, INCLUDING HIP: Status: ACTIVE | Noted: 2025-03-31

## 2025-03-31 PROBLEM — D22.61 MELANOCYTIC NEVI OF RIGHT UPPER LIMB, INCLUDING SHOULDER: Status: ACTIVE | Noted: 2025-03-31

## 2025-03-31 PROCEDURE — 17110 DESTRUCTION B9 LES UP TO 14: CPT

## 2025-03-31 PROCEDURE — ? COUNSELING

## 2025-03-31 PROCEDURE — 99214 OFFICE O/P EST MOD 30 MIN: CPT | Mod: 25

## 2025-03-31 PROCEDURE — ? SUNSCREEN RECOMMENDATIONS

## 2025-03-31 PROCEDURE — ? ADDITIONAL NOTES

## 2025-03-31 PROCEDURE — ? PHOTO-DOCUMENTATION

## 2025-03-31 PROCEDURE — ? PRESCRIPTION MEDICATION MANAGEMENT

## 2025-03-31 PROCEDURE — 17003 DESTRUCT PREMALG LES 2-14: CPT | Mod: 59

## 2025-03-31 PROCEDURE — ? BENIGN DESTRUCTION COSMETIC

## 2025-03-31 PROCEDURE — 17000 DESTRUCT PREMALG LESION: CPT | Mod: 59

## 2025-03-31 PROCEDURE — ? LIQUID NITROGEN

## 2025-03-31 PROCEDURE — ? BENIGN DESTRUCTION

## 2025-03-31 ASSESSMENT — LOCATION SIMPLE DESCRIPTION DERM
LOCATION SIMPLE: RIGHT ANKLE
LOCATION SIMPLE: GLUTEAL CLEFT
LOCATION SIMPLE: RIGHT SHOULDER
LOCATION SIMPLE: LEFT POSTERIOR THIGH
LOCATION SIMPLE: RIGHT FOOT
LOCATION SIMPLE: LEFT GREAT TOE
LOCATION SIMPLE: LOWER BACK
LOCATION SIMPLE: RIGHT UPPER BACK
LOCATION SIMPLE: LEFT FOREHEAD
LOCATION SIMPLE: RIGHT CHEEK
LOCATION SIMPLE: UPPER BACK
LOCATION SIMPLE: RIGHT UPPER ARM
LOCATION SIMPLE: LEFT CHEEK
LOCATION SIMPLE: RIGHT POSTERIOR THIGH
LOCATION SIMPLE: RIGHT FOREARM
LOCATION SIMPLE: ABDOMEN
LOCATION SIMPLE: PERIANAL SKIN
LOCATION SIMPLE: LEFT EYEBROW
LOCATION SIMPLE: RIGHT TEMPLE

## 2025-03-31 ASSESSMENT — LOCATION DETAILED DESCRIPTION DERM
LOCATION DETAILED: LEFT MEDIAL GREAT TOE
LOCATION DETAILED: LEFT INFERIOR MEDIAL MALAR CHEEK
LOCATION DETAILED: PERIANAL SKIN
LOCATION DETAILED: RIGHT INFERIOR TEMPLE
LOCATION DETAILED: LEFT INFERIOR CENTRAL MALAR CHEEK
LOCATION DETAILED: SUPERIOR LUMBAR SPINE
LOCATION DETAILED: RIGHT MEDIAL HEEL
LOCATION DETAILED: RIGHT RIB CAGE
LOCATION DETAILED: LEFT DORSAL GREAT TOE
LOCATION DETAILED: RIGHT SUPERIOR UPPER BACK
LOCATION DETAILED: RIGHT INFERIOR MEDIAL MALAR CHEEK
LOCATION DETAILED: RIGHT PROXIMAL DORSAL FOREARM
LOCATION DETAILED: RIGHT POSTERIOR SHOULDER
LOCATION DETAILED: LEFT PROXIMAL POSTERIOR THIGH
LOCATION DETAILED: LEFT LATERAL EYEBROW
LOCATION DETAILED: LEFT MEDIAL FOREHEAD
LOCATION DETAILED: RIGHT ANTERIOR MEDIAL MALLEOLUS
LOCATION DETAILED: SUPERIOR THORACIC SPINE
LOCATION DETAILED: RIGHT ANTERIOR PROXIMAL UPPER ARM
LOCATION DETAILED: GLUTEAL CLEFT
LOCATION DETAILED: RIGHT SUPERIOR MEDIAL UPPER BACK
LOCATION DETAILED: INFERIOR THORACIC SPINE
LOCATION DETAILED: RIGHT DISTAL POSTERIOR THIGH

## 2025-03-31 ASSESSMENT — LOCATION ZONE DERM
LOCATION ZONE: TOE
LOCATION ZONE: ARM
LOCATION ZONE: TRUNK
LOCATION ZONE: FACE
LOCATION ZONE: FEET
LOCATION ZONE: ANUS
LOCATION ZONE: LEG

## 2025-03-31 ASSESSMENT — PAIN INTENSITY VAS: HOW INTENSE IS YOUR PAIN 0 BEING NO PAIN, 10 BEING THE MOST SEVERE PAIN POSSIBLE?: NO PAIN

## 2025-03-31 NOTE — PROCEDURE: PHOTO-DOCUMENTATION
Detail Level: Detailed
Details (Free Text): Prior photo reviewed today.
Details (Free Text): Prior photos reviewed today.

## 2025-03-31 NOTE — PROCEDURE: LIQUID NITROGEN
Show Applicator Variable?: Yes
Render Post-Care Instructions In Note?: no
Detail Level: Detailed
Aperture Size (Optional): C
Post-Care Instructions: I reviewed with the patient in detail post-care instructions. Patient is to wear sunprotection, and avoid picking at any of the treated lesions. Pt may apply Vaseline to crusted or scabbing areas.
Number Of Freeze-Thaw Cycles: 1 freeze-thaw cycle
Duration Of Freeze Thaw-Cycle (Seconds): 10
Consent: The patient's consent was obtained including but not limited to risks of crusting, scabbing, blistering, scarring, darker or lighter pigmentary change, recurrence, incomplete removal and infection.

## 2025-03-31 NOTE — PROCEDURE: BENIGN DESTRUCTION
Post-Care Instructions: I reviewed with the patient in detail post-care instructions. Patient is to wear sunprotection, and avoid picking at any of the treated lesions. Pt may apply Vaseline to crusted or scabbing areas.
Render Post-Care Instructions In Note?: yes
Treatment Number (Will Not Render If 0): 0
Medical Necessity Clause: This procedure was medically necessary because the lesions that were treated were:
Detail Level: Detailed
Anesthesia Volume In Cc: 1
Consent: The patient's consent was obtained including but not limited to risks of crusting, scabbing, blistering, scarring, darker or lighter pigmentary change, recurrence, incomplete removal and infection. Please return to clinic if lesions do not resolve after 4-6 weeks .
Duration Of Freeze Thaw-Cycle (Seconds): 5-10
Number Of Freeze-Thaw Cycles: 1 freeze-thaw cycle
Render Note In Bullet Format When Appropriate: No
Medical Necessity Information: It is in your best interest to select a reason for this procedure from the list below. All of these items fulfill various CMS LCD requirements except the new and changing color options.

## 2025-03-31 NOTE — PROCEDURE: COUNSELING
Detail Level: Generalized
Detail Level: Detailed
Detail Level: Simple
Spironolactone Counseling: Patient advised regarding risks of diarrhea, abdominal pain, hyperkalemia, birth defects (for female patients), liver toxicity and renal toxicity. The patient may need blood work to monitor liver and kidney function and potassium levels while on therapy. The patient verbalized understanding of the proper use and possible adverse effects of spironolactone.  All of the patient's questions and concerns were addressed.
Dapsone Counseling: I discussed with the patient the risks of dapsone including but not limited to hemolytic anemia, agranulocytosis, rashes, methemoglobinemia, kidney failure, peripheral neuropathy, headaches, GI upset, and liver toxicity.  Patients who start dapsone require monitoring including baseline LFTs and weekly CBCs for the first month, then every month thereafter.  The patient verbalized understanding of the proper use and possible adverse effects of dapsone.  All of the patient's questions and concerns were addressed.
Isotretinoin Pregnancy And Lactation Text: This medication is Pregnancy Category X and is considered extremely dangerous during pregnancy. It is unknown if it is excreted in breast milk.
Birth Control Pills Counseling: Birth Control Pill Counseling: I discussed with the patient the potential side effects of OCPs including but not limited to increased risk of stroke, heart attack, thrombophlebitis, deep venous thrombosis, hepatic adenomas, breast changes, GI upset, headaches, and depression.  The patient verbalized understanding of the proper use and possible adverse effects of OCPs. All of the patient's questions and concerns were addressed.
Topical Clindamycin Counseling: Patient counseled that this medication may cause skin irritation or allergic reactions.  In the event of skin irritation, the patient was advised to reduce the amount of the drug applied or use it less frequently.   The patient verbalized understanding of the proper use and possible adverse effects of clindamycin.  All of the patient's questions and concerns were addressed.
Azelaic Acid Pregnancy And Lactation Text: This medication is considered safe during pregnancy and breast feeding.
Aklief Pregnancy And Lactation Text: It is unknown if this medication is safe to use during pregnancy.  It is unknown if this medication is excreted in breast milk.  Breastfeeding women should use the topical cream on the smallest area of the skin for the shortest time needed while breastfeeding.  Do not apply to nipple and areola.
Sarecycline Counseling: Patient advised regarding possible photosensitivity and discoloration of the teeth, skin, lips, tongue and gums.  Patient instructed to avoid sunlight, if possible.  When exposed to sunlight, patients should wear protective clothing, sunglasses, and sunscreen.  The patient was instructed to call the office immediately if the following severe adverse effects occur:  hearing changes, easy bruising/bleeding, severe headache, or vision changes.  The patient verbalized understanding of the proper use and possible adverse effects of sarecycline.  All of the patient's questions and concerns were addressed.
Use Enhanced Medication Counseling?: No
Erythromycin Pregnancy And Lactation Text: This medication is Pregnancy Category B and is considered safe during pregnancy. It is also excreted in breast milk.
Winlevi Pregnancy And Lactation Text: This medication is considered safe during pregnancy and breastfeeding.
Tazorac Counseling:  Patient advised that medication is irritating and drying.  Patient may need to apply sparingly and wash off after an hour before eventually leaving it on overnight.  The patient verbalized understanding of the proper use and possible adverse effects of tazorac.  All of the patient's questions and concerns were addressed.
Bactrim Counseling:  I discussed with the patient the risks of sulfa antibiotics including but not limited to GI upset, allergic reaction, drug rash, diarrhea, dizziness, photosensitivity, and yeast infections.  Rarely, more serious reactions can occur including but not limited to aplastic anemia, agranulocytosis, methemoglobinemia, blood dyscrasias, liver or kidney failure, lung infiltrates or desquamative/blistering drug rashes.
Doxycycline Pregnancy And Lactation Text: This medication is Pregnancy Category D and not consider safe during pregnancy. It is also excreted in breast milk but is considered safe for shorter treatment courses.
Minocycline Counseling: Patient advised regarding possible photosensitivity and discoloration of the teeth, skin, lips, tongue and gums.  Patient instructed to avoid sunlight, if possible.  When exposed to sunlight, patients should wear protective clothing, sunglasses, and sunscreen.  The patient was instructed to call the office immediately if the following severe adverse effects occur:  hearing changes, easy bruising/bleeding, severe headache, or vision changes.  The patient verbalized understanding of the proper use and possible adverse effects of minocycline.  All of the patient's questions and concerns were addressed.
Tetracycline Pregnancy And Lactation Text: This medication is Pregnancy Category D and not consider safe during pregnancy. It is also excreted in breast milk.
Topical Retinoid counseling:  Patient advised to apply a pea-sized amount only at bedtime and wait 30 minutes after washing their face before applying.  If too drying, patient may add a non-comedogenic moisturizer. The patient verbalized understanding of the proper use and possible adverse effects of retinoids.  All of the patient's questions and concerns were addressed.
Topical Sulfur Applications Pregnancy And Lactation Text: This medication is Pregnancy Category C and has an unknown safety profile during pregnancy. It is unknown if this topical medication is excreted in breast milk.
High Dose Vitamin A Counseling: Side effects reviewed, pt to contact office should one occur.
Detail Level: Zone
Azithromycin Counseling:  I discussed with the patient the risks of azithromycin including but not limited to GI upset, allergic reaction, drug rash, diarrhea, and yeast infections.
Dapsone Pregnancy And Lactation Text: This medication is Pregnancy Category C and is not considered safe during pregnancy or breast feeding.
Spironolactone Pregnancy And Lactation Text: This medication can cause feminization of the male fetus and should be avoided during pregnancy. The active metabolite is also found in breast milk.
Benzoyl Peroxide Counseling: Patient counseled that medicine may cause skin irritation and bleach clothing.  In the event of skin irritation, the patient was advised to reduce the amount of the drug applied or use it less frequently.   The patient verbalized understanding of the proper use and possible adverse effects of benzoyl peroxide.  All of the patient's questions and concerns were addressed.
Topical Clindamycin Pregnancy And Lactation Text: This medication is Pregnancy Category B and is considered safe during pregnancy. It is unknown if it is excreted in breast milk.
Birth Control Pills Pregnancy And Lactation Text: This medication should be avoided if pregnant and for the first 30 days post-partum.
Isotretinoin Counseling: Patient should get monthly blood tests, not donate blood, not drive at night if vision affected, not share medication, and not undergo elective surgery for 6 months after tx completed. Side effects reviewed, pt to contact office should one occur.
Azelaic Acid Counseling: Patient counseled that medicine may cause skin irritation and to avoid applying near the eyes.  In the event of skin irritation, the patient was advised to reduce the amount of the drug applied or use it less frequently.   The patient verbalized understanding of the proper use and possible adverse effects of azelaic acid.  All of the patient's questions and concerns were addressed.
Bactrim Pregnancy And Lactation Text: This medication is Pregnancy Category D and is known to cause fetal risk.  It is also excreted in breast milk.
Tazorac Pregnancy And Lactation Text: This medication is not safe during pregnancy. It is unknown if this medication is excreted in breast milk.
Aklief counseling:  Patient advised to apply a pea-sized amount only at bedtime and wait 30 minutes after washing their face before applying.  If too drying, patient may add a non-comedogenic moisturizer.  The most commonly reported side effects including irritation, redness, scaling, dryness, stinging, burning, itching, and increased risk of sunburn.  The patient verbalized understanding of the proper use and possible adverse effects of retinoids.  All of the patient's questions and concerns were addressed.
Topical Retinoid Pregnancy And Lactation Text: This medication is Pregnancy Category C. It is unknown if this medication is excreted in breast milk.
Erythromycin Counseling:  I discussed with the patient the risks of erythromycin including but not limited to GI upset, allergic reaction, drug rash, diarrhea, increase in liver enzymes, and yeast infections.
Azithromycin Pregnancy And Lactation Text: This medication is considered safe during pregnancy and is also secreted in breast milk.
Doxycycline Counseling:  Patient counseled regarding possible photosensitivity and increased risk for sunburn.  Patient instructed to avoid sunlight, if possible.  When exposed to sunlight, patients should wear protective clothing, sunglasses, and sunscreen.  The patient was instructed to call the office immediately if the following severe adverse effects occur:  hearing changes, easy bruising/bleeding, severe headache, or vision changes.  The patient verbalized understanding of the proper use and possible adverse effects of doxycycline.  All of the patient's questions and concerns were addressed.
High Dose Vitamin A Pregnancy And Lactation Text: High dose vitamin A therapy is contraindicated during pregnancy and breast feeding.
Winlevi Counseling:  I discussed with the patient the risks of topical clascoterone including but not limited to erythema, scaling, itching, and stinging. Patient voiced their understanding.
Topical Sulfur Applications Counseling: Topical Sulfur Counseling: Patient counseled that this medication may cause skin irritation or allergic reactions.  In the event of skin irritation, the patient was advised to reduce the amount of the drug applied or use it less frequently.   The patient verbalized understanding of the proper use and possible adverse effects of topical sulfur application.  All of the patient's questions and concerns were addressed.
Tetracycline Counseling: Patient counseled regarding possible photosensitivity and increased risk for sunburn.  Patient instructed to avoid sunlight, if possible.  When exposed to sunlight, patients should wear protective clothing, sunglasses, and sunscreen.  The patient was instructed to call the office immediately if the following severe adverse effects occur:  hearing changes, easy bruising/bleeding, severe headache, or vision changes.  The patient verbalized understanding of the proper use and possible adverse effects of tetracycline.  All of the patient's questions and concerns were addressed. Patient understands to avoid pregnancy while on therapy due to potential birth defects.
Benzoyl Peroxide Pregnancy And Lactation Text: This medication is Pregnancy Category C. It is unknown if benzoyl peroxide is excreted in breast milk.

## 2025-03-31 NOTE — PROCEDURE: PRESCRIPTION MEDICATION MANAGEMENT
Continue Regimen: -tretinoin 0.05 % topical cream : Apply qhs to face for acne
Detail Level: Simple
Render In Strict Bullet Format?: No

## 2025-08-20 ENCOUNTER — APPOINTMENT (OUTPATIENT)
Dept: URBAN - METROPOLITAN AREA CLINIC 176 | Facility: CLINIC | Age: 70
Setting detail: DERMATOLOGY
End: 2025-08-20

## 2025-08-20 VITALS — HEIGHT: 62.5 IN | WEIGHT: 148 LBS

## 2025-08-20 DIAGNOSIS — L08.9 LOCAL INFECTION OF THE SKIN AND SUBCUTANEOUS TISSUE, UNSPECIFIED: ICD-10-CM | Status: IMPROVED

## 2025-08-20 DIAGNOSIS — L73.9 FOLLICULAR DISORDER, UNSPECIFIED: ICD-10-CM | Status: INADEQUATELY CONTROLLED

## 2025-08-20 PROCEDURE — ? TREATMENT REGIMEN

## 2025-08-20 PROCEDURE — ? COUNSELING

## 2025-08-20 PROCEDURE — ? PRESCRIPTION

## 2025-08-20 RX ORDER — CLINDAMYCIN PHOSPHATE 10 MG/G
GEL TOPICAL
Qty: 60 | Refills: 5 | Status: ERX | COMMUNITY
Start: 2025-08-20

## 2025-08-20 RX ORDER — MUPIROCIN 20 MG/G
OINTMENT TOPICAL
Qty: 22 | Refills: 0 | Status: ERX | COMMUNITY
Start: 2025-08-20

## 2025-08-20 RX ADMIN — MUPIROCIN: 20 OINTMENT TOPICAL at 00:00

## 2025-08-20 RX ADMIN — CLINDAMYCIN PHOSPHATE: 10 GEL TOPICAL at 00:00

## 2025-08-20 ASSESSMENT — LOCATION DETAILED DESCRIPTION DERM
LOCATION DETAILED: LEFT MEDIAL FRONTAL SCALP
LOCATION DETAILED: RIGHT CENTRAL MALAR CHEEK
LOCATION DETAILED: POSTERIOR MID-PARIETAL SCALP
LOCATION DETAILED: RIGHT INFERIOR LATERAL FOREHEAD
LOCATION DETAILED: RIGHT INFERIOR POSTERIOR NECK
LOCATION DETAILED: RIGHT SUPERIOR POSTERIOR NECK
LOCATION DETAILED: RIGHT DISTAL POSTERIOR UPPER ARM
LOCATION DETAILED: RIGHT ANTERIOR DISTAL THIGH

## 2025-08-20 ASSESSMENT — LOCATION ZONE DERM
LOCATION ZONE: SCALP
LOCATION ZONE: NECK
LOCATION ZONE: FACE
LOCATION ZONE: ARM
LOCATION ZONE: LEG

## 2025-08-20 ASSESSMENT — LOCATION SIMPLE DESCRIPTION DERM
LOCATION SIMPLE: RIGHT THIGH
LOCATION SIMPLE: RIGHT CHEEK
LOCATION SIMPLE: POSTERIOR SCALP
LOCATION SIMPLE: RIGHT FOREHEAD
LOCATION SIMPLE: RIGHT POSTERIOR UPPER ARM
LOCATION SIMPLE: POSTERIOR NECK
LOCATION SIMPLE: LEFT SCALP

## 2025-08-20 ASSESSMENT — PAIN INTENSITY VAS: HOW INTENSE IS YOUR PAIN 0 BEING NO PAIN, 10 BEING THE MOST SEVERE PAIN POSSIBLE?: NO PAIN

## 2025-08-20 ASSESSMENT — SEVERITY ASSESSMENT: SEVERITY: MILD

## (undated) DEVICE — SLEEVE, VASO, THIGH, MED

## (undated) DEVICE — MASK, LARYNGEAL AIRWAY #4

## (undated) DEVICE — BANDAGEESMARK  4X9' BLUE LF - 20/CS"

## (undated) DEVICE — CATHETER IV 20 GA X 1-1/4 ---SURG.& SDS ONLY--- (50EA/BX)

## (undated) DEVICE — SUTURE 4-0 VICRYL PLUS FS-2 - 27 INCH (36/BX)

## (undated) DEVICE — TOWEL STOP TIMEOUT SAFETY FLAG (40EA/CA)

## (undated) DEVICE — SET LEADWIRE 5 LEAD BEDSIDE DISPOSABLE ECG (1SET OF 5/EA)

## (undated) DEVICE — GLOVE BIOGEL INDICATOR SZ 7SURGICAL PF LTX - (50/BX 4BX/CA)

## (undated) DEVICE — ELECTRODE 850 FOAM ADHESIVE - HYDROGEL RADIOTRNSPRNT (50/PK)

## (undated) DEVICE — SUCTION INSTRUMENT YANKAUER BULBOUS TIP W/O VENT (50EA/CA)

## (undated) DEVICE — GLOVE SZ 7.5 BIOGEL PI MICRO - PF LF (50PR/BX)

## (undated) DEVICE — SUTURE 0 VICRYL PLUS UR-6 - 27 INCH (36/BX)

## (undated) DEVICE — PLUMEPEN ULTRA 3/8 IN X 10 FT HOSE (20EA/CA)

## (undated) DEVICE — KIT ROOM DECONTAMINATION

## (undated) DEVICE — LACTATED RINGERS INJ 1000 ML - (14EA/CA 60CA/PF)

## (undated) DEVICE — SENSOR SPO2 NEO LNCS ADHESIVE (20/BX) SEE USER NOTES

## (undated) DEVICE — MASK ANESTHESIA ADULT  - (100/CA)

## (undated) DEVICE — CANISTER SUCTION 3000ML MECHANICAL FILTER AUTO SHUTOFF MEDI-VAC NONSTERILE LF DISP  (40EA/CA)

## (undated) DEVICE — MANIFOLD NEPTUNE 1 PORT (20/PK)

## (undated) DEVICE — WRAP SELF ADHERING 3 IN X 5YDS NON STERILE TAN (1/EA)

## (undated) DEVICE — LEGGING LITHOTOMY 31 X 48 IN - (2EA/PK 20PK/CA)

## (undated) DEVICE — SUTURE GENERAL

## (undated) DEVICE — SUTURE 4-0 ETHILON FS-2 18 (36PK/BX)"

## (undated) DEVICE — DRESSING ABDOMINAL PAD STERILE 8 X 10" (360EA/CA)"

## (undated) DEVICE — GLOVE BIOGEL SZ 7 SURGICAL PF LTX - (50PR/BX 4BX/CA)

## (undated) DEVICE — TUBE CONNECTING SUCTION - CLEAR PLASTIC STERILE 72 IN (50EA/CA)

## (undated) DEVICE — SYRINGE NON SAFETY 3 CC 21 GA X 1 1/2 IN (100/BX 8BX/CA)

## (undated) DEVICE — SYRINGE 10 ML CONTROL LL (25EA/BX 4BX/CA)

## (undated) DEVICE — PROTECTOR ULNA NERVE - (36PR/CA)

## (undated) DEVICE — JELLY, KY 2 0Z STERILE

## (undated) DEVICE — GOWN WARMING STANDARD FLEX - (30/CA)

## (undated) DEVICE — WATER IRRIGATION STERILE 1000ML (12EA/CA)

## (undated) DEVICE — TUBING CLEARLINK DUO-VENT - C-FLO (48EA/CA)

## (undated) DEVICE — PACK LOWER EXTREMITY - (2/CA)

## (undated) DEVICE — CANISTER SUCTION RIGID RED 1500CC (40EA/CA)

## (undated) DEVICE — CHLORAPREP 26 ML APPLICATOR - ORANGE TINT(25/CA)

## (undated) DEVICE — SODIUM CHL IRRIGATION 0.9% 1000ML (12EA/CA)

## (undated) DEVICE — LEAD SET 6 DISP. EKG NIHON KOHDEN (100EA/CA)

## (undated) DEVICE — DRAPE UNDER BUTTOCKS FLUID - (20/CA)

## (undated) DEVICE — HEAD HOLDER JUNIOR/ADULT

## (undated) DEVICE — DRAPE STRLE REG TOWEL 18X24 - (10/BX 4BX/CA)"

## (undated) DEVICE — KIT ANESTHESIA W/CIRCUIT & 3/LT BAG W/FILTER (20EA/CA)

## (undated) DEVICE — ELECTRODE DUAL RETURN W/ CORD - (50/PK)

## (undated) DEVICE — BANDAGE STERILE 3 IN X 75 IN (12EA/BX 8BX/CA)

## (undated) DEVICE — KIT  I.V. START (100EA/CA)

## (undated) DEVICE — HEMOSTAT SURG ABSORBABLE - 4 X 8 IN SURGICEL (24EA/CA)

## (undated) DEVICE — BRIEF STRETCH MATERNITY M/L - FITS 20-60IN (5EA/BG 20BG/CA)

## (undated) DEVICE — SET EXTENSION WITH 2 PORTS (48EA/CA) ***PART #2C8610 IS A SUBSTITUTE*****

## (undated) DEVICE — PACK MINOR BASIN - (2EA/CA)

## (undated) DEVICE — NEPTUNE 4 PORT MANIFOLD - (20/PK)